# Patient Record
Sex: MALE | Race: BLACK OR AFRICAN AMERICAN | Employment: FULL TIME | URBAN - NONMETROPOLITAN AREA
[De-identification: names, ages, dates, MRNs, and addresses within clinical notes are randomized per-mention and may not be internally consistent; named-entity substitution may affect disease eponyms.]

---

## 2017-06-13 ENCOUNTER — OFFICE VISIT (OUTPATIENT)
Dept: UROLOGY | Age: 57
End: 2017-06-13

## 2017-06-13 VITALS
BODY MASS INDEX: 35.16 KG/M2 | DIASTOLIC BLOOD PRESSURE: 82 MMHG | SYSTOLIC BLOOD PRESSURE: 132 MMHG | HEIGHT: 74 IN | WEIGHT: 274 LBS

## 2017-06-13 DIAGNOSIS — R97.20 ELEVATED PSA: Primary | ICD-10-CM

## 2017-06-13 LAB
BILIRUBIN URINE: NEGATIVE
BLOOD URINE, POC: NORMAL
CHARACTER, URINE: CLEAR
COLOR, URINE: YELLOW
GLUCOSE URINE: NEGATIVE MG/DL
KETONES, URINE: NEGATIVE
LEUKOCYTE CLUMPS, URINE: NEGATIVE
NITRITE, URINE: NEGATIVE
PH, URINE: 5.5
PROTEIN, URINE: NEGATIVE MG/DL
SPECIFIC GRAVITY, URINE: 1.02 (ref 1–1.03)
UROBILINOGEN, URINE: 0.2 EU/DL

## 2017-06-13 PROCEDURE — 99213 OFFICE O/P EST LOW 20 MIN: CPT | Performed by: NURSE PRACTITIONER

## 2017-06-13 PROCEDURE — 81003 URINALYSIS AUTO W/O SCOPE: CPT | Performed by: NURSE PRACTITIONER

## 2017-06-13 ASSESSMENT — ENCOUNTER SYMPTOMS
VOMITING: 0
ABDOMINAL PAIN: 0
NAUSEA: 0

## 2017-06-19 RX ORDER — AMLODIPINE BESYLATE 5 MG/1
5 TABLET ORAL DAILY
Qty: 90 TABLET | Refills: 0 | Status: SHIPPED | OUTPATIENT
Start: 2017-06-19 | End: 2017-09-17 | Stop reason: SDUPTHER

## 2017-07-24 DIAGNOSIS — E78.5 HYPERLIPIDEMIA WITH TARGET LDL LESS THAN 100: Primary | ICD-10-CM

## 2017-07-24 DIAGNOSIS — R73.01 IFG (IMPAIRED FASTING GLUCOSE): ICD-10-CM

## 2017-07-26 ENCOUNTER — HOSPITAL ENCOUNTER (OUTPATIENT)
Age: 57
Discharge: HOME | End: 2017-07-26
Payer: COMMERCIAL

## 2017-07-26 DIAGNOSIS — E78.5 HYPERLIPIDEMIA WITH TARGET LDL LESS THAN 100: ICD-10-CM

## 2017-07-26 DIAGNOSIS — R73.01 IFG (IMPAIRED FASTING GLUCOSE): ICD-10-CM

## 2017-07-26 LAB
ALBUMIN SERPL-MCNC: 4.1 G/DL (ref 3.5–5.1)
ALP BLD-CCNC: 73 U/L (ref 38–126)
ALT SERPL-CCNC: 25 U/L (ref 11–66)
ANION GAP SERPL CALCULATED.3IONS-SCNC: 14 MEQ/L (ref 8–16)
AST SERPL-CCNC: 27 U/L (ref 5–40)
AVERAGE GLUCOSE: 114 MG/DL (ref 70–126)
BILIRUB SERPL-MCNC: 0.7 MG/DL (ref 0.3–1.2)
BUN BLDV-MCNC: 14 MG/DL (ref 7–22)
CALCIUM SERPL-MCNC: 9 MG/DL (ref 8.5–10.5)
CHLORIDE BLD-SCNC: 103 MEQ/L (ref 98–111)
CHOLESTEROL, TOTAL: 130 MG/DL (ref 100–199)
CO2: 23 MEQ/L (ref 23–33)
CREAT SERPL-MCNC: 1 MG/DL (ref 0.4–1.2)
GLUCOSE BLD-MCNC: 95 MG/DL (ref 70–108)
HBA1C MFR BLD: 5.8 % (ref 4.4–6.4)
HDLC SERPL-MCNC: 63 MG/DL
LDL CHOLESTEROL CALCULATED: 55 MG/DL
POTASSIUM SERPL-SCNC: 4 MEQ/L (ref 3.5–5.2)
SODIUM BLD-SCNC: 140 MEQ/L (ref 135–145)
T4 FREE: 1.22 NG/DL (ref 0.93–1.76)
TOTAL PROTEIN: 6.7 G/DL (ref 6.1–8)
TRIGL SERPL-MCNC: 59 MG/DL (ref 0–199)
TSH SERPL DL<=0.05 MIU/L-ACNC: 0.28 UIU/ML (ref 0.4–4.2)

## 2017-07-26 PROCEDURE — 83036 HEMOGLOBIN GLYCOSYLATED A1C: CPT

## 2017-07-26 PROCEDURE — 84439 ASSAY OF FREE THYROXINE: CPT

## 2017-07-26 PROCEDURE — 80061 LIPID PANEL: CPT

## 2017-07-26 PROCEDURE — 84443 ASSAY THYROID STIM HORMONE: CPT

## 2017-07-26 PROCEDURE — 80053 COMPREHEN METABOLIC PANEL: CPT

## 2017-07-26 PROCEDURE — 36415 COLL VENOUS BLD VENIPUNCTURE: CPT

## 2017-08-03 ENCOUNTER — OFFICE VISIT (OUTPATIENT)
Dept: FAMILY MEDICINE CLINIC | Age: 57
End: 2017-08-03
Payer: COMMERCIAL

## 2017-08-03 VITALS
RESPIRATION RATE: 14 BRPM | HEART RATE: 76 BPM | HEIGHT: 74 IN | OXYGEN SATURATION: 98 % | SYSTOLIC BLOOD PRESSURE: 112 MMHG | TEMPERATURE: 97.7 F | DIASTOLIC BLOOD PRESSURE: 78 MMHG | WEIGHT: 258 LBS | BODY MASS INDEX: 33.11 KG/M2

## 2017-08-03 DIAGNOSIS — N41.1 CHRONIC PROSTATITIS: ICD-10-CM

## 2017-08-03 DIAGNOSIS — R97.20 ELEVATED PSA: ICD-10-CM

## 2017-08-03 DIAGNOSIS — Z00.00 WELL ADULT HEALTH CHECK: Primary | ICD-10-CM

## 2017-08-03 DIAGNOSIS — E78.5 HYPERLIPIDEMIA WITH TARGET LDL LESS THAN 100: ICD-10-CM

## 2017-08-03 DIAGNOSIS — I10 ESSENTIAL HYPERTENSION: ICD-10-CM

## 2017-08-03 DIAGNOSIS — R73.01 IFG (IMPAIRED FASTING GLUCOSE): ICD-10-CM

## 2017-08-03 DIAGNOSIS — Z12.11 SCREEN FOR COLON CANCER: ICD-10-CM

## 2017-08-03 LAB — GFR SERPL CREATININE-BSD FRML MDRD: 77 ML/MIN/1.73M2

## 2017-08-03 PROCEDURE — 99396 PREV VISIT EST AGE 40-64: CPT | Performed by: FAMILY MEDICINE

## 2017-08-03 ASSESSMENT — PATIENT HEALTH QUESTIONNAIRE - PHQ9
1. LITTLE INTEREST OR PLEASURE IN DOING THINGS: 0
2. FEELING DOWN, DEPRESSED OR HOPELESS: 0
SUM OF ALL RESPONSES TO PHQ QUESTIONS 1-9: 0
SUM OF ALL RESPONSES TO PHQ9 QUESTIONS 1 & 2: 0

## 2017-08-03 ASSESSMENT — ENCOUNTER SYMPTOMS
GASTROINTESTINAL NEGATIVE: 1
RESPIRATORY NEGATIVE: 1

## 2017-08-21 RX ORDER — ATORVASTATIN CALCIUM 20 MG/1
TABLET, FILM COATED ORAL
Qty: 30 TABLET | Refills: 0 | Status: SHIPPED | OUTPATIENT
Start: 2017-08-21 | End: 2017-09-17 | Stop reason: SDUPTHER

## 2017-09-18 RX ORDER — AMLODIPINE BESYLATE 5 MG/1
TABLET ORAL
Qty: 90 TABLET | Refills: 0 | Status: SHIPPED | OUTPATIENT
Start: 2017-09-18 | End: 2017-12-18 | Stop reason: SDUPTHER

## 2017-09-18 RX ORDER — ATORVASTATIN CALCIUM 20 MG/1
TABLET, FILM COATED ORAL
Qty: 30 TABLET | Refills: 0 | Status: SHIPPED | OUTPATIENT
Start: 2017-09-18 | End: 2017-10-21 | Stop reason: SDUPTHER

## 2017-10-23 RX ORDER — ATORVASTATIN CALCIUM 20 MG/1
TABLET, FILM COATED ORAL
Qty: 30 TABLET | Refills: 11 | Status: SHIPPED | OUTPATIENT
Start: 2017-10-23 | End: 2018-10-25 | Stop reason: SDUPTHER

## 2017-12-18 RX ORDER — AMLODIPINE BESYLATE 5 MG/1
TABLET ORAL
Qty: 90 TABLET | Refills: 0 | Status: SHIPPED | OUTPATIENT
Start: 2017-12-18 | End: 2018-03-20 | Stop reason: SDUPTHER

## 2018-01-18 ENCOUNTER — HOSPITAL ENCOUNTER (EMERGENCY)
Age: 58
Discharge: HOME | End: 2018-01-18
Attending: INTERNAL MEDICINE | Admitting: INTERNAL MEDICINE
Payer: COMMERCIAL

## 2018-01-18 VITALS
RESPIRATION RATE: 20 BRPM | TEMPERATURE: 98.2 F | DIASTOLIC BLOOD PRESSURE: 62 MMHG | HEART RATE: 88 BPM | OXYGEN SATURATION: 98 % | SYSTOLIC BLOOD PRESSURE: 117 MMHG

## 2018-01-18 DIAGNOSIS — T18.128A FOOD IMPACTION OF ESOPHAGUS, INITIAL ENCOUNTER: Primary | ICD-10-CM

## 2018-01-18 PROCEDURE — 6370000000 HC RX 637 (ALT 250 FOR IP): Performed by: INTERNAL MEDICINE

## 2018-01-18 PROCEDURE — 6360000002 HC RX W HCPCS: Performed by: INTERNAL MEDICINE

## 2018-01-18 PROCEDURE — 99153 MOD SED SAME PHYS/QHP EA: CPT | Performed by: INTERNAL MEDICINE

## 2018-01-18 PROCEDURE — 99283 EMERGENCY DEPT VISIT LOW MDM: CPT

## 2018-01-18 PROCEDURE — 43215 ESOPHAGOSCOPY FLEX REMOVE FB: CPT

## 2018-01-18 PROCEDURE — 99152 MOD SED SAME PHYS/QHP 5/>YRS: CPT | Performed by: INTERNAL MEDICINE

## 2018-01-18 PROCEDURE — 3609012900 HC EGD FOREIGN BODY REMOVAL: Performed by: INTERNAL MEDICINE

## 2018-01-18 RX ORDER — MIDAZOLAM HYDROCHLORIDE 1 MG/ML
INJECTION INTRAMUSCULAR; INTRAVENOUS PRN
Status: DISCONTINUED | OUTPATIENT
Start: 2018-01-18 | End: 2018-01-18 | Stop reason: HOSPADM

## 2018-01-18 RX ORDER — FENTANYL CITRATE 50 UG/ML
INJECTION, SOLUTION INTRAMUSCULAR; INTRAVENOUS PRN
Status: DISCONTINUED | OUTPATIENT
Start: 2018-01-18 | End: 2018-01-18 | Stop reason: HOSPADM

## 2018-01-18 ASSESSMENT — ENCOUNTER SYMPTOMS
BACK PAIN: 0
ABDOMINAL PAIN: 1
NAUSEA: 0
SORE THROAT: 0
SHORTNESS OF BREATH: 0
VOMITING: 0
COUGH: 0
EYE REDNESS: 0
DIARRHEA: 0
WHEEZING: 0
RHINORRHEA: 0
EYE DISCHARGE: 0

## 2018-01-18 ASSESSMENT — PAIN SCALES - GENERAL: PAINLEVEL_OUTOF10: 0

## 2018-01-18 NOTE — ED NOTES
Bed: 015A  Expected date:   Expected time:   Means of arrival:   Comments:  Stef Allison RN  01/18/18 [id number]

## 2018-01-18 NOTE — ED PROVIDER NOTES
[facility] Emergency Department    CHIEF COMPLAINT       Chief Complaint   Patient presents with    Foreign Body     in throat       Nurses Notes reviewed and I agree except as noted in the HPI. HISTORY OF PRESENT ILLNESS    Sylvain Benitez is a 59 y.o. male who presents to the ED for evaluation of foreign body stuck in his throat. The patient states that he has steak stuck in the epigastric area since 1200. He reports that he tried warm water and it came back up. He also tried to vomit the steak back out but had no success. He says that he doesn't have pain but it \"feels full and uncomfortable\". The patient reports that he has this issue about 1-2 times a year. He reports that his gastroenterologist is Dr. Gisselle Delgado. He has no other physical complaints at this time. Pain description:  Onset: eating steak and got stuck in the epigastric area  Location: epigastric  Duration: continous  Character: \"uncomfortable and feels full\"  Aggravating factors: none  Radiation: none  Timing: today  Severity: moderate    Experienced previously: 1-2 times a year    HPI was provided by the patient. REVIEW OF SYSTEMS     Review of Systems   Constitutional: Negative for appetite change, chills, fatigue and fever. HENT: Negative for congestion, ear pain, rhinorrhea and sore throat. Eyes: Negative for discharge, redness and visual disturbance. Respiratory: Negative for cough, shortness of breath and wheezing. Cardiovascular: Negative for chest pain, palpitations and leg swelling. Gastrointestinal: Positive for abdominal pain (epigastric). Negative for diarrhea, nausea and vomiting. Genitourinary: Negative for decreased urine volume, difficulty urinating and dysuria. Musculoskeletal: Negative for arthralgias, back pain, joint swelling and neck pain. Skin: Negative for pallor and rash. Allergic/Immunologic: Negative for environmental allergies.    Neurological: Negative for dizziness, syncope, weakness, Labs and imaging were not seen necessary. Dr. Bishop Posada his gastroenterologist was consulted and discussed the patient's current condition and history. He kindly agreed to take the patient to endoscopy for definitive care. Food was pushed into stomach, Duane Lunch will follow up on outpatient basis. Patient was reassessed post procedure and symptoms improve. He ambulated with no difficulty. Son was here to take patient home. The results and plan for discharge have been discussed and the patient is amenable. The patient is stable for discharge. Medications - No data to display    Patient was seen independently by myself. The patient's final impression and disposition and plan was determined by myself. CRITICAL CARE:   None    CONSULTS:  Dr. Bishop Posada, gastroenterology - consulted and discussed the patient's current condition and history. PROCEDURES:  None    FINAL IMPRESSION      1. Food impaction of esophagus, initial encounter          DISPOSITION/PLAN   discharge    PATIENT REFERRED TO:  Bishop Swetha MD  [address] [facility]. [address]  [phone]    Call   For follow up and evaluation      DISCHARGE MEDICATIONS:  Current Discharge Medication List          (Please note that portions of this note were completed with a voice recognition program.  Efforts were made to edit the dictations but occasionally words are mis-transcribed.)    Scribe:  Yossi Oakes 1/18/18 3:31 PM Scribing for and in the presence of Bandar De La Torre CNP. Signed by: Rosetta Edwards, 01/18/18 5:35 PM    Provider:  I personally performed the services described in the documentation, reviewed and edited the documentation which was dictated to the scribe in my presence, and it accurately records my words and actions.     Bandar De La Torre CNP 01/18/18 5:35 PM    [address] NP  01/18/18 [id number]

## 2018-01-18 NOTE — PRE SEDATION
[facility]  Sedation/Analgesia History & Physical    Patient: Thi Call: 1960  Med Rec#: [id number] Acc#: [id number]   Provider Performing Procedure: Anthony Manley  Primary Care Physician: Deon Ray DO    PRE-PROCEDURE   Full CODE [x]Yes  DNR-CCA/DNR-CC []Yes   Brief History/Pre-Procedure Diagnosis:Esophageal food impaction           MEDICAL HISTORY  []CAD/Valve  []Liver Disease  []Lung Disease []Diabetes  []Hypertension []Renal Disease  []Additional information:       has a past medical history of Elevated PSA; Hyperlipidemia LDL goal < 100; and Hypertension. 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 Additional information:       ALLERGIES   Allergies as of 01/18/2018 - Review Complete 01/18/2018   Allergen Reaction Noted    Lisinopril Swelling 08/28/2017    Shellfish-derived products  07/08/2013     Additional information:       MEDICATIONS   Coumadin Use Last 7 Days [x]No []Yes  Antiplatelet drug therapy use last 7 days  [x]No []Yes  Other anticoagulant use last 7 days  [x]No []Yes    Current Facility-Administered Medications:     benzocaine (HURRICAINE) 20 % oral spray, , , PRN, Anthony Manley MD, 1 each at 01/18/18 1625    fentaNYL (SUBLIMAZE) injection, , , PRN, Anthony Manley MD, 25 mcg at 01/18/18 1628    midazolam (VERSED) injection, , , PRN, Anthony Manley MD, 1 mg at 01/18/18 1627  Prior to Admission medications    Medication Sig Start Date End Date Taking?  Authorizing Provider   amLODIPine (NORVASC) 5 MG tablet TAKE 1 TABLET BY MOUTH ONE TIME A DAY  12/18/17   Deon Ray DO   atorvastatin (LIPITOR) 20 MG tablet TAKE 1 TABLET BY MOUTH ONE TIME A DAY  10/23/17   Deon Ray DO   SUPREP BOWEL PREP KIT 17.5-3.13-1.6 GM/180ML SOLN Take 1 kit by mouth See Admin Instructions Use as directed 8/28/17   Anthony Manley MD

## 2018-01-19 NOTE — CONSULTS
[address] [place] [address]                                   CONSULTATION    PATIENT NAME: Alyson Saenz                    :        1960  MED REC NO:   [id number]                           ROOM:       015  ACCOUNT NO:   [de-identified]                           ADMIT DATE: 2018  PROVIDER:     Chriss Hammans, M.D. Steffi Roys DATE:  2018    HISTORY OF PRESENT ILLNESS:  The patient is known to the practice, present  to the ER with food impaction of the esophagus. It was in the afternoon,  he is not able to swallow , even saliva. He has history of gastric reflux. Had dysphagia for years. He history of difficulty swallowing in the past, but  not severe, happening every few months. At this time, he is not able to  swallow. He was eating steak. He has no regurgitation in the past. No  Has odynophagia _. No vomit of undigested food. He lost weight, but at this  time, his bowel movement has not changed. He has no blood in the stool. No mucus discharge in the stool. He is has no diarrhea at this time. PAST MEDICAL HISTORY:  Significant for hypertension, hyperlipidemia,  elevated PSA, Achilles tendon surgery. PAST SURGICAL HISTORY:  Cornea transplant. FAMILY HISTORY:  Hypertension and colon cancer. SOCIAL HISTORY:  No smoking. No alcohol abuse. CURRENT MEDICATIONS:  He is on Lipitor, Norvasc, prednisone, multivitamin,  and omeprazole. ALLERGIES:  LISINOPRIL and SHELLFISH DERIVED PRODUCTS. PHYSICAL EXAMINATION:  GENERAL:  Pleasant, alert, and comfortable male. Not using accessory  muscle. VITAL SIGNS:  He is afebrile. His blood pressure 144/100. Respirations 18. HEENT:  Head is atraumatic. Sclerae anicteric. Oral cavity normal.  CHEST:  Normal.  CARDIOVASCULAR:  Normal.  ABDOMEN:  Soft and nontender. There is no rebound or guarding. No  hepatomegaly. No splenomegaly.   EXTREMITIES:  No

## 2018-01-19 NOTE — OP NOTE
retroflexion or examination of the cardia  due to the fact that there is food particles seen in the stomach to prevent  examination. No biopsy obtained. Scope withdrawn with no immediate  complication. IMPRESSION:  1. Food impaction in the distal esophagus, removed by pushing to the  gastric lumen. 2.  Tortous esophagus and features suggest acid reflux as well as  eosinophilic esophagitis. PLAN:  1. Continue PPI. 2.  Soft diet. 3.  We will schedule the patient for upper endoscopy elective with dilation  to prevent food impaction of the esophagus. 4.  Watch for GI bleed after the endoscopy.         Bill Wilcox M.D.    D: 01/18/2018 17:33:00       T: 01/19/2018 3:06:51     [id number]  Job#: [id number]     Doc#: [id number]    CC:  Thang Segura D.O.

## 2018-03-21 RX ORDER — AMLODIPINE BESYLATE 5 MG/1
TABLET ORAL
Qty: 90 TABLET | Refills: 0 | Status: SHIPPED | OUTPATIENT
Start: 2018-03-21 | End: 2018-06-21 | Stop reason: SDUPTHER

## 2018-06-07 ENCOUNTER — HOSPITAL ENCOUNTER (OUTPATIENT)
Age: 58
Discharge: HOME | End: 2018-06-07
Payer: COMMERCIAL

## 2018-06-07 DIAGNOSIS — R97.20 ELEVATED PSA: ICD-10-CM

## 2018-06-07 LAB — PROSTATE SPECIFIC ANTIGEN: 6.44 NG/ML (ref 0–1)

## 2018-06-07 PROCEDURE — 84153 ASSAY OF PSA TOTAL: CPT

## 2018-06-07 PROCEDURE — 36415 COLL VENOUS BLD VENIPUNCTURE: CPT

## 2018-06-13 ENCOUNTER — OFFICE VISIT (OUTPATIENT)
Dept: UROLOGY | Age: 58
End: 2018-06-13
Payer: COMMERCIAL

## 2018-06-13 VITALS
BODY MASS INDEX: 29.76 KG/M2 | SYSTOLIC BLOOD PRESSURE: 136 MMHG | DIASTOLIC BLOOD PRESSURE: 88 MMHG | HEIGHT: 74 IN | WEIGHT: 231.9 LBS

## 2018-06-13 DIAGNOSIS — R97.20 ELEVATED PSA: Primary | ICD-10-CM

## 2018-06-13 LAB
BILIRUBIN URINE: NEGATIVE
BLOOD URINE, POC: ABNORMAL
CHARACTER, URINE: CLEAR
COLOR, URINE: YELLOW
GLUCOSE URINE: NEGATIVE MG/DL
KETONES, URINE: NEGATIVE
LEUKOCYTE CLUMPS, URINE: NEGATIVE
NITRITE, URINE: NEGATIVE
PH, URINE: 6
PROTEIN, URINE: NEGATIVE MG/DL
SPECIFIC GRAVITY, URINE: 1.02 (ref 1–1.03)
UROBILINOGEN, URINE: 0.2 EU/DL

## 2018-06-13 PROCEDURE — 99213 OFFICE O/P EST LOW 20 MIN: CPT | Performed by: NURSE PRACTITIONER

## 2018-06-13 PROCEDURE — 1036F TOBACCO NON-USER: CPT | Performed by: NURSE PRACTITIONER

## 2018-06-13 PROCEDURE — 3017F COLORECTAL CA SCREEN DOC REV: CPT | Performed by: NURSE PRACTITIONER

## 2018-06-13 PROCEDURE — G8417 CALC BMI ABV UP PARAM F/U: HCPCS | Performed by: NURSE PRACTITIONER

## 2018-06-13 PROCEDURE — 81003 URINALYSIS AUTO W/O SCOPE: CPT | Performed by: NURSE PRACTITIONER

## 2018-06-13 PROCEDURE — G8427 DOCREV CUR MEDS BY ELIG CLIN: HCPCS | Performed by: NURSE PRACTITIONER

## 2018-06-14 ASSESSMENT — ENCOUNTER SYMPTOMS
VOMITING: 0
ABDOMINAL PAIN: 0
NAUSEA: 0

## 2018-06-21 RX ORDER — AMLODIPINE BESYLATE 5 MG/1
TABLET ORAL
Qty: 90 TABLET | Refills: 0 | Status: SHIPPED | OUTPATIENT
Start: 2018-06-21 | End: 2018-09-21 | Stop reason: SDUPTHER

## 2018-07-15 ENCOUNTER — PATIENT MESSAGE (OUTPATIENT)
Dept: FAMILY MEDICINE CLINIC | Age: 58
End: 2018-07-15

## 2018-07-15 DIAGNOSIS — Z00.00 WELL ADULT HEALTH CHECK: Primary | ICD-10-CM

## 2018-07-30 ENCOUNTER — HOSPITAL ENCOUNTER (OUTPATIENT)
Age: 58
Discharge: HOME | End: 2018-07-30
Payer: COMMERCIAL

## 2018-07-30 DIAGNOSIS — Z00.00 WELL ADULT HEALTH CHECK: ICD-10-CM

## 2018-07-30 LAB
ALBUMIN SERPL-MCNC: 4.1 G/DL (ref 3.5–5.1)
ALP BLD-CCNC: 82 U/L (ref 38–126)
ALT SERPL-CCNC: 27 U/L (ref 11–66)
ANION GAP SERPL CALCULATED.3IONS-SCNC: 15 MEQ/L (ref 8–16)
AST SERPL-CCNC: 26 U/L (ref 5–40)
AVERAGE GLUCOSE: 111 MG/DL (ref 70–126)
BASOPHILS # BLD: 0.6 %
BASOPHILS ABSOLUTE: 0 THOU/MM3 (ref 0–0.1)
BILIRUB SERPL-MCNC: 0.5 MG/DL (ref 0.3–1.2)
BUN BLDV-MCNC: 18 MG/DL (ref 7–22)
CALCIUM SERPL-MCNC: 9.5 MG/DL (ref 8.5–10.5)
CHLORIDE BLD-SCNC: 107 MEQ/L (ref 98–111)
CHOLESTEROL, TOTAL: 138 MG/DL (ref 100–199)
CO2: 22 MEQ/L (ref 23–33)
CREAT SERPL-MCNC: 1.2 MG/DL (ref 0.4–1.2)
EOSINOPHIL # BLD: 3.6 %
EOSINOPHILS ABSOLUTE: 0.2 THOU/MM3 (ref 0–0.4)
ERYTHROCYTE [DISTWIDTH] IN BLOOD BY AUTOMATED COUNT: 13.8 % (ref 11.5–14.5)
ERYTHROCYTE [DISTWIDTH] IN BLOOD BY AUTOMATED COUNT: 47.8 FL (ref 35–45)
GFR SERPL CREATININE-BSD FRML MDRD: 75 ML/MIN/1.73M2
GLUCOSE BLD-MCNC: 98 MG/DL (ref 70–108)
HBA1C MFR BLD: 5.7 % (ref 4.4–6.4)
HCT VFR BLD CALC: 46.4 % (ref 42–52)
HDLC SERPL-MCNC: 60 MG/DL
HEMOGLOBIN: 15.2 GM/DL (ref 14–18)
IMMATURE GRANS (ABS): 0.01 THOU/MM3 (ref 0–0.07)
IMMATURE GRANULOCYTES: 0.2 %
LDL CHOLESTEROL CALCULATED: 61 MG/DL
LYMPHOCYTES # BLD: 26 %
LYMPHOCYTES ABSOLUTE: 1.7 THOU/MM3 (ref 1–4.8)
MCH RBC QN AUTO: 31 PG (ref 26–33)
MCHC RBC AUTO-ENTMCNC: 32.8 GM/DL (ref 32.2–35.5)
MCV RBC AUTO: 94.7 FL (ref 80–94)
MONOCYTES # BLD: 7.9 %
MONOCYTES ABSOLUTE: 0.5 THOU/MM3 (ref 0.4–1.3)
NUCLEATED RED BLOOD CELLS: 0 /100 WBC
PLATELET # BLD: 194 THOU/MM3 (ref 130–400)
PMV BLD AUTO: 9.5 FL (ref 9.4–12.4)
POTASSIUM SERPL-SCNC: 4.1 MEQ/L (ref 3.5–5.2)
RBC # BLD: 4.9 MILL/MM3 (ref 4.7–6.1)
SEG NEUTROPHILS: 61.7 %
SEGMENTED NEUTROPHILS ABSOLUTE COUNT: 3.9 THOU/MM3 (ref 1.8–7.7)
SODIUM BLD-SCNC: 144 MEQ/L (ref 135–145)
TOTAL PROTEIN: 7.3 G/DL (ref 6.1–8)
TRIGL SERPL-MCNC: 83 MG/DL (ref 0–199)
TSH SERPL DL<=0.05 MIU/L-ACNC: 0.7 UIU/ML (ref 0.4–4.2)
WBC # BLD: 6.4 THOU/MM3 (ref 4.8–10.8)

## 2018-07-30 PROCEDURE — 84443 ASSAY THYROID STIM HORMONE: CPT

## 2018-07-30 PROCEDURE — 80053 COMPREHEN METABOLIC PANEL: CPT

## 2018-07-30 PROCEDURE — 83036 HEMOGLOBIN GLYCOSYLATED A1C: CPT

## 2018-07-30 PROCEDURE — 85025 COMPLETE CBC W/AUTO DIFF WBC: CPT

## 2018-07-30 PROCEDURE — 80061 LIPID PANEL: CPT

## 2018-07-30 PROCEDURE — 36415 COLL VENOUS BLD VENIPUNCTURE: CPT

## 2018-08-06 ENCOUNTER — OFFICE VISIT (OUTPATIENT)
Dept: FAMILY MEDICINE CLINIC | Age: 58
End: 2018-08-06
Payer: COMMERCIAL

## 2018-08-06 VITALS
BODY MASS INDEX: 33.84 KG/M2 | RESPIRATION RATE: 12 BRPM | DIASTOLIC BLOOD PRESSURE: 72 MMHG | SYSTOLIC BLOOD PRESSURE: 130 MMHG | HEART RATE: 76 BPM | HEIGHT: 74 IN | WEIGHT: 263.7 LBS

## 2018-08-06 DIAGNOSIS — I10 ESSENTIAL HYPERTENSION: Primary | ICD-10-CM

## 2018-08-06 DIAGNOSIS — R97.20 ELEVATED PSA: ICD-10-CM

## 2018-08-06 DIAGNOSIS — N41.1 CHRONIC PROSTATITIS: ICD-10-CM

## 2018-08-06 DIAGNOSIS — E78.5 HYPERLIPIDEMIA WITH TARGET LDL LESS THAN 100: ICD-10-CM

## 2018-08-06 DIAGNOSIS — R73.01 IFG (IMPAIRED FASTING GLUCOSE): ICD-10-CM

## 2018-08-06 PROCEDURE — G8427 DOCREV CUR MEDS BY ELIG CLIN: HCPCS | Performed by: FAMILY MEDICINE

## 2018-08-06 PROCEDURE — 3017F COLORECTAL CA SCREEN DOC REV: CPT | Performed by: FAMILY MEDICINE

## 2018-08-06 PROCEDURE — 99214 OFFICE O/P EST MOD 30 MIN: CPT | Performed by: FAMILY MEDICINE

## 2018-08-06 PROCEDURE — G8417 CALC BMI ABV UP PARAM F/U: HCPCS | Performed by: FAMILY MEDICINE

## 2018-08-06 PROCEDURE — 1036F TOBACCO NON-USER: CPT | Performed by: FAMILY MEDICINE

## 2018-08-06 ASSESSMENT — ENCOUNTER SYMPTOMS
RESPIRATORY NEGATIVE: 1
GASTROINTESTINAL NEGATIVE: 1

## 2018-08-06 ASSESSMENT — PATIENT HEALTH QUESTIONNAIRE - PHQ9
1. LITTLE INTEREST OR PLEASURE IN DOING THINGS: 0
SUM OF ALL RESPONSES TO PHQ9 QUESTIONS 1 & 2: 0
2. FEELING DOWN, DEPRESSED OR HOPELESS: 0
SUM OF ALL RESPONSES TO PHQ QUESTIONS 1-9: 0

## 2018-08-06 NOTE — PATIENT INSTRUCTIONS
more often at first or until your blood pressure comes down. · If you are taking blood pressure medicine, talk to your doctor before you take decongestants or anti-inflammatory medicine, such as ibuprofen. Some of these medicines can raise blood pressure. · Learn how to check your blood pressure at home. Lifestyle changes  · Stay at a healthy weight. This is especially important if you put on weight around the waist. Losing even 10 pounds can help you lower your blood pressure. · If your doctor recommends it, get more exercise. Walking is a good choice. Bit by bit, increase the amount you walk every day. Try for at least 30 minutes on most days of the week. You also may want to swim, bike, or do other activities. · Avoid or limit alcohol. Talk to your doctor about whether you can drink any alcohol. · Try to limit how much sodium you eat to less than 2,300 milligrams (mg) a day. Your doctor may ask you to try to eat less than 1,500 mg a day. · Eat plenty of fruits (such as bananas and oranges), vegetables, legumes, whole grains, and low-fat dairy products. · Lower the amount of saturated fat in your diet. Saturated fat is found in animal products such as milk, cheese, and meat. Limiting these foods may help you lose weight and also lower your risk for heart disease. · Do not smoke. Smoking increases your risk for heart attack and stroke. If you need help quitting, talk to your doctor about stop-smoking programs and medicines. These can increase your chances of quitting for good. When should you call for help? Call 911 anytime you think you may need emergency care. This may mean having symptoms that suggest that your blood pressure is causing a serious heart or blood vessel problem. Your blood pressure may be over 180/110.   For example, call 911 if:    · You have symptoms of a heart attack. These may include:  ¨ Chest pain or pressure, or a strange feeling in the chest.  ¨ Sweating.   ¨ Shortness of breath. ¨ Nausea or vomiting. ¨ Pain, pressure, or a strange feeling in the back, neck, jaw, or upper belly or in one or both shoulders or arms. ¨ Lightheadedness or sudden weakness. ¨ A fast or irregular heartbeat.     · You have symptoms of a stroke. These may include:  ¨ Sudden numbness, tingling, weakness, or loss of movement in your face, arm, or leg, especially on only one side of your body. ¨ Sudden vision changes. ¨ Sudden trouble speaking. ¨ Sudden confusion or trouble understanding simple statements. ¨ Sudden problems with walking or balance. ¨ A sudden, severe headache that is different from past headaches.     · You have severe back or belly pain.    Do not wait until your blood pressure comes down on its own. Get help right away.   Call your doctor now or seek immediate care if:    · Your blood pressure is much higher than normal (such as 180/110 or higher), but you don't have symptoms.     · You think high blood pressure is causing symptoms, such as:  ¨ Severe headache. ¨ Blurry vision.    Watch closely for changes in your health, and be sure to contact your doctor if:    · Your blood pressure measures 140/90 or higher at least 2 times. That means the top number is 140 or higher or the bottom number is 90 or higher, or both.     · You think you may be having side effects from your blood pressure medicine.     · Your blood pressure is usually normal, but it goes above normal at least 2 times. Where can you learn more? Go to [email or web address] [email or web address] and sign in to your [employer] account. Enter R357 in the [employer] box to learn more about \"High Blood Pressure: Care Instructions. \"     If you do not have an account, please click on the \"Sign Up Now\" link. Current as of: December 6, 2017  Content Version: 11.6  © 2979-4585 [employer]. Care instructions adapted under license by [employer] [facility] ([facility]).  If you have questions about a medical condition or this

## 2018-08-06 NOTE — PROGRESS NOTES
Chronic Disease Visit Information    BP Readings from Last 3 Encounters:   08/06/18 130/72   06/13/18 136/88   01/18/18 117/62          Hemoglobin A1C (%)   Date Value   07/30/2018 5.7   07/26/2017 5.8   05/11/2016 5.8     LDL Calculated (mg/dL)   Date Value   07/30/2018 61     HDL (mg/dL)   Date Value   07/30/2018 60     BUN (mg/dL)   Date Value   07/30/2018 18     CREATININE (mg/dL)   Date Value   07/30/2018 1.2     Glucose (mg/dL)   Date Value   07/30/2018 98            Have you changed or started any medications since your last visit including any over-the-counter medicines, vitamins, or herbal medicines? no   Are you having any side effects from any of your medications? -  no  Have you stopped taking any of your medications? Is so, why? -  no    Have you seen any other physician or provider since your last visit? Yes - Records Obtained  Have you had any other diagnostic tests since your last visit? Yes - Records Obtained  Have you been seen in the emergency room and/or had an admission to a hospital since we last saw you? No  Have you had your annual diabetic retinal (eye) exam? No  Have you had your routine dental cleaning in the past 6 months? yes - 6/2018    Have you activated your [employer] account? If not, what are your barriers?  Yes     Patient Care Team:  Kathleen Doherty DO as PCP - General (Family Medicine)  Arjun Viramontes MD as Consulting Physician (Urology)         Medical History Review  Past Medical, Family, and Social History reviewed and does contribute to the patient presenting condition    Health Maintenance   Topic Date Due    Hepatitis C screen  1960    HIV screen  11/24/1973    Shingles Vaccine (1 of 2 - 2 Dose Series) 11/24/2008    Flu vaccine (1) 09/01/2018    A1C test (Diabetic or Prediabetic)  07/30/2019    Lipid screen  07/30/2023    DTaP/Tdap/Td vaccine (2 - Td) 04/09/2025    Colon cancer screen colonoscopy  09/29/2027
Date    HDL 60 07/30/2018    HDL 63 07/26/2017    HDL 67 05/11/2016     Lab Results   Component Value Date    LDLCALC 61 07/30/2018    LDLCALC 55 07/26/2017    LDLCALC 55 05/11/2016     No results found for: LABVLDL      Chemistry        Component Value Date/Time     07/30/2018 0837    K 4.1 07/30/2018 0837     07/30/2018 0837    CO2 22 (L) 07/30/2018 0837    BUN 18 07/30/2018 0837    CREATININE 1.2 07/30/2018 0837        Component Value Date/Time    CALCIUM 9.5 07/30/2018 0837    ALKPHOS 82 07/30/2018 0837    AST 26 07/30/2018 0837    ALT 27 07/30/2018 0837    BILITOT 0.5 07/30/2018 0837            Lab Results   Component Value Date    TSH 0.699 07/30/2018       Lab Results   Component Value Date    WBC 6.4 07/30/2018    HGB 15.2 07/30/2018    HCT 46.4 07/30/2018    MCV 94.7 (H) 07/30/2018     07/30/2018         Health Maintenance   Topic Date Due    Hepatitis C screen  1960    HIV screen  11/24/1973    Shingles Vaccine (1 of 2 - 2 Dose Series) 11/24/2008    Flu vaccine (1) 09/01/2018    A1C test (Diabetic or Prediabetic)  07/30/2019    Lipid screen  07/30/2023    DTaP/Tdap/Td vaccine (2 - Td) 04/09/2025    Colon cancer screen colonoscopy  09/29/2027       Immunization History   Administered Date(s) Administered    Influenza Vaccine, unspecified formulation 11/01/2016    Influenza Virus Vaccine 11/01/2014, 10/22/2015    Influenza, Triv, 3 years and older, IM 09/15/2017    Tdap (Boostrix, Adacel) 04/09/2015         Review of Systems   Constitutional: Negative. HENT: Negative. Respiratory: Negative. Cardiovascular: Negative. Gastrointestinal: Negative. Musculoskeletal: Negative. All other systems reviewed and are negative. Objective:   Physical Exam   Constitutional: He is oriented to person, place, and time. He appears well-developed and well-nourished. HENT:   Head: Normocephalic and atraumatic.    Right Ear: Tympanic membrane normal.   Left Ear: Tympanic

## 2018-09-21 RX ORDER — AMLODIPINE BESYLATE 5 MG/1
TABLET ORAL
Qty: 90 TABLET | Refills: 0 | Status: SHIPPED | OUTPATIENT
Start: 2018-09-21 | End: 2018-12-24 | Stop reason: SDUPTHER

## 2018-10-25 RX ORDER — ATORVASTATIN CALCIUM 20 MG/1
TABLET, FILM COATED ORAL
Qty: 90 TABLET | Refills: 10 | Status: SHIPPED | OUTPATIENT
Start: 2018-10-25 | End: 2019-11-01 | Stop reason: SDUPTHER

## 2018-12-10 ENCOUNTER — HOSPITAL ENCOUNTER (OUTPATIENT)
Age: 58
Discharge: HOME | End: 2018-12-10
Payer: COMMERCIAL

## 2018-12-10 DIAGNOSIS — R97.20 ELEVATED PSA: ICD-10-CM

## 2018-12-10 LAB — PROSTATE SPECIFIC ANTIGEN: 6.11 NG/ML (ref 0–1)

## 2018-12-10 PROCEDURE — 36415 COLL VENOUS BLD VENIPUNCTURE: CPT

## 2018-12-10 PROCEDURE — 84153 ASSAY OF PSA TOTAL: CPT

## 2018-12-11 ENCOUNTER — TELEPHONE (OUTPATIENT)
Dept: UROLOGY | Age: 58
End: 2018-12-11

## 2018-12-22 ENCOUNTER — HOSPITAL ENCOUNTER (EMERGENCY)
Age: 58
Discharge: HOME | End: 2018-12-22
Payer: COMMERCIAL

## 2018-12-22 VITALS
TEMPERATURE: 99 F | OXYGEN SATURATION: 96 % | DIASTOLIC BLOOD PRESSURE: 93 MMHG | HEART RATE: 92 BPM | RESPIRATION RATE: 16 BRPM | SYSTOLIC BLOOD PRESSURE: 140 MMHG

## 2018-12-22 DIAGNOSIS — J06.9 UPPER RESPIRATORY TRACT INFECTION, UNSPECIFIED TYPE: Primary | ICD-10-CM

## 2018-12-22 LAB
GROUP A STREP CULTURE, REFLEX: NEGATIVE
REFLEX THROAT C + S: NORMAL

## 2018-12-22 PROCEDURE — 99213 OFFICE O/P EST LOW 20 MIN: CPT | Performed by: NURSE PRACTITIONER

## 2018-12-22 PROCEDURE — 87070 CULTURE OTHR SPECIMN AEROBIC: CPT

## 2018-12-22 PROCEDURE — 99213 OFFICE O/P EST LOW 20 MIN: CPT

## 2018-12-22 RX ORDER — DOXYCYCLINE HYCLATE 100 MG
100 TABLET ORAL 2 TIMES DAILY
Qty: 14 TABLET | Refills: 0 | Status: SHIPPED | OUTPATIENT
Start: 2018-12-22 | End: 2018-12-29

## 2018-12-22 ASSESSMENT — PAIN DESCRIPTION - ORIENTATION: ORIENTATION: LEFT

## 2018-12-22 ASSESSMENT — ENCOUNTER SYMPTOMS
DIARRHEA: 0
SINUS PRESSURE: 0
CHEST TIGHTNESS: 0
RHINORRHEA: 0
SORE THROAT: 1
ABDOMINAL PAIN: 0
SHORTNESS OF BREATH: 0
NAUSEA: 0
VOMITING: 0
COUGH: 0

## 2018-12-22 ASSESSMENT — PAIN SCALES - GENERAL: PAINLEVEL_OUTOF10: 6

## 2018-12-22 ASSESSMENT — PAIN DESCRIPTION - PAIN TYPE: TYPE: ACUTE PAIN

## 2018-12-22 ASSESSMENT — PAIN DESCRIPTION - LOCATION: LOCATION: EAR;THROAT

## 2018-12-22 ASSESSMENT — PAIN DESCRIPTION - DESCRIPTORS: DESCRIPTORS: SORE

## 2018-12-22 ASSESSMENT — PAIN DESCRIPTION - FREQUENCY: FREQUENCY: CONTINUOUS

## 2018-12-22 NOTE — ED PROVIDER NOTES
Neurological: He is alert and oriented to person, place, and time. Skin: Skin is warm and dry. No rash (on exposed surfaces) noted. Psychiatric: He has a normal mood and affect. His speech is normal.   Nursing note and vitals reviewed. DIAGNOSTIC RESULTS   Labs:  Results for orders placed or performed during the hospital encounter of 12/22/18   Strep A culture, throat   Result Value Ref Range    REFLEX THROAT C + S INDICATED    STREP A ANTIGEN   Result Value Ref Range    GROUP A STREP CULTURE, REFLEX NEGATIVE        IMAGING:  No orders to display     URGENT CARE COURSE:     Vitals:    12/22/18 1523   BP: (!) 140/93   Pulse: 92   Resp: 16   Temp: 99 °F (37.2 °C)   TempSrc: Temporal   SpO2: 96%       Medications - No data to display  PROCEDURES:  None  FINALIMPRESSION      1. Upper respiratory tract infection, unspecified type        DISPOSITION/PLAN   DISPOSITION    Discharge   Strep negative  Physical assessment findings, diagnostic testing(s) if applicable, and vital signs reviewed with patient/patient representative. Questions answered. Medications as directed, including OTC medications for supportive care. Education provided on medications. Differential diagnosis(s) discussed with patient/patient representative. Home care/self care instructions reviewed with patient/patient representative. Patient is to follow-up with family care provider in 2-3 days if no improvement. Patient is to go to the emergency department if symptoms worsen. Patient/patient representative is aware of care plan, questions answered, verbalizes understanding and is in agreement. Teach back method used for patient/patient representative teaching(s) and printed instructions attached to after visit summary.     PATIENT REFERRED TO:  DO Earnest Collier, [address] [facility] [address]    Schedule an appointment as soon as possible for a visit in 3 days  for further evalation, If symptoms

## 2018-12-24 LAB — THROAT/NOSE CULTURE: NORMAL

## 2018-12-26 RX ORDER — AMLODIPINE BESYLATE 5 MG/1
TABLET ORAL
Qty: 90 TABLET | Refills: 0 | Status: SHIPPED | OUTPATIENT
Start: 2018-12-26 | End: 2019-03-27 | Stop reason: SDUPTHER

## 2019-03-08 ENCOUNTER — PATIENT MESSAGE (OUTPATIENT)
Dept: UROLOGY | Age: 59
End: 2019-03-08

## 2019-03-27 ENCOUNTER — PATIENT MESSAGE (OUTPATIENT)
Dept: FAMILY MEDICINE CLINIC | Age: 59
End: 2019-03-27

## 2019-03-27 RX ORDER — AMLODIPINE BESYLATE 5 MG/1
TABLET ORAL
Qty: 90 TABLET | Refills: 3 | Status: SHIPPED | OUTPATIENT
Start: 2019-03-27 | End: 2020-03-17 | Stop reason: SDUPTHER

## 2019-06-06 ENCOUNTER — NURSE ONLY (OUTPATIENT)
Dept: LAB | Age: 59
End: 2019-06-06

## 2019-06-06 DIAGNOSIS — R97.20 ELEVATED PSA: Primary | ICD-10-CM

## 2019-06-06 DIAGNOSIS — R97.20 ELEVATED PSA: ICD-10-CM

## 2019-06-06 LAB — PROSTATE SPECIFIC ANTIGEN: 7.23 NG/ML (ref 0–1)

## 2019-06-12 ENCOUNTER — OFFICE VISIT (OUTPATIENT)
Dept: UROLOGY | Age: 59
End: 2019-06-12
Payer: COMMERCIAL

## 2019-06-12 ENCOUNTER — TELEPHONE (OUTPATIENT)
Dept: UROLOGY | Age: 59
End: 2019-06-12

## 2019-06-12 VITALS
HEIGHT: 74 IN | BODY MASS INDEX: 34.78 KG/M2 | DIASTOLIC BLOOD PRESSURE: 86 MMHG | WEIGHT: 271 LBS | SYSTOLIC BLOOD PRESSURE: 126 MMHG

## 2019-06-12 DIAGNOSIS — R97.20 ELEVATED PSA: Primary | ICD-10-CM

## 2019-06-12 LAB
BILIRUBIN URINE: NEGATIVE
BLOOD URINE, POC: ABNORMAL
CHARACTER, URINE: CLEAR
COLOR, URINE: YELLOW
GLUCOSE URINE: NEGATIVE MG/DL
KETONES, URINE: NEGATIVE
LEUKOCYTE CLUMPS, URINE: ABNORMAL
NITRITE, URINE: NEGATIVE
PH, URINE: 6 (ref 5–9)
PROTEIN, URINE: NEGATIVE MG/DL
SPECIFIC GRAVITY, URINE: 1.02 (ref 1–1.03)
UROBILINOGEN, URINE: 0.2 EU/DL (ref 0–1)

## 2019-06-12 PROCEDURE — G8427 DOCREV CUR MEDS BY ELIG CLIN: HCPCS | Performed by: NURSE PRACTITIONER

## 2019-06-12 PROCEDURE — 1036F TOBACCO NON-USER: CPT | Performed by: NURSE PRACTITIONER

## 2019-06-12 PROCEDURE — 99214 OFFICE O/P EST MOD 30 MIN: CPT | Performed by: NURSE PRACTITIONER

## 2019-06-12 PROCEDURE — 81003 URINALYSIS AUTO W/O SCOPE: CPT | Performed by: NURSE PRACTITIONER

## 2019-06-12 PROCEDURE — 3017F COLORECTAL CA SCREEN DOC REV: CPT | Performed by: NURSE PRACTITIONER

## 2019-06-12 PROCEDURE — G8417 CALC BMI ABV UP PARAM F/U: HCPCS | Performed by: NURSE PRACTITIONER

## 2019-06-12 RX ORDER — CIPROFLOXACIN 500 MG/1
500 TABLET, FILM COATED ORAL 2 TIMES DAILY
Qty: 56 TABLET | Refills: 0 | Status: SHIPPED | OUTPATIENT
Start: 2019-06-12 | End: 2019-07-10

## 2019-06-12 RX ORDER — METHYLPREDNISOLONE 32 MG/1
32 TABLET ORAL DAILY
Qty: 2 TABLET | Refills: 0 | Status: SHIPPED | OUTPATIENT
Start: 2019-06-12 | End: 2019-06-14

## 2019-06-12 ASSESSMENT — ENCOUNTER SYMPTOMS
VOMITING: 0
ABDOMINAL PAIN: 0
NAUSEA: 0

## 2019-06-12 NOTE — TELEPHONE ENCOUNTER
MRI to be submitted for precertification with insurance prior to scheduling. Will contact the patient once approval has been obtained.

## 2019-06-12 NOTE — PROGRESS NOTES
Subjective:      Patient ID: Maru Ro is a 59 y.o. male. GAYATHRI Laguerre is here for follow-up of elevated PSA. Historically, he underwent a prostate biopsy in February 2014 for a PSA of 6.14 which was negative. Prostate size was 74 cc. He denies dysuria, hesitancy, weak stream, urgency, frequency, or gross hematuria. He does not take any Urological medications. There is no family history of prostate cancer. Review of Systems   Constitutional: Negative for chills, fatigue and fever. Gastrointestinal: Negative for abdominal pain, nausea and vomiting. Genitourinary: Negative for difficulty urinating, dysuria, flank pain, frequency, hematuria and urgency. Objective:   Physical Exam   Constitutional: He is oriented to person, place, and time. He appears well-developed and well-nourished. HENT:   Head: Normocephalic and atraumatic. Right Ear: External ear normal.   Left Ear: External ear normal.   Nose: Nose normal.   Eyes: Conjunctivae are normal.   Pulmonary/Chest: Effort normal.   Abdominal: Soft. Neurological: He is alert and oriented to person, place, and time. Skin: Skin is warm and dry. Psychiatric: He has a normal mood and affect.  His behavior is normal. Judgment and thought content normal.       Lab Results   Component Value Date    PSA 7.23 (H) 06/06/2019    PSA 6.11 (H) 12/10/2018    PSA 6.44 (H) 06/07/2018     Results for POC orders placed in visit on 06/12/19   POCT Urinalysis No Micro (Auto)   Result Value Ref Range    Glucose, Ur Negative NEGATIVE mg/dl    Bilirubin Urine Negative     Ketones, Urine Negative NEGATIVE    Specific Gravity, Urine 1.025 1.002 - 1.03    Blood, UA POC Small (A) NEGATIVE    pH, Urine 6.00 5.0 - 9.0    Protein, Urine Negative NEGATIVE mg/dl    Urobilinogen, Urine 0.20 0.0 - 1.0 eu/dl    Nitrite, Urine Negative NEGATIVE    Leukocyte Clumps, Urine Moderate (A) NEGATIVE    Color, Urine Yellow YELLOW-STR    Character, Urine Clear [id number]

## 2019-06-17 ENCOUNTER — TELEPHONE (OUTPATIENT)
Dept: UROLOGY | Age: 59
End: 2019-06-17

## 2019-06-17 DIAGNOSIS — R97.20 ELEVATED PSA: Primary | ICD-10-CM

## 2019-07-02 ENCOUNTER — PATIENT MESSAGE (OUTPATIENT)
Dept: FAMILY MEDICINE CLINIC | Age: 59
End: 2019-07-02

## 2019-07-02 DIAGNOSIS — R73.01 IFG (IMPAIRED FASTING GLUCOSE): Primary | ICD-10-CM

## 2019-07-02 DIAGNOSIS — E78.5 HYPERLIPIDEMIA WITH TARGET LDL LESS THAN 100: ICD-10-CM

## 2019-07-03 NOTE — TELEPHONE ENCOUNTER
From: Irasema Griffin  To: Payal Zelaya DO  Sent: 7/2/2019 5:21 PM EDT  Subject: Non-Urgent Medical Question    I will need a blood work order for my physical towards the end of the month . Should I come in and  the order when time gets close to the [place]Brooklyn Appointment?

## 2019-07-15 ENCOUNTER — HOSPITAL ENCOUNTER (OUTPATIENT)
Dept: MRI IMAGING | Age: 59
Discharge: HOME | End: 2019-07-15
Payer: COMMERCIAL

## 2019-07-15 DIAGNOSIS — R97.20 ELEVATED PSA: ICD-10-CM

## 2019-07-15 LAB — POC CREATININE WHOLE BLOOD: 1.3 MG/DL (ref 0.5–1.2)

## 2019-07-15 PROCEDURE — A9579 GAD-BASE MR CONTRAST NOS,1ML: HCPCS | Performed by: NURSE PRACTITIONER

## 2019-07-15 PROCEDURE — 6360000004 HC RX CONTRAST MEDICATION: Performed by: NURSE PRACTITIONER

## 2019-07-15 PROCEDURE — 76377 3D RENDER W/INTRP POSTPROCES: CPT

## 2019-07-15 PROCEDURE — 82565 ASSAY OF CREATININE: CPT

## 2019-07-15 RX ADMIN — GADOTERIDOL 20 ML: 279.3 INJECTION, SOLUTION INTRAVENOUS at 17:09

## 2019-07-16 ENCOUNTER — NURSE ONLY (OUTPATIENT)
Dept: LAB | Age: 59
End: 2019-07-16

## 2019-07-16 ENCOUNTER — TELEPHONE (OUTPATIENT)
Dept: UROLOGY | Age: 59
End: 2019-07-16

## 2019-07-16 DIAGNOSIS — E78.5 HYPERLIPIDEMIA WITH TARGET LDL LESS THAN 100: ICD-10-CM

## 2019-07-16 DIAGNOSIS — R97.20 ELEVATED PSA: ICD-10-CM

## 2019-07-16 DIAGNOSIS — R73.01 IFG (IMPAIRED FASTING GLUCOSE): ICD-10-CM

## 2019-07-16 LAB
ALBUMIN SERPL-MCNC: 4.3 G/DL (ref 3.5–5.1)
ALP BLD-CCNC: 89 U/L (ref 38–126)
ALT SERPL-CCNC: 21 U/L (ref 11–66)
ANION GAP SERPL CALCULATED.3IONS-SCNC: 12 MEQ/L (ref 8–16)
AST SERPL-CCNC: 22 U/L (ref 5–40)
AVERAGE GLUCOSE: 120 MG/DL (ref 70–126)
BILIRUB SERPL-MCNC: 0.5 MG/DL (ref 0.3–1.2)
BUN BLDV-MCNC: 16 MG/DL (ref 7–22)
CALCIUM SERPL-MCNC: 9.2 MG/DL (ref 8.5–10.5)
CHLORIDE BLD-SCNC: 105 MEQ/L (ref 98–111)
CHOLESTEROL, TOTAL: 162 MG/DL (ref 100–199)
CO2: 25 MEQ/L (ref 23–33)
CREAT SERPL-MCNC: 1.2 MG/DL (ref 0.4–1.2)
GLUCOSE BLD-MCNC: 115 MG/DL (ref 70–108)
HBA1C MFR BLD: 6 % (ref 4.4–6.4)
HDLC SERPL-MCNC: 85 MG/DL
LDL CHOLESTEROL CALCULATED: 61 MG/DL
POTASSIUM SERPL-SCNC: 4 MEQ/L (ref 3.5–5.2)
PROSTATE SPECIFIC ANTIGEN: 6.82 NG/ML (ref 0–1)
SODIUM BLD-SCNC: 142 MEQ/L (ref 135–145)
TOTAL PROTEIN: 6.9 G/DL (ref 6.1–8)
TRIGL SERPL-MCNC: 78 MG/DL (ref 0–199)
TSH SERPL DL<=0.05 MIU/L-ACNC: 0.48 UIU/ML (ref 0.4–4.2)

## 2019-07-16 NOTE — TELEPHONE ENCOUNTER
I attempted to call the patient regarding the message below. The mailbox was full and could not accept any message.

## 2019-07-23 ASSESSMENT — ENCOUNTER SYMPTOMS
NAUSEA: 0
ABDOMINAL PAIN: 0
VOMITING: 0

## 2019-07-23 NOTE — PROGRESS NOTES
13 (score 3). 6. There is a 3.1 cm focus of decreased T2-weighted signal within the mid prostate gland anteriorly, series 4 image 18 (score 3). 7. There is intermediate decreased T2 weighted signal within the peripheral zone at the mid prostate gland posteriorly on the left which most commonly is postinflammatory. There is no suspicious area of restricted diffusion within the peripheral zone    (score 2). 8. There are small iliac chain, pelvic and inguinal lymph nodes however there is no pathologically enlarged lymphadenopathy. 9. There is a nonspecific 1.2 cm focus of abnormal marrow signal within the posterior column of the right acetabulum which is low signal intensity on the T2-weighted images and increased signal intensity on the postcontrast T1 weighted images (series 3    image 13 and series 14 image 112). 10. PI-RADS assessment category 3.         Results for POC orders placed in visit on 07/24/19   POCT Urinalysis No Micro (Auto)   Result Value Ref Range    Glucose, Ur Negative NEGATIVE mg/dl    Bilirubin Urine Negative     Ketones, Urine Negative NEGATIVE    Specific Gravity, Urine 1.020 1.002 - 1.03    Blood, UA POC Trace-intact NEGATIVE    pH, Urine 7.00 5.0 - 9.0    Protein, Urine Negative NEGATIVE mg/dl    Urobilinogen, Urine 0.20 0.0 - 1.0 eu/dl    Nitrite, Urine Negative NEGATIVE    Leukocyte Clumps, Urine Small (A) NEGATIVE    Color, Urine Yellow YELLOW-STR    Character, Urine Clear [email or web address]       Assessment:      Elevated PSA  History of prostatitis  Enlarged Prostate      Plan:      MRI showed score 2 & 3 lesions with a very enlarged prostate. He is having some LUTs but does not wish to take Flomax or any other medication. PSA has decreased to 6.82. We will continue to monitor. PSA every 6 months. Follow-up in 1 year with additional PSA prior.

## 2019-07-24 ENCOUNTER — OFFICE VISIT (OUTPATIENT)
Dept: UROLOGY | Age: 59
End: 2019-07-24
Payer: COMMERCIAL

## 2019-07-24 VITALS
SYSTOLIC BLOOD PRESSURE: 128 MMHG | DIASTOLIC BLOOD PRESSURE: 72 MMHG | HEIGHT: 74 IN | BODY MASS INDEX: 35.33 KG/M2 | WEIGHT: 275.3 LBS

## 2019-07-24 DIAGNOSIS — R97.20 ELEVATED PSA: Primary | ICD-10-CM

## 2019-07-24 LAB
BILIRUBIN URINE: NEGATIVE
BLOOD URINE, POC: ABNORMAL
CHARACTER, URINE: CLEAR
COLOR, URINE: YELLOW
GLUCOSE URINE: NEGATIVE MG/DL
KETONES, URINE: NEGATIVE
LEUKOCYTE CLUMPS, URINE: ABNORMAL
NITRITE, URINE: NEGATIVE
PH, URINE: 7 (ref 5–9)
PROTEIN, URINE: NEGATIVE MG/DL
SPECIFIC GRAVITY, URINE: 1.02 (ref 1–1.03)
UROBILINOGEN, URINE: 0.2 EU/DL (ref 0–1)

## 2019-07-24 PROCEDURE — 3017F COLORECTAL CA SCREEN DOC REV: CPT | Performed by: NURSE PRACTITIONER

## 2019-07-24 PROCEDURE — G8427 DOCREV CUR MEDS BY ELIG CLIN: HCPCS | Performed by: NURSE PRACTITIONER

## 2019-07-24 PROCEDURE — 99213 OFFICE O/P EST LOW 20 MIN: CPT | Performed by: NURSE PRACTITIONER

## 2019-07-24 PROCEDURE — 81003 URINALYSIS AUTO W/O SCOPE: CPT | Performed by: NURSE PRACTITIONER

## 2019-07-24 PROCEDURE — G8417 CALC BMI ABV UP PARAM F/U: HCPCS | Performed by: NURSE PRACTITIONER

## 2019-07-24 PROCEDURE — 1036F TOBACCO NON-USER: CPT | Performed by: NURSE PRACTITIONER

## 2019-07-25 LAB — GFR SERPL CREATININE-BSD FRML MDRD: 75 ML/MIN/1.73M2

## 2019-07-31 ENCOUNTER — OFFICE VISIT (OUTPATIENT)
Dept: FAMILY MEDICINE CLINIC | Age: 59
End: 2019-07-31
Payer: COMMERCIAL

## 2019-07-31 VITALS
WEIGHT: 274.4 LBS | DIASTOLIC BLOOD PRESSURE: 76 MMHG | BODY MASS INDEX: 35.22 KG/M2 | RESPIRATION RATE: 16 BRPM | SYSTOLIC BLOOD PRESSURE: 124 MMHG | HEIGHT: 74 IN | HEART RATE: 80 BPM

## 2019-07-31 DIAGNOSIS — I10 ESSENTIAL HYPERTENSION: ICD-10-CM

## 2019-07-31 DIAGNOSIS — E78.5 HYPERLIPIDEMIA WITH TARGET LDL LESS THAN 100: ICD-10-CM

## 2019-07-31 DIAGNOSIS — N41.1 CHRONIC PROSTATITIS: ICD-10-CM

## 2019-07-31 DIAGNOSIS — R73.01 IFG (IMPAIRED FASTING GLUCOSE): Primary | ICD-10-CM

## 2019-07-31 DIAGNOSIS — R97.20 ELEVATED PSA: ICD-10-CM

## 2019-07-31 PROCEDURE — G8427 DOCREV CUR MEDS BY ELIG CLIN: HCPCS | Performed by: FAMILY MEDICINE

## 2019-07-31 PROCEDURE — 1036F TOBACCO NON-USER: CPT | Performed by: FAMILY MEDICINE

## 2019-07-31 PROCEDURE — G8417 CALC BMI ABV UP PARAM F/U: HCPCS | Performed by: FAMILY MEDICINE

## 2019-07-31 PROCEDURE — 3017F COLORECTAL CA SCREEN DOC REV: CPT | Performed by: FAMILY MEDICINE

## 2019-07-31 PROCEDURE — 99214 OFFICE O/P EST MOD 30 MIN: CPT | Performed by: FAMILY MEDICINE

## 2019-07-31 ASSESSMENT — PATIENT HEALTH QUESTIONNAIRE - PHQ9
SUM OF ALL RESPONSES TO PHQ QUESTIONS 1-9: 0
SUM OF ALL RESPONSES TO PHQ QUESTIONS 1-9: 0
2. FEELING DOWN, DEPRESSED OR HOPELESS: 0
SUM OF ALL RESPONSES TO PHQ9 QUESTIONS 1 & 2: 0
1. LITTLE INTEREST OR PLEASURE IN DOING THINGS: 0

## 2019-07-31 ASSESSMENT — ENCOUNTER SYMPTOMS
RESPIRATORY NEGATIVE: 1
GASTROINTESTINAL NEGATIVE: 1

## 2019-10-10 ENCOUNTER — NURSE ONLY (OUTPATIENT)
Dept: LAB | Age: 59
End: 2019-10-10

## 2019-10-10 DIAGNOSIS — R97.20 ELEVATED PSA: ICD-10-CM

## 2019-10-10 LAB — PROSTATE SPECIFIC ANTIGEN: 15.46 NG/ML (ref 0–1)

## 2019-10-11 ENCOUNTER — TELEPHONE (OUTPATIENT)
Dept: UROLOGY | Age: 59
End: 2019-10-11

## 2019-10-11 DIAGNOSIS — R97.20 ELEVATED PSA: Primary | ICD-10-CM

## 2019-10-11 RX ORDER — CIPROFLOXACIN 500 MG/1
500 TABLET, FILM COATED ORAL 2 TIMES DAILY
Qty: 56 TABLET | Refills: 0 | Status: SHIPPED | OUTPATIENT
Start: 2019-10-11 | End: 2019-11-08

## 2019-10-21 ENCOUNTER — HOSPITAL ENCOUNTER (OUTPATIENT)
Dept: INTERVENTIONAL RADIOLOGY/VASCULAR | Age: 59
Discharge: HOME | End: 2019-10-21

## 2019-10-21 DIAGNOSIS — Z13.6 ENCOUNTER FOR SCREENING FOR VASCULAR DISEASE: ICD-10-CM

## 2019-10-21 PROCEDURE — 9900000021 US VASCULAR SCREENING

## 2019-11-01 RX ORDER — ATORVASTATIN CALCIUM 20 MG/1
TABLET, FILM COATED ORAL
Qty: 90 TABLET | Refills: 3 | Status: SHIPPED | OUTPATIENT
Start: 2019-11-01 | End: 2020-11-23 | Stop reason: SDUPTHER

## 2019-11-18 ENCOUNTER — NURSE ONLY (OUTPATIENT)
Dept: LAB | Age: 59
End: 2019-11-18

## 2019-11-18 DIAGNOSIS — R97.20 ELEVATED PSA: ICD-10-CM

## 2019-11-18 LAB — PROSTATE SPECIFIC ANTIGEN: 8.14 NG/ML (ref 0–1)

## 2019-12-04 ENCOUNTER — TELEPHONE (OUTPATIENT)
Dept: UROLOGY | Age: 59
End: 2019-12-04

## 2019-12-04 DIAGNOSIS — R97.20 ELEVATED PSA: Primary | ICD-10-CM

## 2020-01-24 ENCOUNTER — NURSE ONLY (OUTPATIENT)
Dept: LAB | Age: 60
End: 2020-01-24

## 2020-01-24 LAB — PROSTATE SPECIFIC ANTIGEN: 6.62 NG/ML (ref 0–1)

## 2020-01-29 ENCOUNTER — OFFICE VISIT (OUTPATIENT)
Dept: UROLOGY | Age: 60
End: 2020-01-29
Payer: COMMERCIAL

## 2020-01-29 VITALS
BODY MASS INDEX: 35.39 KG/M2 | HEIGHT: 74 IN | SYSTOLIC BLOOD PRESSURE: 136 MMHG | DIASTOLIC BLOOD PRESSURE: 86 MMHG | WEIGHT: 275.8 LBS

## 2020-01-29 PROBLEM — N40.1 BENIGN PROSTATIC HYPERPLASIA WITH LOWER URINARY TRACT SYMPTOMS: Status: ACTIVE | Noted: 2020-01-29

## 2020-01-29 LAB
BILIRUBIN URINE: NEGATIVE
BLOOD URINE, POC: ABNORMAL
CHARACTER, URINE: CLEAR
COLOR, URINE: YELLOW
GLUCOSE URINE: NEGATIVE MG/DL
KETONES, URINE: NEGATIVE
LEUKOCYTE CLUMPS, URINE: ABNORMAL
NITRITE, URINE: NEGATIVE
PH, URINE: 6.5 (ref 5–9)
PROTEIN, URINE: NEGATIVE MG/DL
SPECIFIC GRAVITY, URINE: 1.02 (ref 1–1.03)
UROBILINOGEN, URINE: 0.2 EU/DL (ref 0–1)

## 2020-01-29 PROCEDURE — 1036F TOBACCO NON-USER: CPT | Performed by: NURSE PRACTITIONER

## 2020-01-29 PROCEDURE — 99213 OFFICE O/P EST LOW 20 MIN: CPT | Performed by: NURSE PRACTITIONER

## 2020-01-29 PROCEDURE — 3017F COLORECTAL CA SCREEN DOC REV: CPT | Performed by: NURSE PRACTITIONER

## 2020-01-29 PROCEDURE — 81003 URINALYSIS AUTO W/O SCOPE: CPT | Performed by: NURSE PRACTITIONER

## 2020-01-29 PROCEDURE — G8427 DOCREV CUR MEDS BY ELIG CLIN: HCPCS | Performed by: NURSE PRACTITIONER

## 2020-01-29 PROCEDURE — G8484 FLU IMMUNIZE NO ADMIN: HCPCS | Performed by: NURSE PRACTITIONER

## 2020-01-29 PROCEDURE — G8417 CALC BMI ABV UP PARAM F/U: HCPCS | Performed by: NURSE PRACTITIONER

## 2020-01-29 NOTE — PROGRESS NOTES
pathologically enlarged lymphadenopathy. 9. There is a nonspecific 1.2 cm focus of abnormal marrow signal within the posterior column of the right acetabulum which is low signal intensity on the T2-weighted images and increased signal intensity on the postcontrast T1 weighted images (series 3    image 13 and series 14 image 112). 10. PI-RADS assessment category 3.           Assessment & Plan:     Ntahen Oropeza was seen today for follow-up. Diagnoses and all orders for this visit:    Elevated PSA  -     POCT Urinalysis No Micro (Auto)  -     PSA Prostatic Specific Antigen; Standing    Chronic prostatitis    Benign prostatic hyperplasia with lower urinary tract symptoms, symptom details unspecified    Repeat PSA in 6 months and again in a year    Return in about 1 year (around 1/29/2021) for elevated PSA - psa prior.     Meghan Acuna, APRN-CNP  Urology

## 2020-03-17 RX ORDER — AMLODIPINE BESYLATE 5 MG/1
TABLET ORAL
Qty: 90 TABLET | Refills: 3 | Status: SHIPPED | OUTPATIENT
Start: 2020-03-17 | End: 2021-03-25

## 2020-07-28 ENCOUNTER — PATIENT MESSAGE (OUTPATIENT)
Dept: FAMILY MEDICINE CLINIC | Age: 60
End: 2020-07-28

## 2020-07-29 NOTE — TELEPHONE ENCOUNTER
From: Katerina Prado  To: Glenroy España DO  Sent: 7/28/2020 8:02 PM EDT  Subject: Non-Urgent Medical Question    I need blood draw order for my yearly physical next week .

## 2020-07-30 ENCOUNTER — NURSE ONLY (OUTPATIENT)
Dept: LAB | Age: 60
End: 2020-07-30

## 2020-07-30 LAB
ALBUMIN SERPL-MCNC: 4.3 G/DL (ref 3.5–5.1)
ALP BLD-CCNC: 105 U/L (ref 38–126)
ALT SERPL-CCNC: 32 U/L (ref 11–66)
ANION GAP SERPL CALCULATED.3IONS-SCNC: 13 MEQ/L (ref 8–16)
AST SERPL-CCNC: 25 U/L (ref 5–40)
AVERAGE GLUCOSE: 129 MG/DL (ref 70–126)
BASOPHILS # BLD: 0.8 %
BASOPHILS ABSOLUTE: 0.1 THOU/MM3 (ref 0–0.1)
BILIRUB SERPL-MCNC: 0.5 MG/DL (ref 0.3–1.2)
BUN BLDV-MCNC: 12 MG/DL (ref 7–22)
CALCIUM SERPL-MCNC: 9.3 MG/DL (ref 8.5–10.5)
CHLORIDE BLD-SCNC: 108 MEQ/L (ref 98–111)
CHOLESTEROL, TOTAL: 157 MG/DL (ref 100–199)
CO2: 22 MEQ/L (ref 23–33)
CREAT SERPL-MCNC: 1 MG/DL (ref 0.4–1.2)
EOSINOPHIL # BLD: 6 %
EOSINOPHILS ABSOLUTE: 0.5 THOU/MM3 (ref 0–0.4)
ERYTHROCYTE [DISTWIDTH] IN BLOOD BY AUTOMATED COUNT: 13.2 % (ref 11.5–14.5)
ERYTHROCYTE [DISTWIDTH] IN BLOOD BY AUTOMATED COUNT: 45.7 FL (ref 35–45)
GFR SERPL CREATININE-BSD FRML MDRD: 76 ML/MIN/1.73M2
GLUCOSE BLD-MCNC: 103 MG/DL (ref 70–108)
HBA1C MFR BLD: 6.3 % (ref 4.4–6.4)
HCT VFR BLD CALC: 47.8 % (ref 42–52)
HDLC SERPL-MCNC: 54 MG/DL
HEMOGLOBIN: 15.5 GM/DL (ref 14–18)
IMMATURE GRANS (ABS): 0.03 THOU/MM3 (ref 0–0.07)
IMMATURE GRANULOCYTES: 0.4 %
LDL CHOLESTEROL CALCULATED: 83 MG/DL
LYMPHOCYTES # BLD: 25.1 %
LYMPHOCYTES ABSOLUTE: 2 THOU/MM3 (ref 1–4.8)
MAGNESIUM: 2 MG/DL (ref 1.6–2.4)
MCH RBC QN AUTO: 30.6 PG (ref 26–33)
MCHC RBC AUTO-ENTMCNC: 32.4 GM/DL (ref 32.2–35.5)
MCV RBC AUTO: 94.3 FL (ref 80–94)
MONOCYTES # BLD: 6.5 %
MONOCYTES ABSOLUTE: 0.5 THOU/MM3 (ref 0.4–1.3)
NUCLEATED RED BLOOD CELLS: 0 /100 WBC
PLATELET # BLD: 208 THOU/MM3 (ref 130–400)
PMV BLD AUTO: 10.1 FL (ref 9.4–12.4)
POTASSIUM SERPL-SCNC: 3.6 MEQ/L (ref 3.5–5.2)
PROSTATE SPECIFIC ANTIGEN: 7.74 NG/ML (ref 0–1)
RBC # BLD: 5.07 MILL/MM3 (ref 4.7–6.1)
SEG NEUTROPHILS: 61.2 %
SEGMENTED NEUTROPHILS ABSOLUTE COUNT: 4.8 THOU/MM3 (ref 1.8–7.7)
SODIUM BLD-SCNC: 143 MEQ/L (ref 135–145)
TOTAL PROTEIN: 6.9 G/DL (ref 6.1–8)
TRIGL SERPL-MCNC: 98 MG/DL (ref 0–199)
TSH SERPL DL<=0.05 MIU/L-ACNC: 0.59 UIU/ML (ref 0.4–4.2)
WBC # BLD: 7.8 THOU/MM3 (ref 4.8–10.8)

## 2020-08-04 ENCOUNTER — OFFICE VISIT (OUTPATIENT)
Dept: FAMILY MEDICINE CLINIC | Age: 60
End: 2020-08-04
Payer: COMMERCIAL

## 2020-08-04 VITALS
BODY MASS INDEX: 35.32 KG/M2 | WEIGHT: 275.2 LBS | HEIGHT: 74 IN | RESPIRATION RATE: 20 BRPM | DIASTOLIC BLOOD PRESSURE: 82 MMHG | HEART RATE: 100 BPM | SYSTOLIC BLOOD PRESSURE: 138 MMHG | TEMPERATURE: 97.5 F

## 2020-08-04 PROCEDURE — 3017F COLORECTAL CA SCREEN DOC REV: CPT | Performed by: FAMILY MEDICINE

## 2020-08-04 PROCEDURE — 99214 OFFICE O/P EST MOD 30 MIN: CPT | Performed by: FAMILY MEDICINE

## 2020-08-04 PROCEDURE — G8417 CALC BMI ABV UP PARAM F/U: HCPCS | Performed by: FAMILY MEDICINE

## 2020-08-04 PROCEDURE — 1036F TOBACCO NON-USER: CPT | Performed by: FAMILY MEDICINE

## 2020-08-04 PROCEDURE — G8427 DOCREV CUR MEDS BY ELIG CLIN: HCPCS | Performed by: FAMILY MEDICINE

## 2020-08-04 ASSESSMENT — PATIENT HEALTH QUESTIONNAIRE - PHQ9
SUM OF ALL RESPONSES TO PHQ QUESTIONS 1-9: 0
SUM OF ALL RESPONSES TO PHQ9 QUESTIONS 1 & 2: 0
SUM OF ALL RESPONSES TO PHQ QUESTIONS 1-9: 0
1. LITTLE INTEREST OR PLEASURE IN DOING THINGS: 0
2. FEELING DOWN, DEPRESSED OR HOPELESS: 0

## 2020-08-04 ASSESSMENT — ENCOUNTER SYMPTOMS
RESPIRATORY NEGATIVE: 1
GASTROINTESTINAL NEGATIVE: 1

## 2020-08-04 NOTE — PROGRESS NOTES
 Colon cancer screen colonoscopy  09/29/2027    Hepatitis A vaccine  Aged Out    Hepatitis B vaccine  Aged Out    Hib vaccine  Aged Out    Meningococcal (ACWY) vaccine  Aged Out    Pneumococcal 0-64 years Vaccine  Aged Out

## 2020-08-04 NOTE — PROGRESS NOTES
Subjective:      Patient ID: Jem Rosas is a 62 y.o. male. HPI:    Chief Complaint   Patient presents with    Annual Exam     Annual eval.  Doing well overall. BP doing fine. BP Readings from Last 3 Encounters:   08/04/20 138/82   01/29/20 136/86   07/31/19 124/76     Weight stable from last year. Wt Readings from Last 3 Encounters:   08/04/20 275 lb 3.2 oz (124.8 kg)   01/29/20 275 lb 12.8 oz (125.1 kg)   07/31/19 274 lb 6.4 oz (124.5 kg)     Tolerating medications, compliant. Hx of chronic prostatitis, follows closely with Urology. Symptoms stable. Lab Results   Component Value Date    PSA 7.74 (H) 07/30/2020    PSA 6.62 (H) 01/24/2020    PSA 8.14 (H) 11/18/2019       Patient Active Problem List   Diagnosis    HTN (hypertension)    IFG (impaired fasting glucose)    Hyperlipidemia with target LDL less than 100    Elevated PSA    Chronic prostatitis    Benign prostatic hyperplasia with lower urinary tract symptoms     Past Surgical History:   Procedure Laterality Date    ACHILLES TENDON SURGERY      CORNEAL TRANSPLANT  7/2013    EYE SURGERY  2014    \"re-work on corneal trasplant\"    [place] EGD FLEXIBLE FOREIGN BODY REMOVAL Left 1/18/2018    EGD FOREIGN BODY REMOVAL performed by Barbara Rodríguez MD at [place] [address]  2-4-2014    bx- negative     Prior to Admission medications    Medication Sig Start Date End Date Taking? Authorizing Provider   amLODIPine (NORVASC) 5 MG tablet TAKE 1 TABLET BY MOUTH ONE TIME A DAY 3/17/20  Yes Jessenia Lauren DO   atorvastatin (LIPITOR) 20 MG tablet TAKE 1 TABLET BY MOUTH ONE TIME A DAY  11/1/19  Yes Jessenia Lauren DO   aspirin 81 MG tablet Take 81 mg by mouth daily   Yes Historical Provider, MD   PrednisoLONE Acetate (PRED FORTE OP) Apply to eye   Yes Historical Provider, MD   MULTIPLE VITAMIN PO Take  by mouth.    Yes Historical Provider, MD       Lab Results   Component Value Date    LABA1C 6.3 07/30/2020     No results found for: EAG    No components found for: CHLPL  Lab Results   Component Value Date    TRIG 98 07/30/2020    TRIG 78 07/16/2019    TRIG 83 07/30/2018     Lab Results   Component Value Date    HDL 54 07/30/2020    HDL 85 07/16/2019    HDL 60 07/30/2018     Lab Results   Component Value Date    LDLCALC 83 07/30/2020    LDLCALC 61 07/16/2019    LDLCALC 61 07/30/2018     No results found for: LABVLDL      Chemistry        Component Value Date/Time     07/30/2020 1030    K 3.6 07/30/2020 1030     07/30/2020 1030    CO2 22 (L) 07/30/2020 1030    BUN 12 07/30/2020 1030    CREATININE 1.0 07/30/2020 1030        Component Value Date/Time    CALCIUM 9.3 07/30/2020 1030    ALKPHOS 105 07/30/2020 1030    AST 25 07/30/2020 1030    ALT 32 07/30/2020 1030    BILITOT 0.5 07/30/2020 1030            Lab Results   Component Value Date    TSH 0.587 07/30/2020       Lab Results   Component Value Date    WBC 7.8 07/30/2020    HGB 15.5 07/30/2020    HCT 47.8 07/30/2020    MCV 94.3 (H) 07/30/2020     07/30/2020         Health Maintenance   Topic Date Due    Hepatitis C screen  1960    HIV screen  11/24/1973    Shingles Vaccine (1 of 2) 11/24/2008    Flu vaccine (1) 09/01/2020    A1C test (Diabetic or Prediabetic)  07/30/2021    Lipid screen  07/30/2021    PSA counseling  07/30/2021    DTaP/Tdap/Td vaccine (2 - Td) 04/09/2025    Colon cancer screen colonoscopy  09/29/2027    Hepatitis A vaccine  Aged Out    Hepatitis B vaccine  Aged Out    Hib vaccine  Aged Out    Meningococcal (ACWY) vaccine  Aged Out    Pneumococcal 0-64 years Vaccine  Aged [employer] History   Administered Date(s) Administered    Influenza Vaccine, unspecified formulation 11/01/2016    Influenza Virus Vaccine 11/01/2014, 10/22/2015, 10/06/2019    Influenza, Triv, 3 Years and older, IM (Afluria (5 yrs and older) 09/15/2017    Tdap (Boostrix, Adacel) 04/09/2015           Review of Systems   Constitutional: Negative.     HENT:

## 2020-09-04 ENCOUNTER — NURSE ONLY (OUTPATIENT)
Dept: LAB | Age: 60
End: 2020-09-04

## 2020-09-04 LAB
BILIRUBIN URINE: NEGATIVE
BLOOD, URINE: NEGATIVE
CHARACTER, URINE: CLEAR
COLOR: YELLOW
GLUCOSE, URINE: NEGATIVE MG/DL
KETONES, URINE: NEGATIVE
LEUKOCYTE EST, POC: NEGATIVE
NITRITE, URINE: NEGATIVE
PH UA: 5.5 (ref 5–9)
PROTEIN UA: NEGATIVE MG/DL
SPECIFIC GRAVITY UA: 1.01 (ref 1–1.03)
UROBILINOGEN, URINE: 0.2 EU/DL (ref 0–1)

## 2020-09-06 LAB — URINE CULTURE, ROUTINE: NORMAL

## 2020-09-16 ENCOUNTER — TELEMEDICINE (OUTPATIENT)
Dept: FAMILY MEDICINE CLINIC | Age: 60
End: 2020-09-16
Payer: COMMERCIAL

## 2020-09-16 ENCOUNTER — TELEPHONE (OUTPATIENT)
Dept: FAMILY MEDICINE CLINIC | Age: 60
End: 2020-09-16

## 2020-09-16 PROCEDURE — 3017F COLORECTAL CA SCREEN DOC REV: CPT | Performed by: FAMILY MEDICINE

## 2020-09-16 PROCEDURE — 99213 OFFICE O/P EST LOW 20 MIN: CPT | Performed by: FAMILY MEDICINE

## 2020-09-16 PROCEDURE — G8428 CUR MEDS NOT DOCUMENT: HCPCS | Performed by: FAMILY MEDICINE

## 2020-09-16 ASSESSMENT — ENCOUNTER SYMPTOMS
RESPIRATORY NEGATIVE: 1
GASTROINTESTINAL NEGATIVE: 1

## 2020-09-16 NOTE — TELEPHONE ENCOUNTER
Forms received from the patients employer, per Dr. Yaima Bradshaw these forms are for the patient to fill out not our office. Called and left a VM asking the patient to call the office back to update him and see if he wants to come and get the forms or just call his employer and have them sent to him. Form is in the nurse bin.

## 2020-09-16 NOTE — PROGRESS NOTES
Systems   Constitutional: Negative. HENT: Negative. Respiratory: Negative. Cardiovascular: Negative. Gastrointestinal: Negative. Genitourinary: Positive for difficulty urinating, frequency and urgency. Negative for hematuria. Musculoskeletal: Negative. Psychiatric/Behavioral: The patient is nervous/anxious. All other systems reviewed and are negative. Objective:   Physical Exam  Constitutional:       General: He is not in acute distress. Appearance: Normal appearance. He is well-developed. He is not ill-appearing. HENT:      Head: Normocephalic and atraumatic. Right Ear: External ear normal.      Left Ear: External ear normal.   Eyes:      Conjunctiva/sclera: Conjunctivae normal.   Pulmonary:      Effort: Pulmonary effort is normal. No respiratory distress. Skin:     Findings: No rash (on exposed surfaces). Neurological:      Mental Status: He is alert and oriented to person, place, and time. Psychiatric:         Mood and Affect: Mood normal.         Behavior: Behavior normal.         Thought Content: Thought content normal.         Judgment: Judgment normal.         Assessment:       Diagnosis Orders   1. Urinary frequency     2. Benign prostatic hyperplasia with lower urinary tract symptoms, symptom details unspecified     3. Chronic prostatitis     4.  Acute stress reaction             Plan:      -  Urinary symptoms improved at this time  -  Declines maintenance medication like Flomax  -  No abx given today  -  Will hold off work from 9/12 thru 9/20  -  RTO prn        Donata Level, DO

## 2020-09-22 ENCOUNTER — TELEMEDICINE (OUTPATIENT)
Dept: FAMILY MEDICINE CLINIC | Age: 60
End: 2020-09-22
Payer: COMMERCIAL

## 2020-09-22 PROCEDURE — G8428 CUR MEDS NOT DOCUMENT: HCPCS | Performed by: FAMILY MEDICINE

## 2020-09-22 PROCEDURE — 99213 OFFICE O/P EST LOW 20 MIN: CPT | Performed by: FAMILY MEDICINE

## 2020-09-22 PROCEDURE — 3017F COLORECTAL CA SCREEN DOC REV: CPT | Performed by: FAMILY MEDICINE

## 2020-09-22 ASSESSMENT — ENCOUNTER SYMPTOMS
GASTROINTESTINAL NEGATIVE: 1
RESPIRATORY NEGATIVE: 1

## 2020-09-22 NOTE — LETTER
[address] [id number]  Phone: [phone]  Fax: [address],         September 22, 2020     Patient: Michell Zambrano   YOB: 1960   Date of Visit: 9/22/2020       To Whom It May Concern: It is my medical opinion that [employer] may return to work on 9/29/20. Please excuse 9/12 thru 9/28/20. If you have any questions or concerns, please don't hesitate to call.     Sincerely,        Freya Wells, DO

## 2020-09-22 NOTE — PROGRESS NOTES
1Subjective:      Patient ID: Jem Rosas is a 62 y.o. male. HPI:    Chief Complaint   Patient presents with   [address] (:  1960) has requested an audio/video evaluation for the following concern(s):    1 week eval.  Still fighting some anxiety and would like to take 1 more week off work. Would like to return on the . See previous note for additional details. Patient Active Problem List   Diagnosis    HTN (hypertension)    IFG (impaired fasting glucose)    Hyperlipidemia with target LDL less than 100    Elevated PSA    Chronic prostatitis    Benign prostatic hyperplasia with lower urinary tract symptoms     Past Surgical History:   Procedure Laterality Date    ACHILLES TENDON SURGERY      CORNEAL TRANSPLANT  2013    EYE SURGERY      \"re-work on corneal trasplant\"    [place] EGD FLEXIBLE FOREIGN BODY REMOVAL Left 2018    EGD FOREIGN BODY REMOVAL performed by Barbara Rodríguez MD at [place] [address]  2014    bx- negative     Prior to Admission medications    Medication Sig Start Date End Date Taking? Authorizing Provider   amLODIPine (NORVASC) 5 MG tablet TAKE 1 TABLET BY MOUTH ONE TIME A DAY 3/17/20   Jessenia Lauren DO   atorvastatin (LIPITOR) 20 MG tablet TAKE 1 TABLET BY MOUTH ONE TIME A DAY  19   Jessenia Lauren,    aspirin 81 MG tablet Take 81 mg by mouth daily    Historical Provider, MD   PrednisoLONE Acetate (PRED FORTE OP) Apply to eye    Historical Provider, MD   MULTIPLE VITAMIN PO Take  by mouth. Historical Provider, MD         Review of Systems   Constitutional: Negative. HENT: Negative. Respiratory: Negative. Cardiovascular: Negative. Gastrointestinal: Negative. Musculoskeletal: Negative. Psychiatric/Behavioral: The patient is nervous/anxious. All other systems reviewed and are negative.       Objective:   Physical Exam  Constitutional:       General: He is not in acute distress. Appearance: Normal appearance. He is well-developed. He is not ill-appearing. HENT:      Head: Normocephalic and atraumatic. Right Ear: External ear normal.      Left Ear: External ear normal.   Eyes:      Conjunctiva/sclera: Conjunctivae normal.   Pulmonary:      Effort: Pulmonary effort is normal. No respiratory distress. Skin:     Findings: No rash (on exposed surfaces). Neurological:      Mental Status: He is alert and oriented to person, place, and time. Psychiatric:         Mood and Affect: Mood normal.         Behavior: Behavior normal.         Thought Content: Thought content normal.         Judgment: Judgment normal.         Assessment:       Diagnosis Orders   1. Acute stress reaction     2. Anxiety             Plan:      -  Will hold out of work for an additional week  -  Letter sent to patient, he will get me disability forms  -  RTO prn    Due to this being a TeleHealth encounter (During ZAXBG-02 public health emergency), evaluation of the following organ systems was limited: Vitals/Constitutional/EENT/Resp/CV/GI//MS/Neuro/Skin/Heme-Lymph-Imm. Pursuant to the emergency declaration under the [address] [place] [address], [address] [place] [address] authority and the [employer] and [employer] Act, this Virtual Visit was conducted with patient's (and/or legal guardian's) consent, to reduce the patient's risk of exposure to COVID-19 and provide necessary medical care. The patient (and/or legal guardian) has also been advised to contact this office for worsening conditions or problems, and seek emergency medical treatment and/or call 911 if deemed necessary. Patient identification was verified at the start of the visit: Yes    Total time spent for this encounter: Not billed by time    Services were provided through a video synchronous discussion virtually to substitute for in-person clinic visit.  Patient and provider were located at their individual homes.           Alicia Guerra, DO

## 2020-09-25 ENCOUNTER — APPOINTMENT (OUTPATIENT)
Dept: INTERVENTIONAL RADIOLOGY/VASCULAR | Age: 60
DRG: 683 | End: 2020-09-25
Payer: COMMERCIAL

## 2020-09-25 ENCOUNTER — HOSPITAL ENCOUNTER (INPATIENT)
Age: 60
LOS: 8 days | Discharge: HOME | DRG: 683 | End: 2020-10-03
Attending: INTERNAL MEDICINE | Admitting: INTERNAL MEDICINE
Payer: COMMERCIAL

## 2020-09-25 ENCOUNTER — APPOINTMENT (OUTPATIENT)
Dept: GENERAL RADIOLOGY | Age: 60
DRG: 683 | End: 2020-09-25
Payer: COMMERCIAL

## 2020-09-25 PROBLEM — N17.9 ACUTE KIDNEY INJURY (HCC): Status: ACTIVE | Noted: 2020-09-25

## 2020-09-25 LAB
ALBUMIN SERPL-MCNC: 4.2 G/DL (ref 3.5–5.1)
ALP BLD-CCNC: 88 U/L (ref 38–126)
ALT SERPL-CCNC: 51 U/L (ref 11–66)
ANION GAP SERPL CALCULATED.3IONS-SCNC: 31 MEQ/L (ref 8–16)
ANION GAP SERPL CALCULATED.3IONS-SCNC: 35 MEQ/L (ref 8–16)
AST SERPL-CCNC: 30 U/L (ref 5–40)
BACTERIA: ABNORMAL /HPF
BASOPHILS # BLD: 0.3 %
BASOPHILS ABSOLUTE: 0 THOU/MM3 (ref 0–0.1)
BILIRUB SERPL-MCNC: 0.4 MG/DL (ref 0.3–1.2)
BILIRUBIN DIRECT: < 0.2 MG/DL (ref 0–0.3)
BILIRUBIN URINE: NEGATIVE
BLOOD, URINE: ABNORMAL
BUN BLDV-MCNC: 165 MG/DL (ref 7–22)
BUN BLDV-MCNC: 173 MG/DL (ref 7–22)
C-REACTIVE PROTEIN: 0.38 MG/DL (ref 0–1)
CALCIUM SERPL-MCNC: 8.9 MG/DL (ref 8.5–10.5)
CALCIUM SERPL-MCNC: 9 MG/DL (ref 8.5–10.5)
CASTS 2: ABNORMAL /LPF
CASTS UA: ABNORMAL /LPF
CHARACTER, URINE: CLEAR
CHLORIDE BLD-SCNC: 107 MEQ/L (ref 98–111)
CHLORIDE BLD-SCNC: 108 MEQ/L (ref 98–111)
CO2: 6 MEQ/L (ref 23–33)
CO2: 7 MEQ/L (ref 23–33)
COLOR: YELLOW
CREAT SERPL-MCNC: 49.5 MG/DL (ref 0.4–1.2)
CREAT SERPL-MCNC: 51 MG/DL (ref 0.4–1.2)
CRYSTALS, UA: ABNORMAL
EOSINOPHIL # BLD: 0.5 %
EOSINOPHILS ABSOLUTE: 0 THOU/MM3 (ref 0–0.4)
EPITHELIAL CELLS, UA: ABNORMAL /HPF
ERYTHROCYTE [DISTWIDTH] IN BLOOD BY AUTOMATED COUNT: 14.7 % (ref 11.5–14.5)
ERYTHROCYTE [DISTWIDTH] IN BLOOD BY AUTOMATED COUNT: 51.5 FL (ref 35–45)
FLU A ANTIGEN: NEGATIVE
FLU B ANTIGEN: NEGATIVE
GFR SERPL CREATININE-BSD FRML MDRD: 1 ML/MIN/1.73M2
GFR SERPL CREATININE-BSD FRML MDRD: 1 ML/MIN/1.73M2
GLUCOSE BLD-MCNC: 154 MG/DL (ref 70–108)
GLUCOSE BLD-MCNC: 80 MG/DL (ref 70–108)
GLUCOSE BLD-MCNC: 83 MG/DL (ref 70–108)
GLUCOSE BLD-MCNC: 85 MG/DL (ref 70–108)
GLUCOSE BLD-MCNC: 90 MG/DL (ref 70–108)
GLUCOSE URINE: NEGATIVE MG/DL
HCT VFR BLD CALC: 39.4 % (ref 42–52)
HEMOGLOBIN: 12.7 GM/DL (ref 14–18)
IMMATURE GRANS (ABS): 0.05 THOU/MM3 (ref 0–0.07)
IMMATURE GRANULOCYTES: 0.5 %
KETONES, URINE: ABNORMAL
LACTIC ACID: 1.3 MMOL/L (ref 0.5–2.2)
LD: 559 U/L (ref 100–190)
LEUKOCYTE ESTERASE, URINE: NEGATIVE
LIPASE: 71.9 U/L (ref 5.6–51.3)
LYMPHOCYTES # BLD: 9.7 %
LYMPHOCYTES ABSOLUTE: 0.9 THOU/MM3 (ref 1–4.8)
MCH RBC QN AUTO: 30.8 PG (ref 26–33)
MCHC RBC AUTO-ENTMCNC: 32.2 GM/DL (ref 32.2–35.5)
MCV RBC AUTO: 95.4 FL (ref 80–94)
MISCELLANEOUS 2: ABNORMAL
MONOCYTES # BLD: 4.5 %
MONOCYTES ABSOLUTE: 0.4 THOU/MM3 (ref 0.4–1.3)
NITRITE, URINE: NEGATIVE
NUCLEATED RED BLOOD CELLS: 0 /100 WBC
OSMOLALITY CALCULATION: 342.2 MOSMOL/KG (ref 275–300)
OSMOLALITY CALCULATION: 352.4 MOSMOL/KG (ref 275–300)
PH UA: 5.5 (ref 5–9)
PLATELET # BLD: 149 THOU/MM3 (ref 130–400)
PMV BLD AUTO: 9.6 FL (ref 9.4–12.4)
POTASSIUM REFLEX MAGNESIUM: 8.1 MEQ/L (ref 3.5–5.2)
POTASSIUM SERPL-SCNC: 5.9 MEQ/L (ref 3.5–5.2)
POTASSIUM SERPL-SCNC: 8.1 MEQ/L (ref 3.5–5.2)
PROCALCITONIN: 0.14 NG/ML (ref 0.01–0.09)
PROTEIN UA: NEGATIVE
RBC # BLD: 4.13 MILL/MM3 (ref 4.7–6.1)
RBC URINE: ABNORMAL /HPF
RENAL EPITHELIAL, UA: ABNORMAL
SARS-COV-2, NAAT: NOT DETECTED
SEG NEUTROPHILS: 84.5 %
SEGMENTED NEUTROPHILS ABSOLUTE COUNT: 7.9 THOU/MM3 (ref 1.8–7.7)
SODIUM BLD-SCNC: 145 MEQ/L (ref 135–145)
SODIUM BLD-SCNC: 149 MEQ/L (ref 135–145)
SPECIFIC GRAVITY, URINE: 1.01 (ref 1–1.03)
TOTAL PROTEIN: 6.7 G/DL (ref 6.1–8)
TROPONIN T: < 0.01 NG/ML
TSH SERPL DL<=0.05 MIU/L-ACNC: 0.4 UIU/ML (ref 0.4–4.2)
UROBILINOGEN, URINE: 0.2 EU/DL (ref 0–1)
WBC # BLD: 9.4 THOU/MM3 (ref 4.8–10.8)
WBC UA: ABNORMAL /HPF
YEAST: ABNORMAL

## 2020-09-25 PROCEDURE — 76937 US GUIDE VASCULAR ACCESS: CPT

## 2020-09-25 PROCEDURE — 99291 CRITICAL CARE FIRST HOUR: CPT | Performed by: INTERNAL MEDICINE

## 2020-09-25 PROCEDURE — 99285 EMERGENCY DEPT VISIT HI MDM: CPT

## 2020-09-25 PROCEDURE — 02H633Z INSERTION OF INFUSION DEVICE INTO RIGHT ATRIUM, PERCUTANEOUS APPROACH: ICD-10-PCS | Performed by: RADIOLOGY

## 2020-09-25 PROCEDURE — 87641 MR-STAPH DNA AMP PROBE: CPT

## 2020-09-25 PROCEDURE — 6370000000 HC RX 637 (ALT 250 FOR IP): Performed by: PHYSICIAN ASSISTANT

## 2020-09-25 PROCEDURE — 96361 HYDRATE IV INFUSION ADD-ON: CPT

## 2020-09-25 PROCEDURE — 94761 N-INVAS EAR/PLS OXIMETRY MLT: CPT

## 2020-09-25 PROCEDURE — 71045 X-RAY EXAM CHEST 1 VIEW: CPT

## 2020-09-25 PROCEDURE — 96375 TX/PRO/DX INJ NEW DRUG ADDON: CPT

## 2020-09-25 PROCEDURE — 2709999900 HC NON-CHARGEABLE SUPPLY

## 2020-09-25 PROCEDURE — 77001 FLUOROGUIDE FOR VEIN DEVICE: CPT

## 2020-09-25 PROCEDURE — 83690 ASSAY OF LIPASE: CPT

## 2020-09-25 PROCEDURE — 83605 ASSAY OF LACTIC ACID: CPT

## 2020-09-25 PROCEDURE — 2000000000 HC ICU R&B

## 2020-09-25 PROCEDURE — 6360000002 HC RX W HCPCS: Performed by: PHYSICIAN ASSISTANT

## 2020-09-25 PROCEDURE — 93005 ELECTROCARDIOGRAM TRACING: CPT | Performed by: NURSE PRACTITIONER

## 2020-09-25 PROCEDURE — 0T9B70Z DRAINAGE OF BLADDER WITH DRAINAGE DEVICE, VIA NATURAL OR ARTIFICIAL OPENING: ICD-10-PCS | Performed by: INTERNAL MEDICINE

## 2020-09-25 PROCEDURE — 87804 INFLUENZA ASSAY W/OPTIC: CPT

## 2020-09-25 PROCEDURE — 96374 THER/PROPH/DIAG INJ IV PUSH: CPT

## 2020-09-25 PROCEDURE — C1752 CATH,HEMODIALYSIS,SHORT-TERM: HCPCS

## 2020-09-25 PROCEDURE — 83615 LACTATE (LD) (LDH) ENZYME: CPT

## 2020-09-25 PROCEDURE — 82948 REAGENT STRIP/BLOOD GLUCOSE: CPT

## 2020-09-25 PROCEDURE — 94640 AIRWAY INHALATION TREATMENT: CPT

## 2020-09-25 PROCEDURE — 87500 VANOMYCIN DNA AMP PROBE: CPT

## 2020-09-25 PROCEDURE — 87040 BLOOD CULTURE FOR BACTERIA: CPT

## 2020-09-25 PROCEDURE — 84145 PROCALCITONIN (PCT): CPT

## 2020-09-25 PROCEDURE — 2500000003 HC RX 250 WO HCPCS

## 2020-09-25 PROCEDURE — 82248 BILIRUBIN DIRECT: CPT

## 2020-09-25 PROCEDURE — C1769 GUIDE WIRE: HCPCS

## 2020-09-25 PROCEDURE — U0002 COVID-19 LAB TEST NON-CDC: HCPCS

## 2020-09-25 PROCEDURE — 87081 CULTURE SCREEN ONLY: CPT

## 2020-09-25 PROCEDURE — 84484 ASSAY OF TROPONIN QUANT: CPT

## 2020-09-25 PROCEDURE — C1894 INTRO/SHEATH, NON-LASER: HCPCS

## 2020-09-25 PROCEDURE — 81001 URINALYSIS AUTO W/SCOPE: CPT

## 2020-09-25 PROCEDURE — 6360000002 HC RX W HCPCS

## 2020-09-25 PROCEDURE — 86140 C-REACTIVE PROTEIN: CPT

## 2020-09-25 PROCEDURE — 85025 COMPLETE CBC W/AUTO DIFF WBC: CPT

## 2020-09-25 PROCEDURE — 84443 ASSAY THYROID STIM HORMONE: CPT

## 2020-09-25 PROCEDURE — APPSS180 APP SPLIT SHARED TIME > 60 MINUTES: Performed by: NURSE PRACTITIONER

## 2020-09-25 PROCEDURE — 2580000003 HC RX 258: Performed by: NURSE PRACTITIONER

## 2020-09-25 PROCEDURE — 36415 COLL VENOUS BLD VENIPUNCTURE: CPT

## 2020-09-25 PROCEDURE — 80053 COMPREHEN METABOLIC PANEL: CPT

## 2020-09-25 PROCEDURE — 36556 INSERT NON-TUNNEL CV CATH: CPT

## 2020-09-25 PROCEDURE — 2580000003 HC RX 258: Performed by: PHYSICIAN ASSISTANT

## 2020-09-25 RX ORDER — NICOTINE POLACRILEX 4 MG
15 LOZENGE BUCCAL PRN
Status: DISCONTINUED | OUTPATIENT
Start: 2020-09-25 | End: 2020-10-03 | Stop reason: HOSPADM

## 2020-09-25 RX ORDER — ASPIRIN 81 MG/1
81 TABLET ORAL DAILY
Status: DISCONTINUED | OUTPATIENT
Start: 2020-09-26 | End: 2020-10-03 | Stop reason: HOSPADM

## 2020-09-25 RX ORDER — AMLODIPINE BESYLATE 5 MG/1
5 TABLET ORAL DAILY
Status: DISCONTINUED | OUTPATIENT
Start: 2020-09-26 | End: 2020-09-29

## 2020-09-25 RX ORDER — ATROPA BELLADONNA AND OPIUM 16.2; 3 MG/1; MG/1
30 SUPPOSITORY RECTAL EVERY 8 HOURS PRN
Status: DISCONTINUED | OUTPATIENT
Start: 2020-09-25 | End: 2020-10-03 | Stop reason: HOSPADM

## 2020-09-25 RX ORDER — ATORVASTATIN CALCIUM 20 MG/1
20 TABLET, FILM COATED ORAL NIGHTLY
Status: DISCONTINUED | OUTPATIENT
Start: 2020-09-26 | End: 2020-10-03 | Stop reason: HOSPADM

## 2020-09-25 RX ORDER — DEXTROSE MONOHYDRATE 25 G/50ML
12.5 INJECTION, SOLUTION INTRAVENOUS PRN
Status: DISCONTINUED | OUTPATIENT
Start: 2020-09-25 | End: 2020-10-03 | Stop reason: HOSPADM

## 2020-09-25 RX ORDER — DEXTROSE MONOHYDRATE 50 MG/ML
100 INJECTION, SOLUTION INTRAVENOUS PRN
Status: DISCONTINUED | OUTPATIENT
Start: 2020-09-25 | End: 2020-10-03 | Stop reason: HOSPADM

## 2020-09-25 RX ORDER — MULTIVITAMIN WITH IRON
1 TABLET ORAL DAILY
Status: DISCONTINUED | OUTPATIENT
Start: 2020-09-26 | End: 2020-10-03 | Stop reason: HOSPADM

## 2020-09-25 RX ORDER — DEXTROSE MONOHYDRATE 25 G/50ML
25 INJECTION, SOLUTION INTRAVENOUS ONCE
Status: COMPLETED | OUTPATIENT
Start: 2020-09-25 | End: 2020-09-25

## 2020-09-25 RX ORDER — SODIUM CHLORIDE 9 MG/ML
INJECTION, SOLUTION INTRAVENOUS CONTINUOUS
Status: DISCONTINUED | OUTPATIENT
Start: 2020-09-26 | End: 2020-09-26

## 2020-09-25 RX ORDER — CALCIUM GLUCONATE 94 MG/ML
1 INJECTION, SOLUTION INTRAVENOUS ONCE
Status: COMPLETED | OUTPATIENT
Start: 2020-09-25 | End: 2020-09-25

## 2020-09-25 RX ORDER — 0.9 % SODIUM CHLORIDE 0.9 %
500 INTRAVENOUS SOLUTION INTRAVENOUS ONCE
Status: COMPLETED | OUTPATIENT
Start: 2020-09-25 | End: 2020-09-25

## 2020-09-25 RX ADMIN — Medication 10 UNITS: at 21:58

## 2020-09-25 RX ADMIN — ALBUTEROL SULFATE 10 MG: 2.5 SOLUTION RESPIRATORY (INHALATION) at 21:56

## 2020-09-25 RX ADMIN — CALCIUM GLUCONATE 1 G: 98 INJECTION, SOLUTION INTRAVENOUS at 21:56

## 2020-09-25 RX ADMIN — SODIUM CHLORIDE 500 ML: 9 INJECTION, SOLUTION INTRAVENOUS at 19:05

## 2020-09-25 RX ADMIN — DEXTROSE MONOHYDRATE 25 G: 500 INJECTION PARENTERAL at 21:57

## 2020-09-25 NOTE — ED TRIAGE NOTES
Presents to ED with c/o sob that started about 2 weeks ago and has been constant. Denies pain. Alert and oriented. Respirations easy and unlabored. EKG complete.

## 2020-09-25 NOTE — ED NOTES
ED nurse-to-nurse bedside report    Chief Complaint   Patient presents with    Shortness of Breath      LOC: alert and orientated to name, place, date  Vital signs   Vitals:    09/25/20 1841   BP: (!) 173/110   Pulse: 132   Resp: 18   Temp: 98.3 °F (36.8 °C)   TempSrc: Oral   SpO2: 100%   Weight: 280 lb (127 kg)   Height: 6' 2\" (1.88 m)      Pain:    Pain Interventions: none  Pain Goal: 0  Oxygen: No    Current needs required none   Telemetry: Yes  LDAs:   Peripheral IV 09/25/20 Right Antecubital (Active)   Site Assessment Clean;Dry; Intact 09/25/20 1849   Dressing Status Clean;Dry; Intact 09/25/20 1849     Continuous Infusions:   Mobility: Requires assistance * 1  [place] Fall Risk Score: No flowsheet data found.   Fall Interventions:   Report given to: Eliza Obregon RN  09/25/20 [id number]

## 2020-09-25 NOTE — ED NOTES
This RN taking over care of pt. Pt respirations easy and unlabored. Pt started on fluid bolus. Pt denies any further needs at this time. Pt updated and verbalized understanding of urine sample.       Karl Laguerre RN  09/25/20 [id number]

## 2020-09-26 ENCOUNTER — APPOINTMENT (OUTPATIENT)
Dept: ULTRASOUND IMAGING | Age: 60
DRG: 683 | End: 2020-09-26
Payer: COMMERCIAL

## 2020-09-26 ENCOUNTER — APPOINTMENT (OUTPATIENT)
Dept: GENERAL RADIOLOGY | Age: 60
DRG: 683 | End: 2020-09-26
Payer: COMMERCIAL

## 2020-09-26 LAB
ANION GAP SERPL CALCULATED.3IONS-SCNC: 15 MEQ/L (ref 8–16)
ANION GAP SERPL CALCULATED.3IONS-SCNC: 21 MEQ/L (ref 8–16)
ANION GAP SERPL CALCULATED.3IONS-SCNC: 23 MEQ/L (ref 8–16)
ANION GAP SERPL CALCULATED.3IONS-SCNC: 31 MEQ/L (ref 8–16)
AVERAGE GLUCOSE: 123 MG/DL (ref 70–126)
BASOPHILS # BLD: 0.2 %
BASOPHILS ABSOLUTE: 0 THOU/MM3 (ref 0–0.1)
BUN BLDV-MCNC: 102 MG/DL (ref 7–22)
BUN BLDV-MCNC: 155 MG/DL (ref 7–22)
BUN BLDV-MCNC: 50 MG/DL (ref 7–22)
BUN BLDV-MCNC: 77 MG/DL (ref 7–22)
CALCIUM SERPL-MCNC: 9.1 MG/DL (ref 8.5–10.5)
CALCIUM SERPL-MCNC: 9.4 MG/DL (ref 8.5–10.5)
CALCIUM SERPL-MCNC: 9.5 MG/DL (ref 8.5–10.5)
CALCIUM SERPL-MCNC: 9.6 MG/DL (ref 8.5–10.5)
CHLORIDE BLD-SCNC: 113 MEQ/L (ref 98–111)
CHLORIDE BLD-SCNC: 118 MEQ/L (ref 98–111)
CHLORIDE BLD-SCNC: 118 MEQ/L (ref 98–111)
CHLORIDE BLD-SCNC: 120 MEQ/L (ref 98–111)
CHLORIDE, URINE: 58 MEQ/L
CO2: 16 MEQ/L (ref 23–33)
CO2: 16 MEQ/L (ref 23–33)
CO2: 19 MEQ/L (ref 23–33)
CO2: 9 MEQ/L (ref 23–33)
CREAT SERPL-MCNC: 13.1 MG/DL (ref 0.4–1.2)
CREAT SERPL-MCNC: 20.1 MG/DL (ref 0.4–1.2)
CREAT SERPL-MCNC: 42.1 MG/DL (ref 0.4–1.2)
CREAT SERPL-MCNC: 6.6 MG/DL (ref 0.4–1.2)
CREATININE URINE: 180.5 MG/DL
EKG ATRIAL RATE: 125 BPM
EKG ATRIAL RATE: 131 BPM
EKG P AXIS: 47 DEGREES
EKG P AXIS: 63 DEGREES
EKG P-R INTERVAL: 152 MS
EKG P-R INTERVAL: 160 MS
EKG Q-T INTERVAL: 298 MS
EKG Q-T INTERVAL: 304 MS
EKG QRS DURATION: 100 MS
EKG QRS DURATION: 94 MS
EKG QTC CALCULATION (BAZETT): 438 MS
EKG QTC CALCULATION (BAZETT): 440 MS
EKG R AXIS: 68 DEGREES
EKG R AXIS: 81 DEGREES
EKG T AXIS: 36 DEGREES
EKG T AXIS: 44 DEGREES
EKG VENTRICULAR RATE: 125 BPM
EKG VENTRICULAR RATE: 131 BPM
EOSINOPHIL # BLD: 0.2 %
EOSINOPHILS ABSOLUTE: 0 THOU/MM3 (ref 0–0.4)
ERYTHROCYTE [DISTWIDTH] IN BLOOD BY AUTOMATED COUNT: 14.6 % (ref 11.5–14.5)
ERYTHROCYTE [DISTWIDTH] IN BLOOD BY AUTOMATED COUNT: 50 FL (ref 35–45)
GFR SERPL CREATININE-BSD FRML MDRD: 1 ML/MIN/1.73M2
GLUCOSE BLD-MCNC: 100 MG/DL (ref 70–108)
GLUCOSE BLD-MCNC: 122 MG/DL (ref 70–108)
GLUCOSE BLD-MCNC: 144 MG/DL (ref 70–108)
GLUCOSE BLD-MCNC: 232 MG/DL (ref 70–108)
GLUCOSE BLD-MCNC: 246 MG/DL (ref 70–108)
GLUCOSE BLD-MCNC: 264 MG/DL (ref 70–108)
GLUCOSE BLD-MCNC: 287 MG/DL (ref 70–108)
GLUCOSE BLD-MCNC: 99 MG/DL (ref 70–108)
HBA1C MFR BLD: 6.1 % (ref 4.4–6.4)
HCT VFR BLD CALC: 40.1 % (ref 42–52)
HEMOGLOBIN: 13.2 GM/DL (ref 14–18)
IMMATURE GRANS (ABS): 0.01 THOU/MM3 (ref 0–0.07)
IMMATURE GRANULOCYTES: 0.2 %
LYMPHOCYTES # BLD: 9.7 %
LYMPHOCYTES ABSOLUTE: 0.6 THOU/MM3 (ref 1–4.8)
MAGNESIUM: 2.4 MG/DL (ref 1.6–2.4)
MAGNESIUM: 2.5 MG/DL (ref 1.6–2.4)
MCH RBC QN AUTO: 30.6 PG (ref 26–33)
MCHC RBC AUTO-ENTMCNC: 32.9 GM/DL (ref 32.2–35.5)
MCV RBC AUTO: 93 FL (ref 80–94)
MONOCYTES # BLD: 4.5 %
MONOCYTES ABSOLUTE: 0.3 THOU/MM3 (ref 0.4–1.3)
MRSA SCREEN RT-PCR: NEGATIVE
NUCLEATED RED BLOOD CELLS: 0 /100 WBC
PLATELET # BLD: 151 THOU/MM3 (ref 130–400)
PMV BLD AUTO: 10.1 FL (ref 9.4–12.4)
POTASSIUM SERPL-SCNC: 3.7 MEQ/L (ref 3.5–5.2)
POTASSIUM SERPL-SCNC: 4 MEQ/L (ref 3.5–5.2)
POTASSIUM SERPL-SCNC: 4.2 MEQ/L (ref 3.5–5.2)
POTASSIUM SERPL-SCNC: 5.9 MEQ/L (ref 3.5–5.2)
POTASSIUM, URINE: 30.2 MEQ/L
PROSTATE SPECIFIC ANTIGEN: 24.02 NG/ML (ref 0–1)
PROTEIN, URINE: 442.1 MG/DL
RBC # BLD: 4.31 MILL/MM3 (ref 4.7–6.1)
SEG NEUTROPHILS: 85.2 %
SEGMENTED NEUTROPHILS ABSOLUTE COUNT: 4.9 THOU/MM3 (ref 1.8–7.7)
SODIUM BLD-SCNC: 152 MEQ/L (ref 135–145)
SODIUM BLD-SCNC: 153 MEQ/L (ref 135–145)
SODIUM BLD-SCNC: 157 MEQ/L (ref 135–145)
SODIUM BLD-SCNC: 157 MEQ/L (ref 135–145)
SODIUM URINE: 73 MEQ/L
T4 FREE: 0.97 NG/DL (ref 0.93–1.76)
TSH SERPL DL<=0.05 MIU/L-ACNC: 0.34 UIU/ML (ref 0.4–4.2)
VANCOMYCIN RESISTANT ENTEROCOCCUS: NEGATIVE
WBC # BLD: 5.8 THOU/MM3 (ref 4.8–10.8)

## 2020-09-26 PROCEDURE — 84153 ASSAY OF PSA TOTAL: CPT

## 2020-09-26 PROCEDURE — 2000000000 HC ICU R&B

## 2020-09-26 PROCEDURE — 93010 ELECTROCARDIOGRAM REPORT: CPT | Performed by: INTERNAL MEDICINE

## 2020-09-26 PROCEDURE — BT131ZZ FLUOROSCOPY OF BILATERAL KIDNEYS USING LOW OSMOLAR CONTRAST: ICD-10-PCS | Performed by: RADIOLOGY

## 2020-09-26 PROCEDURE — 36415 COLL VENOUS BLD VENIPUNCTURE: CPT

## 2020-09-26 PROCEDURE — 6370000000 HC RX 637 (ALT 250 FOR IP)

## 2020-09-26 PROCEDURE — 99291 CRITICAL CARE FIRST HOUR: CPT | Performed by: INTERNAL MEDICINE

## 2020-09-26 PROCEDURE — 6370000000 HC RX 637 (ALT 250 FOR IP): Performed by: NURSE PRACTITIONER

## 2020-09-26 PROCEDURE — 2580000003 HC RX 258: Performed by: INTERNAL MEDICINE

## 2020-09-26 PROCEDURE — 84439 ASSAY OF FREE THYROXINE: CPT

## 2020-09-26 PROCEDURE — 2500000003 HC RX 250 WO HCPCS: Performed by: INTERNAL MEDICINE

## 2020-09-26 PROCEDURE — 2500000003 HC RX 250 WO HCPCS: Performed by: NURSE PRACTITIONER

## 2020-09-26 PROCEDURE — 82948 REAGENT STRIP/BLOOD GLUCOSE: CPT

## 2020-09-26 PROCEDURE — 84156 ASSAY OF PROTEIN URINE: CPT

## 2020-09-26 PROCEDURE — 84300 ASSAY OF URINE SODIUM: CPT

## 2020-09-26 PROCEDURE — 76770 US EXAM ABDO BACK WALL COMP: CPT

## 2020-09-26 PROCEDURE — 80048 BASIC METABOLIC PNL TOTAL CA: CPT

## 2020-09-26 PROCEDURE — 84443 ASSAY THYROID STIM HORMONE: CPT

## 2020-09-26 PROCEDURE — 74018 RADEX ABDOMEN 1 VIEW: CPT

## 2020-09-26 PROCEDURE — 84133 ASSAY OF URINE POTASSIUM: CPT

## 2020-09-26 PROCEDURE — 99222 1ST HOSP IP/OBS MODERATE 55: CPT | Performed by: INTERNAL MEDICINE

## 2020-09-26 PROCEDURE — 82436 ASSAY OF URINE CHLORIDE: CPT

## 2020-09-26 PROCEDURE — 85025 COMPLETE CBC W/AUTO DIFF WBC: CPT

## 2020-09-26 PROCEDURE — 2580000003 HC RX 258: Performed by: NURSE PRACTITIONER

## 2020-09-26 PROCEDURE — 82570 ASSAY OF URINE CREATININE: CPT

## 2020-09-26 PROCEDURE — 6360000002 HC RX W HCPCS: Performed by: NURSE PRACTITIONER

## 2020-09-26 PROCEDURE — 83735 ASSAY OF MAGNESIUM: CPT

## 2020-09-26 PROCEDURE — 83036 HEMOGLOBIN GLYCOSYLATED A1C: CPT

## 2020-09-26 RX ORDER — ACETAMINOPHEN 325 MG/1
650 TABLET ORAL EVERY 6 HOURS PRN
Status: DISCONTINUED | OUTPATIENT
Start: 2020-09-26 | End: 2020-10-03 | Stop reason: HOSPADM

## 2020-09-26 RX ORDER — HEPARIN SODIUM 5000 [USP'U]/ML
5000 INJECTION, SOLUTION INTRAVENOUS; SUBCUTANEOUS EVERY 8 HOURS SCHEDULED
Status: DISCONTINUED | OUTPATIENT
Start: 2020-09-26 | End: 2020-10-03 | Stop reason: HOSPADM

## 2020-09-26 RX ORDER — METOPROLOL TARTRATE 5 MG/5ML
5 INJECTION INTRAVENOUS ONCE
Status: COMPLETED | OUTPATIENT
Start: 2020-09-26 | End: 2020-09-26

## 2020-09-26 RX ORDER — ONDANSETRON 2 MG/ML
4 INJECTION INTRAMUSCULAR; INTRAVENOUS EVERY 6 HOURS PRN
Status: DISCONTINUED | OUTPATIENT
Start: 2020-09-26 | End: 2020-10-03 | Stop reason: HOSPADM

## 2020-09-26 RX ORDER — SODIUM CHLORIDE 0.9 % (FLUSH) 0.9 %
10 SYRINGE (ML) INJECTION PRN
Status: DISCONTINUED | OUTPATIENT
Start: 2020-09-26 | End: 2020-10-03 | Stop reason: HOSPADM

## 2020-09-26 RX ORDER — PROMETHAZINE HYDROCHLORIDE 25 MG/1
12.5 TABLET ORAL EVERY 6 HOURS PRN
Status: DISCONTINUED | OUTPATIENT
Start: 2020-09-26 | End: 2020-10-03 | Stop reason: HOSPADM

## 2020-09-26 RX ORDER — ACETAMINOPHEN 650 MG/1
650 SUPPOSITORY RECTAL EVERY 6 HOURS PRN
Status: DISCONTINUED | OUTPATIENT
Start: 2020-09-26 | End: 2020-10-03 | Stop reason: HOSPADM

## 2020-09-26 RX ORDER — SODIUM CHLORIDE 450 MG/100ML
INJECTION, SOLUTION INTRAVENOUS CONTINUOUS
Status: DISCONTINUED | OUTPATIENT
Start: 2020-09-26 | End: 2020-09-26

## 2020-09-26 RX ORDER — ACETAMINOPHEN 325 MG/1
TABLET ORAL
Status: COMPLETED
Start: 2020-09-26 | End: 2020-09-26

## 2020-09-26 RX ORDER — SODIUM CHLORIDE 0.9 % (FLUSH) 0.9 %
10 SYRINGE (ML) INJECTION EVERY 12 HOURS SCHEDULED
Status: DISCONTINUED | OUTPATIENT
Start: 2020-09-26 | End: 2020-10-03 | Stop reason: HOSPADM

## 2020-09-26 RX ORDER — POLYETHYLENE GLYCOL 3350 17 G/17G
17 POWDER, FOR SOLUTION ORAL DAILY
Status: DISCONTINUED | OUTPATIENT
Start: 2020-09-26 | End: 2020-10-03 | Stop reason: HOSPADM

## 2020-09-26 RX ADMIN — SODIUM CHLORIDE, PRESERVATIVE FREE 10 ML: 5 INJECTION INTRAVENOUS at 20:44

## 2020-09-26 RX ADMIN — SODIUM BICARBONATE: 84 INJECTION, SOLUTION INTRAVENOUS at 18:26

## 2020-09-26 RX ADMIN — ATORVASTATIN CALCIUM 20 MG: 20 TABLET, FILM COATED ORAL at 20:44

## 2020-09-26 RX ADMIN — SODIUM BICARBONATE: 84 INJECTION, SOLUTION INTRAVENOUS at 09:36

## 2020-09-26 RX ADMIN — THERA TABS 1 TABLET: TAB at 09:43

## 2020-09-26 RX ADMIN — HEPARIN SODIUM 5000 UNITS: 5000 INJECTION INTRAVENOUS; SUBCUTANEOUS at 17:54

## 2020-09-26 RX ADMIN — FAMOTIDINE 20 MG: 10 INJECTION INTRAVENOUS at 09:48

## 2020-09-26 RX ADMIN — ACETAMINOPHEN 650 MG: 325 TABLET ORAL at 02:14

## 2020-09-26 RX ADMIN — METOPROLOL TARTRATE 5 MG: 5 INJECTION, SOLUTION INTRAVENOUS at 01:45

## 2020-09-26 RX ADMIN — SODIUM CHLORIDE: 9 INJECTION, SOLUTION INTRAVENOUS at 00:31

## 2020-09-26 RX ADMIN — HEPARIN SODIUM 5000 UNITS: 5000 INJECTION INTRAVENOUS; SUBCUTANEOUS at 09:43

## 2020-09-26 RX ADMIN — SODIUM CHLORIDE: 4.5 INJECTION, SOLUTION INTRAVENOUS at 01:23

## 2020-09-26 RX ADMIN — POLYETHYLENE GLYCOL 3350 17 G: 17 POWDER, FOR SOLUTION ORAL at 11:40

## 2020-09-26 RX ADMIN — SODIUM CHLORIDE, PRESERVATIVE FREE 10 ML: 5 INJECTION INTRAVENOUS at 09:51

## 2020-09-26 RX ADMIN — AMLODIPINE BESYLATE 5 MG: 5 TABLET ORAL at 09:43

## 2020-09-26 RX ADMIN — INSULIN LISPRO 1 UNITS: 100 INJECTION, SOLUTION INTRAVENOUS; SUBCUTANEOUS at 20:44

## 2020-09-26 ASSESSMENT — PAIN SCALES - GENERAL
PAINLEVEL_OUTOF10: 0
PAINLEVEL_OUTOF10: 0
PAINLEVEL_OUTOF10: 3
PAINLEVEL_OUTOF10: 0

## 2020-09-26 NOTE — ED NOTES
ED to inpatient nurses report    Chief Complaint   Patient presents with    Shortness of Breath      Present to ED from home   LOC: alert and orientated to name, place, date  Vital signs   Vitals:    09/25/20 2112 09/25/20 2145 09/25/20 2156 09/25/20 2209   BP: (!) 169/97 125/85  (!) 177/125   Pulse: 123 111  127   Resp: 20 20 20 20   Temp:       TempSrc:       SpO2: 98% 97% 98% 100%   Weight:       Height:          Oxygen Baseline 98% on room air     Current needs required N/A  LDAs:   Peripheral IV 09/25/20 Right Antecubital (Active)   Site Assessment Clean;Dry; Intact 09/25/20 2209   Line Status Normal saline locked 09/25/20 2209   Dressing Status Clean; Intact;Dry 09/25/20 2209     Mobility: Requires assistance * 1  Pending ED orders: N/A  Present condition: VSS, pt draining large amounts of urine (see notes), pt states he is feeling better after excreting the pressure. Pt respirations easy and unlabored.      Electronically signed by Svetlana Moody RN on 9/25/2020 at 10:12 PM     Svetlana Moody RN  09/25/20 [id number]

## 2020-09-26 NOTE — H&P
Formulation and discussion of sedation / procedure plans, risks, benefits, side effects and alternatives with patient and/or responsible adult completed.     Electronically signed by Nikkie Morrow MD on 9/25/2020 at 10:55 PM

## 2020-09-26 NOTE — ED PROVIDER NOTES
Transfer of Care Note:   I have personally performed a face to face diagnostic evaluation on this patient. I have personally performed a face to face diagnostic evaluation on this patient. The patient's initial evaluation and plan have been discussed with the prior provider who initially evaluated the patient. Nursing Notes, Past Medical Hx, Past Surgical Hx, Social Hx, Allergies, and Family Hx were all reviewed. (Please note that portions of this note were completed with a voice recognition program.  Efforts were made to edit the dictations but occasionally words are mis-transcribed.)    9:23 PM EDT: The patient was evaluated. Ariella Reyes is a 62 y.o. male who presents to the Emergency Department for the evaluation of fatigue. Patient has had progressive fatigue for the past 2 weeks sleeping more than normal.  He has seen his PCP for this and had normal labs. He was diagnosed with depression and told to get out more. He also has had progressive dyspnea on exertion. He endorses urinary frequency however going very small amounts. Due to the fatigue he has not been getting out of bed and eating and drinking as much, thusly he is now experiencing a decrease in urination. He endorses postural lightheadedness. He has not taken his blood pressure or high cholesterol medicines for the past 2 to 4 days. No other complaints. Exam:  Vital signs reviewed. Constitutional: Well-nourished, no distress evident. HEENT: Normocephalic, normal hearing, EOMI, speech clear. Neck: Soft supple. Trachea midline. Heart: Regular rate and rhythm. Lungs: Respiratory effort normal; CTAB  Abdomen: Distention; soft, nontender. Palpable bladder. Extremities: Well-perfused, movement normal as observed. Neuro: No focal deficits noted. Psych: Normal affect and behavior. EKG:  All EKG's are interpreted by the Emergency Department Physician who either signs or Co-signs this chart in the absence of a within normal limits   BASIC METABOLIC PANEL W/ REFLEX TO MG FOR LOW K - Abnormal; Notable for the following components:    Potassium reflex Magnesium 8.1 (*)     CO2 7 (*)      (*)     CREATININE 49.5 (*)     All other components within normal limits   URINE WITH REFLEXED MICRO - Abnormal; Notable for the following components:    Ketones, Urine TRACE (*)     Blood, Urine TRACE (*)     All other components within normal limits   ANION GAP - Abnormal; Notable for the following components:    Anion Gap 31.0 (*)     All other components within normal limits   GLOMERULAR FILTRATION RATE, ESTIMATED - Abnormal; Notable for the following components:    Est, Glom Filt Rate 1 (*)     All other components within normal limits   OSMOLALITY - Abnormal; Notable for the following components:    Osmolality Calc 342.2 (*)     All other components within normal limits   RAPID INFLUENZA A/B ANTIGENS   CULTURE, BLOOD 1   CULTURE, BLOOD 2   C-REACTIVE PROTEIN   HEPATIC FUNCTION PANEL   LACTIC ACID, PLASMA   TROPONIN   TSH WITHOUT REFLEX   COVID-19   POCT GLUCOSE     CONSULTS:   2153: Minesh Gracia who advised medical treatment first with Kayexalate, dextrose, insulin, calcium gluconate and repeat potassium. Place hemodialysis catheter    2145, 2205: Consulted Dr. Christian Garcia in regards to admission. Advised admission to ICU    2216: Jung Concepcion NP in regards to ICU admission    MDM:  The patient was turned over to me by Arleen Xie NP pending repeat labs and final disposition of admission. The patient had multiple abnormal values including a BUN of 165, creatinine of 49.5, and a potassium of 8.1. He was found to be in sinus tachycardia at 125 bpm with no peak T waves. Patient was noted to have abdominal distention and enlarged palpable bladder. Catheter was placed returning and immediate 1700 mL's of urine.   The patient was medicated with normal saline, D50, insulin, Kayexalate, and calcium gluconate. Consults were made on patient's behalf as noted above. Ultimately he was admitted to the ICU. Patient and son were aware of all results and amenable to admission. The patient was admitted to the hospital in fair condition under Rogelio Castro NP.    CRITICAL CARE:  During my workup of this patient, it did become clear to me the critical nature of this patient's illness which did require my constant attention, and a critical care time 60 minutes was given    FINAL IMPRESSION      1. SCOTTIE (acute kidney injury) ([address]    2. Urinary retention    3. Hyperkalemia    4. Fatigue, unspecified type        Care of this patient was transferred from Adis Bruce NP to myself at shift change.     (Please note that portions of this note were completed with a voice recognition program.  Efforts were made to edit the dictations but occasionally words are mis-transcribed.)    Kristofer Chris PA-C 9/25/20 2:28 AM       Kristofer Chris PA-C  09/28/20 3620

## 2020-09-26 NOTE — PROGRESS NOTES
Spoke with Dr. Jazmine Poole from . He will come place a temporary dialysis catheter for the patient.     Sammie Stevenson PA-C

## 2020-09-26 NOTE — ED NOTES
EKG completed, COVID swab sent. Pt laying on cot and denies any pain. Pt respirations easy and unlabored. Will monitor.       Justin Lentz RN  09/25/20 2005

## 2020-09-26 NOTE — PROGRESS NOTES
Intensive Care Unit  Medical Intensive Care Unit  Attending Progress Note      Patient was seen and evaluated with Dr. Florencia Herr. Team members present on rounds:  Nursing and Patient    ICU guidelines/prophylaxis active for the following:    head above bed above 30 degrees, insulin guidelines, DVT prophylaxis, ulcer prophylaxis and analgesia/sedation guidelines    Patient Examined? yes    Additions/differences/highlights to the resident's/fellow's exam:  VITALS:  /77   Pulse 132   Temp 97.5 °F (36.4 °C) (Oral)   Resp 14   Ht 6' 2\" (1.88 m)   Wt 259 lb 0.7 oz (117.5 kg)   SpO2 98%   BMI 33.26 kg/m²   24HR INTAKE/OUTPUT:    Intake/Output Summary (Last 24 hours) at 9/26/2020 0835  Last data filed at 9/26/2020 0631  Gross per 24 hour   Intake 1440 ml   Output 9600 ml   Net -8160 ml     VENT SETTINGS:    Vent Information  SpO2: 98 %  Additional Respiratory  Assessments  Pulse: 132  Resp: 14  SpO2: 98 %    CONSTITUTIONAL:  Acute on chronically ill-appearing  male resting in bed. HEENT:  normocephalic and atraumatic. No scleral icterus. PERR. Mucous membranes dry, coated and sticky. Bilateral exophthalmos. Neck: supple. No thyromegaly. Lungs: clear to auscultation diminished bilateral bases without wheezes/rales/rhonchi. No retractions or tachypnea. Cardiac: RRR. No JVD. Abdomen: soft. Nontender. Active bowel sounds. Extremities:  No clubbing, cyanosis, or edema x 4. Garcia catheter with hematuria. Vasculature: capillary refill < 3 seconds. Palpable dorsalis pedis pulses, 2+. Skin:  normal for ethnicity, warm, and dry. Psych:  Alert and oriented x3. Affect appropriate. Pressured speech. Lymph:  No supraclavicular adenopathy. Neurologic:  No focal deficit. No seizures.   DATA:  CBC:   Lab Results   Component Value Date    WBC 5.8 09/26/2020    RBC 4.31 09/26/2020    HGB 13.2 09/26/2020    HCT 40.1 09/26/2020    MCV 93.0 09/26/2020     09/26/2020 CMP:    Lab Results   Component Value Date     09/26/2020    K 3.7 09/26/2020    K 8.1 09/25/2020     09/26/2020    CO2 16 09/26/2020     09/26/2020    CREATININE 20.1 09/26/2020    LABGLOM 3 09/26/2020    GLUCOSE 144 09/26/2020    PROT 6.7 09/25/2020    CALCIUM 9.5 09/26/2020    BILITOT 0.4 09/25/2020    ALKPHOS 88 09/25/2020    AST 30 09/25/2020    ALT 51 09/25/2020       KEY ISSUES/FINDINGS/ASSESSMENT AND PLAN:    17-year-old -American male, never smoker, past medical history hypertension, hyperlipidemia, impaired fasting glucose, elevated PSA, anxiety, prostatitis, BPH who presented to [facility] emergency department on 9/25/2020 reporting progressive fatigue, sleeping too much, and dyspnea ongoing for two weeks. Patient did see his PCP on 9/22/2020 and was diagnosed with anxiety/depression. He has not been able to work due to fatigue. He works at [employer]. Patient also had urinary frequency with decreased amount and then decreased urine output altogether. He noticed his pants were getting tighter. The patient has not been taking his prescribed medications. Garcia catheter was placed in the emergency department and return of 1700 mL's of urine shortly and then per notes other 2200 mL's in the emergency department which was initially yellow than blood tinged. Patient was taken to Interventional Radiology for placement of temporary hemodialysis catheter per Dr. Nik Cheema and subsequently admitted to the Intensive Care unit for further monitoring.     Upon evaluation patient reports headache, mild/frontal.  He denies nausea/vomiting but reports some constipation and loose stools. Denies fever/chills, muscle aches or cramping, and paresthesia. Reports decreased appetite and oral intake. Assessment and Plan:-  1. Acute Urinary Retention:  S/P Garcia catheter placed. Hold and avoid any anticholinergic medications. Consult to Urology. Strict hourly intake and output.     2. Acute Renal Failure with Associated Hyperkalemia:  secondary to #1. S/P calcium, dextrose, insulin, and albuterol in emergency department. No EKG changes. Renal Ultrasound. Consult to Nephrology. Trend BMP. 3. Post-Obstructive Diuresis: 3900 ml output initially. Hourly Intake and output. Volume replacement. 4. Hematuria:  secondary to #1. Consult to Urology. Follow urine culture. 5. History BPH/Prostatitis:  Consult to Urology as above. 6. Accelerated Hypertension:  IVF. Lopressor IV. Restart Norvasc. 7. High Anion Gap Metabolic Acidosis:  Plan per number 2. Trend BMP. 8. Normocytic Anemia:  Hemoglobin 12.7; baseline Hg appx. 15.  Dilutional effect. Monitor hematuria and trend CBC. Continue multivitamin. 9. BPH/History Prostatitis:  Not on any alpha-1-adrernergic antagonists outpatient. Consult to Urology as above. Constipation:  KUB. Stool softeners.         Critical condition with guarded prognosis  Cc time 31 min apart from procedures

## 2020-09-26 NOTE — PROGRESS NOTES
2249  Patient received in IR for HD temp. cath placement. 2251 This procedure has been fully reviewed with the patient and written informed consent has been obtained. 2259 Procedure started with Dr. Paddy Ott. 2300 Access into the right internal jugular. 1105 14Fx 20cm SLX Hemo-Cath Double Lumen placed into the right internal jugular. LOT [id number], exp. 12/20/2022  1106 Procedure completed; patient tolerated well. Suturing in the HD temp cath with 2-0 silk; no bleeding noted. 1107 Biopatch placed at the site and sterile tegaderm to secure. Pt. has a aponte catheter in draining bloody urine and he is complaining of penile pain rated a 2 on scale 1-10.   1112 Patient on cart; comfort ensured. 1117 Patient taken to ICU via cart on the monitor with ER nurse and ICU nurse. Son is at his side.

## 2020-09-26 NOTE — ED NOTES
IR called and ready for transportation to ICU. ICU called for transport to the floor.       Alfa Locke RN  09/25/20 9130

## 2020-09-26 NOTE — PROGRESS NOTES
56 - Dr. Marquez Short contacted regarding BMP results    8402- Dr. Marquez Short returned my call. Discussed BMP, intake/ output, and patient status. Fluids changed to 0.45% NS    0123 - Dr. Gaby Carlos contacted for urology consult. No response at this time    0215- Dr. Jay Barnett with Renal US results, states there is nothing acute. Patient hallucinating writing on plain white styrofoam cup. Results/ hallucinations reported to Nikki Oseguera, [address] [place] [address]  Dr. Gaby Carlos contacted again to confirm he received the consult. He stated he will see patient in morning.

## 2020-09-26 NOTE — CONSULTS
Nephrology Consult Note  Patient's Name: Krystian Pope  11:23 AM  9/26/2020    Nephrologist: Clay Guidry    Reason for Consult: Acute kidney injury. Seizure metabolic acidosis. Hyperkalemia. Requesting Physician:  Jonathon Siemens, MD  PCP: Simón Roldan DO    Chief Complaint: My blood pressure is high  Assessment  1. Acute kidney injury of unclear etiology but suspect outlet obstruction. 2. Severe hyperkalemia resolved  3. Severe metabolic acidosis much improved  4. Hypernatremia may be due to volume contraction  5. Normocytic anemia may be dilutional  6. Hypertension stable  7. BPH? Plan    1. I discussed my thoughts at length with the patient. 2. He understood. 3. I addressed his many questions. 4. Lab result discussed and reviewed with him. 5. Baseline kidney ultrasound. 6. PSA. 7. Random urine protein creatinine ratio. 8. Depending on the results may pursue further evaluation to include  kappa with lambda free light chain assay ratio among other things  9. If  kidney ultrasound is normal we will check  renal flow scan. 10. Change intravenous fluid to D5W with 100 mEq sodium bicarbonate at 150 mils per hour. Will need to make the fluid hypotonic due to hypernatremia. 11. Continue current lab orders. 12. Will follow. History Obtained From: Patient, staff and medical record  History of Present Ilness:    Krystian Pope is a 62 y.o. male with a history of hypertension, dyslipidemia, elevated PSA admitted through the emergency department when the patient presented there with shortness of breath. He also has had fatigue and decreased urine output. He thinks he has gained some weight due to his pants getting tighter than usual for him. He was evaluated for these symptoms by the primary care provider who made diagnosis of anxiety with possible depression. In the emergency department he was found to have a profoundly elevated serum creatinine of 50 mg/dL with BUN of 173 mg/dL.   Serum bicarbonate was 6 mEq/L. Serum potassium was 8.1 mEq/L. As a result I was asked to see him. In the emergency department, Garcia catheter was placed with return of  7000 mL of urine. He received  Kayexalate, dextrose, insulin and potassium intravenously in the emergency department. We believe the serum potassium and other labs will improve with the above measures. As a result, emergent hemodialysis treatment was not done last night. Indeed they have all improved. His baseline serum creatinine has ranged between 1.0 to 1.3 mg/dL going back to year 2013 when the serum creatinine was 1.3 mg/dL. No chest pain. No nausea vomiting. No fever chills. No abdominal pain. No headaches. He is followed by urology for elevated PSA. He also thinks he may have  BPH. Past Medical History:   Diagnosis Date    Acute kidney injury ([address] 9/25/2020    Elevated PSA     Hyperlipidemia LDL goal < 100 2/26/2013    Hypertension        Past Surgical History:   Procedure Laterality Date    ACHILLES TENDON SURGERY      CORNEAL TRANSPLANT  7/2013    EYE SURGERY  2014    \"re-work on corneal trasplant\"    MO EGD FLEXIBLE FOREIGN BODY REMOVAL Left 1/18/2018    EGD FOREIGN BODY REMOVAL performed by Audie Sainz MD at [place] [address]  2-4-2014    bx- negative       Family History   Problem Relation Age of Onset    High Blood Pressure Father     Colon Cancer Neg Hx     Cancer Neg Hx         reports that he has never smoked. He has never used smokeless tobacco. He reports current alcohol use. He reports that he does not use drugs.     Allergies:  Lisinopril and Shellfish-derived products    Current Medications:    sodium chloride flush 0.9 % injection 10 mL, 2 times per day  sodium chloride flush 0.9 % injection 10 mL, PRN  acetaminophen (TYLENOL) tablet 650 mg, Q6H PRN    Or  acetaminophen (TYLENOL) suppository 650 mg, Q6H PRN  polyethylene glycol (GLYCOLAX) packet 17 g, Daily  promethazine (PHENERGAN) tablet 12.5 mg, Q6H PRN    Or  ondansetron (ZOFRAN) injection 4 mg, Q6H PRN  heparin (porcine) injection 5,000 Units, 3 times per day  famotidine (PEPCID) injection 20 mg, Daily  potassium replacement protocol, RX Placeholder  sodium bicarbonate 100 mEq in dextrose 5 % 1,000 mL infusion, Continuous  glucose (GLUTOSE) 40 % oral gel 15 g, PRN  dextrose 50 % IV solution, PRN  glucagon (rDNA) injection 1 mg, PRN  dextrose 5 % solution, PRN  amLODIPine (NORVASC) tablet 5 mg, Daily  aspirin EC tablet 81 mg, Daily  atorvastatin (LIPITOR) tablet 20 mg, Nightly  multivitamin 1 tablet, Daily  insulin lispro (HUMALOG) injection vial 0-6 Units, TID WC  insulin lispro (HUMALOG) injection vial 0-3 Units, Nightly  [Held by provider] opium-belladonna (B&O SUPPRETTES) 16.2-30 MG suppository 30 mg, Q8H PRN        Review of Systems:   Pertinent positives stated above in HPI. All other systems were reviewed and were negative. ROS:Constitutional: positive for fatigue and malaise  Eyes: negative  Ears, nose, mouth, throat, and face: negative  Respiratory: positive for dyspnea on exertion  Cardiovascular: negative  Gastrointestinal: negative  Genitourinary:negative  Integument/breast: negative  Hematologic/lymphatic: negative  Musculoskeletal:negative  Neurological: negative  Behavioral/Psych: negative  Endocrine: negative  Allergic/Immunologic: negative    Physical exam:   Constitutional: Well-developed middle-aged gentleman in no distress. Vitals:   Vitals:    09/26/20 0800   BP:    Pulse:    Resp:    Temp: 98.4 °F (36.9 °C)   SpO2:        Skin: no rash, turgor is normal  Heent: Pupils are reactive to light. Throat is clear.   Oral mucosa is moist.  Neck: no bruits or jvd noted   Cardiovascular:  S1, S2 without murmur   Respiratory: Clear with no wheezes,rhonchi or rales   Abdomen: soft,non tender,good bowel sound and no palpable mass  Ext: No lower extremity edema  Psychiatric: mood and affect appropriate  Musculoskeletal:  Rom, muscular strength intact   CNS: Very awake and very alert. Well-oriented. Normal speech. Good motor strength. No obvious focal deficit.     Data:   Labs:  Lab Results   Component Value Date     (H) 09/26/2020     (H) 09/26/2020     (H) 09/25/2020    K 4.0 09/26/2020    K 3.7 09/26/2020    K 5.9 (H) 09/25/2020    K 5.9 (H) 09/25/2020     (H) 09/26/2020    CO2 16 (L) 09/26/2020    CO2 16 (L) 09/26/2020    CO2 9 (LL) 09/25/2020    CREATININE 13.1 (HH) 09/26/2020    CREATININE 20.1 (HH) 09/26/2020    CREATININE 42.1 (HH) 09/25/2020    BUN 77 (H) 09/26/2020     (H) 09/26/2020     (H) 09/25/2020    GLUCOSE 100 09/26/2020    GLUCOSE 144 (H) 09/26/2020    GLUCOSE 122 (H) 09/25/2020    WBC 5.8 09/26/2020    WBC 9.4 09/25/2020    WBC 7.8 07/30/2020    HGB 13.2 (L) 09/26/2020    HGB 12.7 (L) 09/25/2020    HGB 15.5 07/30/2020    HCT 40.1 (L) 09/26/2020    HCT 39.4 (L) 09/25/2020    HCT 47.8 07/30/2020    MCV 93.0 09/26/2020     09/26/2020     {Labs reviewed    Imaging:  CXR results: Diagnostic images reports reviewed  @ studies        Thank you Dr. Tess Fragoso DO for allowing us to participate in care of Patti Cordero       Electronically signed by Mauri Pelayo MD on 9/26/2020 at 11:23 AM

## 2020-09-26 NOTE — ED NOTES
Pt updated on POC. Pt respirations easy and unlabored. Will continue to monitor.       Karl Laguerre RN  09/25/20 2743

## 2020-09-26 NOTE — ED NOTES
1,500 ML initially removed from bladder upon placement of urinary catheter. Urine was yellow in color.  200 mL then removed of bright red urine after initial removal.      Liset Peterson RN  09/25/20 1715

## 2020-09-26 NOTE — H&P
CRITICAL CARE- History & Physical      Patient: Patti Cordero    Unit/Bed:[facility]  YOB: 1960  MRN: [id number]   Acct: [de-identified]   PCP: Tess Fragoso DO    Date of Service: Pt seen/examined on 09/25/20  and Admitted to Inpatient with expected LOS greater than two midnights due to medical therapy. Chief Complaint: Shortness of breath. Assessment and Plan:-  1. Acute Urinary Retention:  S/P Garcia catheter placed. Hold and avoid any anticholinergic medications. Consult to Urology. Strict hourly intake and output. 2. Acute Renal Failure with Associated Hyperkalemia:  secondary to #1. S/P calcium, dextrose, insulin, and albuterol in emergency department. No EKG changes. Renal Ultrasound. Consult to Nephrology. Trend BMP. 3. Post-Obstructive Diuresis: 3900 ml output initially. Hourly Intake and output. Volume replacement. 4. Hematuria:  secondary to #1. Consult to Urology. Follow urine culture. 5. History BPH/Prostatitis:  Consult to Urology as above. 6. Accelerated Hypertension:  IVF. Lopressor IV. Restart Norvasc. 7. High Anion Gap Metabolic Acidosis:  Plan per number 2. Trend BMP. 8. Normocytic Anemia:  Hemoglobin 12.7; baseline Hg appx. 15.  Dilutional effect. Monitor hematuria and trend CBC. Continue multivitamin. 9. BPH/History Prostatitis:  Not on any alpha-1-adrernergic antagonists outpatient. Consult to Urology as above. 10. Constipation:  KUB. Stool softeners. History Of Present Illness:      76-year-old -American male, never smoker, past medical history hypertension, hyperlipidemia, impaired fasting glucose, elevated PSA, anxiety, prostatitis, BPH who [facility] reporting progressive fatigue, sleeping too much, and dyspnea ongoing for two weeks. Patient did see his PCP on 9/22/2020 and was diagnosed with anxiety/depression. He has not been able to work due to fatigue. and Shellfish-derived products    Social History:    reports that he has never smoked. He has never used smokeless tobacco. He reports current alcohol use. He reports that he does not use drugs. Family History:       Problem Relation Age of Onset    High Blood Pressure Father     Colon Cancer Neg Hx     Cancer Neg Hx        Diet:  No diet orders on file    Review of systems:   Pertinent positives as noted in the HPI. All other systems reviewed and negative. PHYSICAL EXAMINATION:  Vital Signs:  Temperature 97.5, pulse 126, resp rate 12, blood pressure 150/118, pulse ox 100% on room air. Intake:  500 mL IV/Output:  Urine=3900mL   Wt Readings from Last 3 Encounters:   09/25/20 280 lb (127 kg)   08/04/20 275 lb 3.2 oz (124.8 kg)   01/29/20 275 lb 12.8 oz (125.1 kg)      Body mass index is 35.95 kg/m². CAM-ICU:   Awake and alert. General:   Acute on chronically ill-appearing  male resting in bed. HEENT:  normocephalic and atraumatic. No scleral icterus. PERR. Mucous membranes dry, coated and sticky. Bilateral exophthalmos. Neck: supple. No thyromegaly. Lungs: clear to auscultation diminished bilateral bases without wheezes/rales/rhonchi. No retractions or tachypnea. Cardiac: RRR. No JVD. Abdomen: soft. Nontender. Active bowel sounds. Extremities:  No clubbing, cyanosis, or edema x 4. Garcia catheter with hematuria. Vasculature: capillary refill < 3 seconds. Palpable dorsalis pedis pulses, 2+. Skin:  normal for ethnicity, warm, and dry. Psych:  Alert and oriented x3. Affect appropriate. Pressured speech. Lymph:  No supraclavicular adenopathy. Neurologic:  No focal deficit. No seizures. Data: (All radiographs, tracings, PFTs, and imaging are personally viewed and interpreted unless otherwise noted).     Telemetry: Sinus tachycardia    WBC 9.4, Hg 12.7, Hct 39.4, Platelet count 457   Sodium 145, potassium 8.1, chloride 107, CO2 7, , Creatinine 49.5,

## 2020-09-26 NOTE — ED NOTES
1,000 mL drained off urethral catheter. Urine is rust in color. Pt states he can feel the pressure coming off his abdomen. Pt respirations easy and unlabored. Pt medicated per order.       Asha Gillette RN  09/25/20 Rebeca Gold RN  09/25/20 [id number]

## 2020-09-26 NOTE — OP NOTE
Department of Radiology  Post Procedure Progress Note      Pre-Procedure Diagnosis:  Renal failure    Procedure Performed:  Temporary dialysis CVC placement, right IJ    Anesthesia: local     Findings: successful    Immediate Complications:  None    Estimated Blood Loss: minimal    SEE DICTATED PROCEDURE NOTE FOR COMPLETE DETAILS.     Electronically signed by Liz Olivares MD on 9/25/2020 at 11:10 PM

## 2020-09-26 NOTE — FLOWSHEET NOTE
09/26/20 1119   Encounter Summary   Services provided to: Patient   Referral/Consult From: [facility]   Continue Visiting Yes  (9/26)   Complexity of Encounter Moderate   Length of Encounter 15 minutes   Routine   Type Initial   Assessment Calm; Approachable   Intervention Nurtured hope   Outcome Comfort;Expressed gratitude   Spiritual/Hindu   Type Spiritual support   Assessment: In my encounter with the 62 yr old patient, while rounding  the ICU unit,  I provided spiritual care to patient through conversation, I also came to assess the patients spiritual needs present. The pt stated that he has to get better to spend more time with his children and his grandchildren. The pt also talked about retiring. Interventions:  I provided prayer, emotional support and words of comfort. Outcomes: The patient was encouraged and didnt share any further spiritual needs at this time. The pt remains optimistic and hopeful. The pt shared that they were appreciative for the support. Plan:  1. Chaplains will follow-up at a later time for assessment of any spiritual care needs present.         2.   The Chaplains will be available to provide further emotional support per request.

## 2020-09-27 LAB
ANION GAP SERPL CALCULATED.3IONS-SCNC: 12 MEQ/L (ref 8–16)
ANION GAP SERPL CALCULATED.3IONS-SCNC: 9 MEQ/L (ref 8–16)
BUN BLDV-MCNC: 27 MG/DL (ref 7–22)
BUN BLDV-MCNC: 35 MG/DL (ref 7–22)
CALCIUM SERPL-MCNC: 8.6 MG/DL (ref 8.5–10.5)
CALCIUM SERPL-MCNC: 8.9 MG/DL (ref 8.5–10.5)
CHLORIDE BLD-SCNC: 110 MEQ/L (ref 98–111)
CHLORIDE BLD-SCNC: 112 MEQ/L (ref 98–111)
CO2: 26 MEQ/L (ref 23–33)
CO2: 27 MEQ/L (ref 23–33)
CREAT SERPL-MCNC: 3 MG/DL (ref 0.4–1.2)
CREAT SERPL-MCNC: 4.2 MG/DL (ref 0.4–1.2)
ERYTHROCYTE [DISTWIDTH] IN BLOOD BY AUTOMATED COUNT: 14.5 % (ref 11.5–14.5)
ERYTHROCYTE [DISTWIDTH] IN BLOOD BY AUTOMATED COUNT: 49.1 FL (ref 35–45)
GLUCOSE BLD-MCNC: 139 MG/DL (ref 70–108)
GLUCOSE BLD-MCNC: 171 MG/DL (ref 70–108)
GLUCOSE BLD-MCNC: 204 MG/DL (ref 70–108)
GLUCOSE BLD-MCNC: 207 MG/DL (ref 70–108)
GLUCOSE BLD-MCNC: 209 MG/DL (ref 70–108)
GLUCOSE BLD-MCNC: 229 MG/DL (ref 70–108)
HCT VFR BLD CALC: 38.1 % (ref 42–52)
HEMOGLOBIN: 12.8 GM/DL (ref 14–18)
MCH RBC QN AUTO: 31.1 PG (ref 26–33)
MCHC RBC AUTO-ENTMCNC: 33.6 GM/DL (ref 32.2–35.5)
MCV RBC AUTO: 92.5 FL (ref 80–94)
MRSA SCREEN: NORMAL
PLATELET # BLD: 129 THOU/MM3 (ref 130–400)
PMV BLD AUTO: 10.5 FL (ref 9.4–12.4)
POTASSIUM SERPL-SCNC: 3.4 MEQ/L (ref 3.5–5.2)
POTASSIUM SERPL-SCNC: 3.4 MEQ/L (ref 3.5–5.2)
RBC # BLD: 4.12 MILL/MM3 (ref 4.7–6.1)
SODIUM BLD-SCNC: 146 MEQ/L (ref 135–145)
SODIUM BLD-SCNC: 150 MEQ/L (ref 135–145)
WBC # BLD: 8.3 THOU/MM3 (ref 4.8–10.8)

## 2020-09-27 PROCEDURE — 6370000000 HC RX 637 (ALT 250 FOR IP): Performed by: NURSE PRACTITIONER

## 2020-09-27 PROCEDURE — 82948 REAGENT STRIP/BLOOD GLUCOSE: CPT

## 2020-09-27 PROCEDURE — 99233 SBSQ HOSP IP/OBS HIGH 50: CPT | Performed by: INTERNAL MEDICINE

## 2020-09-27 PROCEDURE — 1200000003 HC TELEMETRY R&B

## 2020-09-27 PROCEDURE — 2500000003 HC RX 250 WO HCPCS: Performed by: NURSE PRACTITIONER

## 2020-09-27 PROCEDURE — 99232 SBSQ HOSP IP/OBS MODERATE 35: CPT | Performed by: INTERNAL MEDICINE

## 2020-09-27 PROCEDURE — 80048 BASIC METABOLIC PNL TOTAL CA: CPT

## 2020-09-27 PROCEDURE — 36415 COLL VENOUS BLD VENIPUNCTURE: CPT

## 2020-09-27 PROCEDURE — 94760 N-INVAS EAR/PLS OXIMETRY 1: CPT

## 2020-09-27 PROCEDURE — 2580000003 HC RX 258: Performed by: INTERNAL MEDICINE

## 2020-09-27 PROCEDURE — 6370000000 HC RX 637 (ALT 250 FOR IP): Performed by: INTERNAL MEDICINE

## 2020-09-27 PROCEDURE — 85027 COMPLETE CBC AUTOMATED: CPT

## 2020-09-27 PROCEDURE — 6360000002 HC RX W HCPCS: Performed by: NURSE PRACTITIONER

## 2020-09-27 PROCEDURE — 2580000003 HC RX 258: Performed by: NURSE PRACTITIONER

## 2020-09-27 PROCEDURE — 2500000003 HC RX 250 WO HCPCS: Performed by: INTERNAL MEDICINE

## 2020-09-27 RX ORDER — POTASSIUM CHLORIDE 20 MEQ/1
40 TABLET, EXTENDED RELEASE ORAL ONCE
Status: DISCONTINUED | OUTPATIENT
Start: 2020-09-27 | End: 2020-09-27 | Stop reason: SDUPTHER

## 2020-09-27 RX ORDER — SODIUM CHLORIDE 450 MG/100ML
INJECTION, SOLUTION INTRAVENOUS CONTINUOUS
Status: DISCONTINUED | OUTPATIENT
Start: 2020-09-27 | End: 2020-09-28

## 2020-09-27 RX ADMIN — INSULIN LISPRO 1 UNITS: 100 INJECTION, SOLUTION INTRAVENOUS; SUBCUTANEOUS at 21:34

## 2020-09-27 RX ADMIN — FAMOTIDINE 20 MG: 10 INJECTION INTRAVENOUS at 09:41

## 2020-09-27 RX ADMIN — ACETAMINOPHEN 650 MG: 325 TABLET ORAL at 21:35

## 2020-09-27 RX ADMIN — HEPARIN SODIUM 5000 UNITS: 5000 INJECTION INTRAVENOUS; SUBCUTANEOUS at 00:05

## 2020-09-27 RX ADMIN — THERA TABS 1 TABLET: TAB at 09:40

## 2020-09-27 RX ADMIN — HEPARIN SODIUM 5000 UNITS: 5000 INJECTION INTRAVENOUS; SUBCUTANEOUS at 07:16

## 2020-09-27 RX ADMIN — HEPARIN SODIUM 5000 UNITS: 5000 INJECTION INTRAVENOUS; SUBCUTANEOUS at 21:34

## 2020-09-27 RX ADMIN — SODIUM CHLORIDE, PRESERVATIVE FREE 10 ML: 5 INJECTION INTRAVENOUS at 21:35

## 2020-09-27 RX ADMIN — AMLODIPINE BESYLATE 5 MG: 5 TABLET ORAL at 12:47

## 2020-09-27 RX ADMIN — POTASSIUM BICARBONATE 40 MEQ: 782 TABLET, EFFERVESCENT ORAL at 09:40

## 2020-09-27 RX ADMIN — ASPIRIN 81 MG: 81 TABLET ORAL at 21:35

## 2020-09-27 RX ADMIN — SODIUM CHLORIDE: 4.5 INJECTION, SOLUTION INTRAVENOUS at 07:12

## 2020-09-27 RX ADMIN — SODIUM BICARBONATE: 84 INJECTION, SOLUTION INTRAVENOUS at 03:36

## 2020-09-27 RX ADMIN — ATORVASTATIN CALCIUM 20 MG: 20 TABLET, FILM COATED ORAL at 21:35

## 2020-09-27 RX ADMIN — POLYETHYLENE GLYCOL 3350 17 G: 17 POWDER, FOR SOLUTION ORAL at 09:41

## 2020-09-27 RX ADMIN — HEPARIN SODIUM 5000 UNITS: 5000 INJECTION INTRAVENOUS; SUBCUTANEOUS at 14:42

## 2020-09-27 ASSESSMENT — PAIN SCALES - GENERAL
PAINLEVEL_OUTOF10: 0
PAINLEVEL_OUTOF10: 1
PAINLEVEL_OUTOF10: 0

## 2020-09-27 NOTE — PROGRESS NOTES
Patient arrived to [facility] via wheelchair. Two nurse skin check completed by Marah Fan and Porfirio CACERES. 6K unit policies explained to patient. Call light within reach.

## 2020-09-27 NOTE — PROGRESS NOTES
[phone]*     CMP:    Recent Labs     09/26/20  1743 09/27/20  0013 09/27/20  0425   * 150* 146*   K 4.2 3.4* 3.4*   * 112* 110   CO2 19* 26 27   BUN 50* 35* 27*   CREATININE 6.6* 4.2* 3.0*   GLUCOSE 287* 229* 204*   CALCIUM 9.1 8.9 8.6   LABGLOM 10* 17* 26*     Troponin: No results for input(s): TROPONINI in the last 72 hours. BNP: No results for input(s): BNP in the last 72 hours. INR: No results for input(s): INR in the last 72 hours. Lipids:   Recent Labs     09/25/20  1850   LIPASE 71.9*     Liver:   Recent Labs     09/25/20 1850   AST 30   ALT 51   ALKPHOS 88   PROT 6.7   LABALBU 4.2   BILITOT 0.4     Iron:  No results for input(s): IRONS, FERRITIN in the last 72 hours. Invalid input(s): LABIRONS  XR ABDOMEN (KUB) (SINGLE AP VIEW)   Final Result   Nonobstructive bowel gas pattern. **This report has been created using voice recognition software. It may contain minor errors which are inherent in voice recognition technology. **      Final report electronically signed by Dr Edvin Her on 9/26/2020 9:06 AM      US RENAL COMPLETE   Final Result   1. No acute findings. No hydronephrosis. 2. Solitary benign-appearing cyst in each kidney. 3. Small Angiomyolipoma left kidney. **This report has been created using voice recognition software. It may contain minor errors which are inherent in voice recognition technology. **      Final report electronically signed by Dr. Daniel Ho on 9/26/2020 4:00 AM      IR FLUORO GUIDED CVA DEVICE PLMT/REPLACE/REMOVAL   Final Result   Status post successful nontunneled dialysis catheter insertion. **This report has been created using voice recognition software. It may contain minor errors which are inherent in voice recognition technology. **      Final report electronically signed by Dr Edvin Her on 9/26/2020 7:12 AM      XR CHEST PORTABLE   Final Result   No acute findings               **This report

## 2020-09-27 NOTE — PROGRESS NOTES
Intensive Care Unit  Medical Intensive Care Unit  Attending Progress Note      Patient was seen and evaluated with Dr. Ravi Lewis. .        Team members present on rounds:  Family, Nursing and Patient    ICU guidelines/prophylaxis active for the following:    head above bed above 30 degrees, DVT prophylaxis and ulcer prophylaxis    Patient Examined? yes    Additions/differences/highlights to the resident's/fellow's exam:  VITALS:  BP (!) 110/93   Pulse 113   Temp 98.3 °F (36.8 °C) (Oral)   Resp 13   Ht 6' 2\" (1.88 m)   Wt 269 lb 10 oz (122.3 kg)   SpO2 96%   BMI 34.62 kg/m²   24HR INTAKE/OUTPUT:    Intake/Output Summary (Last 24 hours) at 9/27/2020 1247  Last data filed at 9/27/2020 1000  Gross per 24 hour   Intake 5645.08 ml   Output 6000 ml   Net -354.92 ml     CONSTITUTIONAL:  Acute on chronically ill-appearing  male, resting on chair.    HEENT:  normocephalic and atraumatic.  No scleral icterus. PERR.  Mucous membranes dry, coated and sticky.  Bilateral exophthalmos.    Neck: supple.  No thyromegaly. Lungs: clear to auscultation diminished bilateral bases without wheezes/rales/rhonchi.  No retractions or tachypnea.    Cardiac: RRR.  No JVD. Abdomen: soft.  Nontender.  Active bowel sounds. Extremities:  No clubbing, cyanosis, or edema x 4.  Garcia catheter with hematuria.    Vasculature: capillary refill < 3 seconds. Palpable dorsalis pedis pulses, 2+. Skin:  normal for ethnicity, warm, and dry. Psych:  Alert and oriented x3.  Affect appropriate.  Pressured speech. Lymph:  No supraclavicular adenopathy. Neurologic:  No focal deficit. No seizures.   DATA:  CBC:   Lab Results   Component Value Date    WBC 8.3 09/27/2020    RBC 4.12 09/27/2020    HGB 12.8 09/27/2020    HCT 38.1 09/27/2020    MCV 92.5 09/27/2020     09/27/2020     CMP:    Lab Results   Component Value Date     09/27/2020    K 3.4 09/27/2020    K 8.1 09/25/2020     09/27/2020    CO2 27 09/27/2020 BUN 27 09/27/2020    CREATININE 3.0 09/27/2020    LABGLOM 26 09/27/2020    GLUCOSE 204 09/27/2020    PROT 6.7 09/25/2020    CALCIUM 8.6 09/27/2020    BILITOT 0.4 09/25/2020    ALKPHOS 88 09/25/2020    AST 30 09/25/2020    ALT 51 09/25/2020       KEY ISSUES/FINDINGS/ASSESSMENT AND PLAN:    68-year-old -American male, never smoker, past medical history hypertension, hyperlipidemia, impaired fasting glucose, elevated PSA, anxiety, prostatitis, BPH who presented to [facility] emergency department on 9/25/2020 reporting progressive fatigue, sleeping too much, and dyspnea ongoing for two weeks.  Patient did see his PCP on 9/22/2020 and was diagnosed with anxiety/depression. Dorcas Dee has not been able to work due to [employer] works at [employer]. Lawrence Santiagokshruti also had urinary frequency with decreased amount and then decreased urine output altogether. Dorcas Dee noticed his pants were getting tighter.  The patient has not been taking his prescribed medications.  Garcia catheter was placed in the emergency department and return of 1700 mL's of urine shortly and then per notes other 2200 mL's in the emergency department which was initially yellow than blood tinged.  Patient was taken to Interventional Radiology for placement of temporary hemodialysis catheter per Dr. Yifan Knutson and subsequently admitted to the Intensive Care unit for further monitoring.     Upon evaluation patient reports headache, mild/frontal.  He denies nausea/vomiting but reports some constipation and loose stools.  Denies fever/chills, muscle aches or cramping, and paresthesia.  Reports decreased appetite and oral intake. Assessment and Plan:-  1. Acute Urinary Retention secondary to outlet obstruction most probably from prostate enlargement:  S/P Garcia catheter placed.  Hold and avoid any anticholinergic medications.  Urology following.  Strict hourly intake and output.  PSA 24.02  2. Acute Renal Failure with Associated Hyperkalemia:  secondary to #1.   Dramatic improvement in kidney function test after Garcia catheter insertion with with urine output 17.1 L over 2 days and net intake output last 2 days was -8.8 L , urology and nephrology following. 3. Post-Obstructive Diuresis:urine output 17.1 L over 2 days and net intake output last 2 days was -8.8 L  Monitor, electrolyte & replacement, fluid replacement. 4. Hematuria:  secondary to #1.  Consult to Urology. [facility] urine culture.    5. History BPH/Prostatitis:  Consult to Urology as above.    6. Accelerated Hypertension:  IVF.  Lopressor IV.  Restart Norvasc.    7. High Anion Gap Metabolic Acidosis: resolved  8. Normocytic Anemia:  Hemoglobin 12.7; baseline Hg appx. 15.  Dilutional effect.  Monitor hematuria and trend CBC.  Continue multivitamin.     9. BPH/History Prostatitis:  Not on any alpha-1-adrernergic antagonists outpatient.  Consult to Urology as above.   Constipation:  KUB.  Stool softeners.         Critical condition with guarded prognosis  Level 3 subsequent hospital progress care note

## 2020-09-28 ENCOUNTER — APPOINTMENT (OUTPATIENT)
Dept: NUCLEAR MEDICINE | Age: 60
DRG: 683 | End: 2020-09-28
Payer: COMMERCIAL

## 2020-09-28 LAB
ANION GAP SERPL CALCULATED.3IONS-SCNC: 12 MEQ/L (ref 8–16)
ANION GAP SERPL CALCULATED.3IONS-SCNC: 12 MEQ/L (ref 8–16)
BUN BLDV-MCNC: 12 MG/DL (ref 7–22)
BUN BLDV-MCNC: 15 MG/DL (ref 7–22)
CALCIUM SERPL-MCNC: 7.5 MG/DL (ref 8.5–10.5)
CALCIUM SERPL-MCNC: 8.4 MG/DL (ref 8.5–10.5)
CHLORIDE BLD-SCNC: 104 MEQ/L (ref 98–111)
CHLORIDE BLD-SCNC: 98 MEQ/L (ref 98–111)
CO2: 25 MEQ/L (ref 23–33)
CO2: 27 MEQ/L (ref 23–33)
CREAT SERPL-MCNC: 1.7 MG/DL (ref 0.4–1.2)
CREAT SERPL-MCNC: 1.9 MG/DL (ref 0.4–1.2)
GLUCOSE BLD-MCNC: 117 MG/DL (ref 70–108)
GLUCOSE BLD-MCNC: 129 MG/DL (ref 70–108)
GLUCOSE BLD-MCNC: 140 MG/DL (ref 70–108)
GLUCOSE BLD-MCNC: 156 MG/DL (ref 70–108)
GLUCOSE BLD-MCNC: 203 MG/DL (ref 70–108)
GLUCOSE BLD-MCNC: 206 MG/DL (ref 70–108)
MAGNESIUM: 1.3 MG/DL (ref 1.6–2.4)
MAGNESIUM: 1.4 MG/DL (ref 1.6–2.4)
PHOSPHORUS: 1.9 MG/DL (ref 2.4–4.7)
POTASSIUM REFLEX MAGNESIUM: 3.1 MEQ/L (ref 3.5–5.2)
POTASSIUM REFLEX MAGNESIUM: 3.4 MEQ/L (ref 3.5–5.2)
SODIUM BLD-SCNC: 135 MEQ/L (ref 135–145)
SODIUM BLD-SCNC: 143 MEQ/L (ref 135–145)

## 2020-09-28 PROCEDURE — 2580000003 HC RX 258: Performed by: NURSE PRACTITIONER

## 2020-09-28 PROCEDURE — 2500000003 HC RX 250 WO HCPCS: Performed by: NURSE PRACTITIONER

## 2020-09-28 PROCEDURE — 82948 REAGENT STRIP/BLOOD GLUCOSE: CPT

## 2020-09-28 PROCEDURE — 84100 ASSAY OF PHOSPHORUS: CPT

## 2020-09-28 PROCEDURE — 78708 K FLOW/FUNCT IMAGE W/DRUG: CPT

## 2020-09-28 PROCEDURE — 99232 SBSQ HOSP IP/OBS MODERATE 35: CPT | Performed by: INTERNAL MEDICINE

## 2020-09-28 PROCEDURE — 6370000000 HC RX 637 (ALT 250 FOR IP): Performed by: INTERNAL MEDICINE

## 2020-09-28 PROCEDURE — 6360000002 HC RX W HCPCS

## 2020-09-28 PROCEDURE — 3430000000 HC RX DIAGNOSTIC RADIOPHARMACEUTICAL: Performed by: INTERNAL MEDICINE

## 2020-09-28 PROCEDURE — 36415 COLL VENOUS BLD VENIPUNCTURE: CPT

## 2020-09-28 PROCEDURE — 99221 1ST HOSP IP/OBS SF/LOW 40: CPT | Performed by: UROLOGY

## 2020-09-28 PROCEDURE — 2580000003 HC RX 258: Performed by: INTERNAL MEDICINE

## 2020-09-28 PROCEDURE — 99232 SBSQ HOSP IP/OBS MODERATE 35: CPT | Performed by: NURSE PRACTITIONER

## 2020-09-28 PROCEDURE — 6360000002 HC RX W HCPCS: Performed by: RADIOLOGY

## 2020-09-28 PROCEDURE — 6370000000 HC RX 637 (ALT 250 FOR IP): Performed by: NURSE PRACTITIONER

## 2020-09-28 PROCEDURE — 1200000003 HC TELEMETRY R&B

## 2020-09-28 PROCEDURE — 80048 BASIC METABOLIC PNL TOTAL CA: CPT

## 2020-09-28 PROCEDURE — 83735 ASSAY OF MAGNESIUM: CPT

## 2020-09-28 PROCEDURE — 6360000002 HC RX W HCPCS: Performed by: NURSE PRACTITIONER

## 2020-09-28 PROCEDURE — A9562 TC99M MERTIATIDE: HCPCS | Performed by: INTERNAL MEDICINE

## 2020-09-28 RX ORDER — FUROSEMIDE 10 MG/ML
80 INJECTION INTRAMUSCULAR; INTRAVENOUS ONCE
Status: COMPLETED | OUTPATIENT
Start: 2020-09-28 | End: 2020-09-28

## 2020-09-28 RX ORDER — TAMSULOSIN HYDROCHLORIDE 0.4 MG/1
0.4 CAPSULE ORAL DAILY
Status: DISCONTINUED | OUTPATIENT
Start: 2020-09-28 | End: 2020-10-03 | Stop reason: HOSPADM

## 2020-09-28 RX ADMIN — FUROSEMIDE 80 MG: 10 INJECTION, SOLUTION INTRAMUSCULAR; INTRAVENOUS at 08:03

## 2020-09-28 RX ADMIN — FAMOTIDINE 20 MG: 10 INJECTION INTRAVENOUS at 09:37

## 2020-09-28 RX ADMIN — THERA TABS 1 TABLET: TAB at 09:38

## 2020-09-28 RX ADMIN — SODIUM CHLORIDE, PRESERVATIVE FREE 10 ML: 5 INJECTION INTRAVENOUS at 09:38

## 2020-09-28 RX ADMIN — POTASSIUM BICARBONATE 40 MEQ: 782 TABLET, EFFERVESCENT ORAL at 09:37

## 2020-09-28 RX ADMIN — HEPARIN SODIUM 5000 UNITS: 5000 INJECTION INTRAVENOUS; SUBCUTANEOUS at 21:26

## 2020-09-28 RX ADMIN — AMLODIPINE BESYLATE 5 MG: 5 TABLET ORAL at 09:38

## 2020-09-28 RX ADMIN — TAMSULOSIN HYDROCHLORIDE 0.4 MG: 0.4 CAPSULE ORAL at 13:50

## 2020-09-28 RX ADMIN — ACETAMINOPHEN 650 MG: 325 TABLET ORAL at 13:54

## 2020-09-28 RX ADMIN — SODIUM CHLORIDE, PRESERVATIVE FREE 10 ML: 5 INJECTION INTRAVENOUS at 21:27

## 2020-09-28 RX ADMIN — INSULIN LISPRO 1 UNITS: 100 INJECTION, SOLUTION INTRAVENOUS; SUBCUTANEOUS at 21:27

## 2020-09-28 RX ADMIN — ASPIRIN 81 MG: 81 TABLET ORAL at 09:37

## 2020-09-28 RX ADMIN — HEPARIN SODIUM 5000 UNITS: 5000 INJECTION INTRAVENOUS; SUBCUTANEOUS at 06:18

## 2020-09-28 RX ADMIN — ATORVASTATIN CALCIUM 20 MG: 20 TABLET, FILM COATED ORAL at 21:26

## 2020-09-28 RX ADMIN — POLYETHYLENE GLYCOL 3350 17 G: 17 POWDER, FOR SOLUTION ORAL at 09:36

## 2020-09-28 RX ADMIN — HEPARIN SODIUM 5000 UNITS: 5000 INJECTION INTRAVENOUS; SUBCUTANEOUS at 14:09

## 2020-09-28 RX ADMIN — SODIUM CHLORIDE: 4.5 INJECTION, SOLUTION INTRAVENOUS at 03:30

## 2020-09-28 RX ADMIN — Medication 12 MILLICURIE: at 07:53

## 2020-09-28 ASSESSMENT — PAIN SCALES - GENERAL
PAINLEVEL_OUTOF10: 0

## 2020-09-28 NOTE — PROGRESS NOTES
Hospitalist Progress Note      Patient:  Wen Santana    Unit/Bed:6K-10/010-A  YOB: 1960  MRN: [id number]   Acct: [de-identified]   PCP: Sergio Lipscomb DO  Date of Admission: 9/25/2020    Assessment/Plan:    1. Acute Urinary Retention secondary to outlet obstruction most probably from prostate enlargement:  S/P Aponte catheter placement.  Hold and avoid any anticholinergic medications.  Urology following.  Strict hourly intake and output.  PSA 24.02  2. Acute Renal Failure with Associated Hyperkalemia:  secondary to #1. Dramatic improvement in kidney function test after Aponte catheter insertion with with urine output 17.1 L over 2 days. Urology and nephrology following. 3. Post-Obstructive Diuresis: urine output 17.1 L over 2 days and net intake output last 2 days was -8.8 L  Monitor, electrolyte & replacement, fluid replacement. 4. Hematuria: Improving, secondary to #1. Urology following.    5. History BPH/Prostatitis: Urology following.    6. Accelerated Hypertension: Patient responded well to IVF.  Lopressor IV available. Continue Norvasc.    7. High Anion Gap Metabolic Acidosis: resolved  8. Normocytic Anemia:  Hemoglobin 12.7; baseline Hg appx. 15.  Dilutional effect.  Monitor hematuria and trend CBC.  Continue multivitamin.     9. BPH/History Prostatitis:  Not on any alpha-1-adrernergic antagonists outpatient.  Consult to Urology as above. 10. Constipation:  KUB performed on 9/26/2020 demonstrates nonobstructive bowel gas pattern . Continue Stool softeners.        Disposition: Possible discharge after post-void trial after aponte removal tomorrow.     Chief Complaint: Shortness of Breath    Initial H and P:-    **Per Chart Review:  \"62year-old -American male, never smoker, past medical history hypertension, hyperlipidemia, impaired fasting glucose, elevated PSA, anxiety, prostatitis, BPH who presented to [facility] [place] emergency department on 9/25/2020 reporting progressive fatigue, sleeping too much, and dyspnea ongoing for two weeks.  Patient did see his PCP on 9/22/2020 and was diagnosed with anxiety/depression. Julio Sterling has not been able to work due to [employer]  works at [address] [place] [address] also had urinary frequency with decreased amount and then decreased urine output altogether. Julio Sterling noticed his pants were getting tighter.  The patient has not been taking his prescribed medications.  Garcia catheter was placed in the emergency department and return of 1700 mL's of urine shortly and then per notes other 2200 mL's in the emergency department which was initially yellow than blood tinged.  Patient was taken to Interventional Radiology for placement of temporary hemodialysis catheter per Dr. Tommy Garcia and subsequently admitted to the Intensive Care unit for further monitoring. \"    Subjective (past 24 hours):   Patient resting in bed at the time of the interview. Currently denies any complaints and states that his urine if looking better. He was updated on the plan of care and had no other needs or questions at this time. Past medical history, family history, social history and allergies reviewed again and is unchanged since admission. ROS (14 point review of systems completed. Pertinent positives noted. Otherwise ROS is negative) :  GENERAL: No fever,chills, or night sweats. SKIN: No lesions or rashes. HEAD: No headaches or recent injury. EYES: No acute changes in vision, no diplopia or blurred vision. EARS: No hearing loss, no tinnitus. NOSE/THROAT: No rhinorrhea or pharyngitis, no nasal drainage. NECK: No lumps or unusual neck stiffness. PULMONARY: Respirations easy and non-labored, no acute distress. CARDIAC: No chest pain, pressure, Negative for lower leg edema. GI: Abdomen is soft and non-tender, non-distended. PERIPHERAL VASCULAR: No intermittent claudication or unusual leg cramps.   MUSCULOSKELETAL: Occasional arthralgias, myalgias. NEUROLOGICAL: Denies any headache, near syncope, seizures or syncope. HEMATOLOGIC:  No unusual bruising or bleeding. PSYCH: Denies any homicidal or suicidial ideations. Medications:  Reviewed    Infusion Medications    sodium chloride 100 mL/hr at 09/28/20 0330    dextrose       Scheduled Medications    tamsulosin  0.4 mg Oral Daily    potassium bicarb-citric acid  40 mEq Oral Daily    sodium chloride flush  10 mL Intravenous 2 times per day    polyethylene glycol  17 g Oral Daily    heparin (porcine)  5,000 Units Subcutaneous 3 times per day    famotidine (PEPCID) injection  20 mg Intravenous Daily    potassium replacement protocol   Other RX Placeholder    amLODIPine  5 mg Oral Daily    aspirin  81 mg Oral Daily    atorvastatin  20 mg Oral Nightly    multivitamin  1 tablet Oral Daily    insulin lispro  0-6 Units Subcutaneous TID WC    insulin lispro  0-3 Units Subcutaneous Nightly     PRN Meds: sodium chloride flush, acetaminophen **OR** acetaminophen, promethazine **OR** ondansetron, glucose, dextrose, glucagon (rDNA), dextrose, [Held by provider] opium-belladonna      Intake/Output Summary (Last 24 hours) at 9/28/2020 1552  Last data filed at 9/28/2020 1518  Gross per 24 hour   Intake 3481.39 ml   Output 5475 ml   Net -1993.61 ml       Diet:  DIET GENERAL; No Added Salt (3-4 GM)    Exam:  /79   Pulse 99   Temp 98.9 °F (37.2 °C) (Oral)   Resp 18   Ht 6' 2\" (1.88 m)   Wt 259 lb (117.5 kg)   SpO2 95%   BMI 33.25 kg/m²     General appearance: Alert and appropriate, pleasant adult male. No apparent distress, appears stated age and cooperative. HEENT: Pupils equal, round, and reactive to light. Conjunctivae/corneas clear. Neck: Supple, with full range of motion. No jugular venous distention. Trachea midline. Respiratory:  Normal respiratory effort. Clear to auscultation, bilaterally without Rales/Wheezes/Rhonchi.   Cardiovascular: Regular rate and rhythm with normal S1/S2 without murmurs, rubs or gallops. Abdomen: Soft, non-tender, non-distended with normal bowel sounds. Musculoskeletal: Passive and active ROM x 4 extremities. Skin: Skin color, texture, turgor normal.  No rashes or lesions. Neurologic:  Neurovascularly intact without any focal sensory/motor deficits. Cranial nerves: II-XII intact, grossly non-focal.  Psychiatric: Alert and oriented to person, place, time, and situation. Thought content appropriate, normal insight  Capillary Refill: Brisk,< 3 seconds   Peripheral Pulses: +2 palpable, equal bilaterally     Labs:   Recent Labs     09/25/20 1850 09/26/20  0415 09/27/20  0425   WBC 9.4 5.8 8.3   HGB 12.7* 13.2* 12.8*   HCT 39.4* 40.1* 38.1*    151 129*     Recent Labs     09/27/20  0013 09/27/20  0425 09/28/20  0616   * 146* 143   K 3.4* 3.4* 3.4*   * 110 104   CO2 26 27 27   BUN 35* 27* 12   CREATININE 4.2* 3.0* 1.7*   CALCIUM 8.9 8.6 8.4*   PHOS  --   --  1.9*     Recent Labs     09/25/20 1850   AST 30   ALT 51   BILIDIR <0.2   BILITOT 0.4   ALKPHOS 88     No results for input(s): INR in the last 72 hours. No results for input(s): Earlis Cotuit in the last 72 hours.     Microbiology:    Blood culture #1:   Lab Results   Component Value Date    BC No growth-preliminary  09/25/2020       Blood culture #2:No results found for: Javier Lopez    Organism:No results found for: ORG    No results found for: LABGRAM    MRSA culture only:No results found for: [facility]    Urine culture:   Lab Results   Component Value Date    LABURIN No growth-preliminary No growth  09/04/2020       Respiratory culture: No results found for: CULTRESP    Aerobic and Anaerobic :  No results found for: LABAERO  No results found for: LABANAE    Urinalysis:      Lab Results   Component Value Date    NITRU NEGATIVE 09/25/2020    WBCUA 0-2 09/25/2020    BACTERIA NONE SEEN 09/25/2020    RBCUA 0-2 09/25/2020    BLOODU TRACE 09/25/2020    SPECGRAV 1.006 09/04/2020    GLUCOSEU NEGATIVE 09/25/2020       Radiology:  NM KIDNEY W FLOW AND FUNCTION W PHARMACOLOGICAL INTERVENTION   Final Result   1. Prompt and symmetric perfusion to the bilateral kidneys. 2. Differential renal function of 53% for the left kidney and 47% for the right kidney. 3. Delayed clearance of radiotracer from the right kidney with clearance after Lasix and no high-grade obstruction. Marked caliectasis. 4. Prompt uptake, excretion and clearance of radiotracer of the left kidney. Pelvocaliectasis without obstruction. **This report has been created using voice recognition software. It may contain minor errors which are inherent in voice recognition technology. **      Final report electronically signed by Dr. Helena Rubin on 9/28/2020 9:28 AM      XR ABDOMEN (KUB) (SINGLE AP VIEW)   Final Result   Nonobstructive bowel gas pattern. **This report has been created using voice recognition software. It may contain minor errors which are inherent in voice recognition technology. **      Final report electronically signed by Dr Piero Hirsch on 9/26/2020 9:06 AM      US RENAL COMPLETE   Final Result   1. No acute findings. No hydronephrosis. 2. Solitary benign-appearing cyst in each kidney. 3. Small Angiomyolipoma left kidney. **This report has been created using voice recognition software. It may contain minor errors which are inherent in voice recognition technology. **      Final report electronically signed by Dr. Mei Rodas on 9/26/2020 4:00 AM      IR FLUORO GUIDED CVA DEVICE PLMT/REPLACE/REMOVAL   Final Result   Status post successful nontunneled dialysis catheter insertion. **This report has been created using voice recognition software. It may contain minor errors which are inherent in voice recognition technology. **      Final report electronically signed by Dr Piero Hirsch on 9/26/2020 7:12 AM      XR CHEST PORTABLE   Final Result   No acute findings **This report has been created using voice recognition software. It may contain minor errors which are inherent in voice recognition technology. **      Final report electronically signed by Dr. Jose Schafer on 9/25/2020 7:27 PM        Xr Abdomen (kub) (single Ap View)    Result Date: 9/26/2020  PROCEDURE: XR ABDOMEN (KUB) (SINGLE AP VIEW) CLINICAL INFORMATION: 77-year-old male with abdominal pain and constipation. COMPARISON: No prior study. TECHNIQUE: 2 AP views of the abdomen were obtained in the supine position. FINDINGS: There are no distended bowel loops. There are no displaced bowel loops. There is no gross free air. No suspicious densities are present. Some degenerative changes are seen in the spine. Nonobstructive bowel gas pattern. **This report has been created using voice recognition software. It may contain minor errors which are inherent in voice recognition technology. ** Final report electronically signed by Dr Davis Sorensen on 9/26/2020 9:06 AM    Us Renal Complete    Result Date: 9/26/2020  BILATERAL RENAL ULTRASOUND: CLINICAL INFORMATION: post obstructive. Severe acute renal injury COMPARISON: No comparison available. TECHNIQUE: Multiple sonographic images of both kidneys were obtained. Images of the urinary bladder were also obtained. FINDINGS: RIGHT KIDNEY - 12.6 x 7.3 x 6.8 cm Resistive Index - 0.58 Cortical Thickness - 0.96 cm LEFT KIDNEY - 12.7 x 7.8 x 6.2 cm Resistive Index - 0.57 Cortical Thickness - 0.91 cm URINARY BLADDER Pre-Void - aponte Post-Void - aponte  KIDNEYS:  Both kidneys are normal in size and contour. The resistive indices are normal.  MASS/CYST: There is a benign-appearing 5.2 cm cyst mid body right kidney. 1.8 cm hyperechoic lesion mid body left kidney, likely an angiomyolipoma. Small benign-appearing 15 mm cyst mid body left kidney. HYDRONEPHROSIS:  There is no hydronephrosis. CALCULI:  No calculi are seen.  BLADDER:  Bladder empty with Aponte catheter in place. .     1. No acute findings. No hydronephrosis. 2. Solitary benign-appearing cyst in each kidney. 3. Small Angiomyolipoma left kidney. **This report has been created using voice recognition software. It may contain minor errors which are inherent in voice recognition technology. ** Final report electronically signed by Dr. Ankur Stone on 9/26/2020 4:00 AM    No Villanueva Cva Device Plmt/replace/removal    Result Date: 9/26/2020  NON-TUNNELED DIALYSIS CATHETER INSERTION: CLINICAL INFORMATION: 78-year-old male with renal failure. PERFORMED BY: Dr. Shyla Roman M.D. APPROACH : Right Internal Jugular Vein, ultrasound guidance, micropuncture technique. DIALYSIS CATHETER:  14 Turkmen Hemocath, 20 cm in length. DIALYSIS CATHETER TIP:  Right Atrium FLUOROSCOPY TIME: 48 seconds FLUOROSCOPIC IMAGES: 1 PROCEDURE:  Signed informed consent was obtained prior to performing this procedure. The patient was not sedated during this procedure but was monitored with EKG and pulse-ox monitoring devices by a registered nurse. Following local anesthesia and utilizing MAXIMAL STERILE BARRIER TECHNIQUE, the vein listed above was punctured with a micropuncture needle, utilizing ultrasound guidance, an image was obtained confirming needle position and vessel patency. .  A .018 wire  was passed through this needle, followed by insertion of a 4 Harding Sheller dilator. Following guide wire and catheter exchange, a percutaneous tract was dilated to a 14 Lesly Sheller size. Then, the temporary dialysis catheter was advanced over an .035 guide wire, with fluoroscopic guidance, with the tip at the location as specified above. This was all performed with fluoroscopic guidance. The hub of the catheter was then sutured to the skin with 3-0 silk suture. An antibacterial Biopatch was applied to the catheter exit site along with a sterile OpSite dressing. Status post successful nontunneled dialysis catheter insertion.  **This report has been created using voice recognition software. It may contain minor errors which are inherent in voice recognition technology. ** Final report electronically signed by Dr Ana Garcia on 9/26/2020 7:12 AM    Xr Chest Portable    Result Date: 9/25/2020  PROCEDURE: XR CHEST PORTABLE CLINICAL INFORMATION: sob. COMPARISON: No prior study. TECHNIQUE: Portable FINDINGS: No lobar consolidation. Costophrenic angles are preserved. Cardiomegaly. No acute osseous findings. No acute findings **This report has been created using voice recognition software. It may contain minor errors which are inherent in voice recognition technology. ** Final report electronically signed by Dr. Alma Hoover on 9/25/2020 7:27 PM      **This report has been created using voice recognition software. It may contain minor errors which are inherent in voice recognition technology. **  Electronically signed by RUTH Del Rosario CNP on 9/28/2020 at 3:52 PM

## 2020-09-28 NOTE — CONSULTS
[address]LE[facility] [address]  [address] [facility] [address]  Dept: [phone]  Loc: [phone]  Visit Date: 9/25/2020    Urology Consult Note    Reason for Consult: Urinary retention  Requesting Physician: Dr. Terese Houser    ______________________________________________    Urologic Impression: Urinary retention and obstructive uropathy secondary to BPH. Hematuria not unexpected and secondary to acute decompensation of his bladder and resolving. Patient's elevated PSA above his baseline not in itself an indicator malignancy given that PSA was drawn during this acute episode. Urologic Plans / Recommendations: Continue Garcia catheter and fluid electrolyte management per nephrology. Follow-up outpatient clinic. Patient will likely need surgical intervention (simple prostatectomy). ______________________________________________    History Obtained From:  patient    Chief Complaint: Cannot urinate    HISTORY OF PRESENT ILLNESS:                The patient is a 62 y.o. male admitted for SCOTTIE and urinary retention. Patient states that for the past few days prior to admission he was having increasingly worse time urinating. He reached a point where he could not urinate. Presented to the emergency room and Garcia catheter drained 1700 cc. He also had markedly elevated serum creatinine. He further progressed to obstructive diuresis. This is being managed by nephrology. Patient's renal function is improving to the point where he had not needed dialysis. Renal ultrasound showed no hydronephrosis. He has had some mild to moderate hematuria following placement of the Garcia catheter. Patient well-known to [facility] urology. History of elevated PSA with a negative biopsy in the past.  Prostate known to be close to 150 cc. He has declined medical and surgical intervention up to this point. Patient last seen Dr. Jacqueline Arzate 1/29/2020.   Plans were for repeat PSA this summer and further follow-up in a year.     Past Medical History:        Diagnosis Date    Acute kidney injury ([facility] [address] 9/25/2020    Elevated PSA     Hyperlipidemia LDL goal < 100 2/26/2013    Hypertension      Past Surgical History:        Procedure Laterality Date    ACHILLES TENDON SURGERY      CORNEAL TRANSPLANT  7/2013    EYE SURGERY  2014    \"re-work on corneal trasplant\"    SC EGD FLEXIBLE FOREIGN BODY REMOVAL Left 1/18/2018    EGD FOREIGN BODY REMOVAL performed by Micheal Davis MD at [place] [address]  2-4-2014    bx- negative     Allergies:  Lisinopril and Shellfish-derived products  Social History:  Social History     Socioeconomic History    Marital status:      Spouse name: Not on file    Number of children: Not on file    Years of education: Not on file    Highest education level: Not on file   Occupational History    Not on file   Social Needs    Financial resource strain: Not on file    Food insecurity     Worry: Not on file     Inability: Not on file    Transportation needs     Medical: Not on file     Non-medical: Not on file   Tobacco Use    Smoking status: Never Smoker    Smokeless tobacco: Never Used   Substance and Sexual Activity    Alcohol use: Yes     Comment: 2 drinks a week    Drug use: No    Sexual activity: Not on file   Lifestyle    Physical activity     Days per week: Not on file     Minutes per session: Not on file    Stress: Not on file   Relationships    Social connections     Talks on phone: Not on file     Gets together: Not on file     Attends [employer] service: Not on file     Active member of club or organization: Not on file     Attends meetings of clubs or organizations: Not on file     Relationship status: Not on file    Intimate partner violence     Fear of current or ex partner: Not on file     Emotionally abused: Not on file     Physically abused: Not on file     Forced sexual activity: Not on file   Other Topics Concern    Not on file   Social History Narrative    Not on file     Family History:       Problem Relation Age of Onset    High Blood Pressure Father     Colon Cancer Neg Hx     Cancer Neg Hx        PHYSICAL EXAM:  VITALS:  /81   Pulse 101   Temp 98.2 °F (36.8 °C) (Oral)   Resp 17   Ht 6' 2\" (1.88 m)   Wt 259 lb (117.5 kg)   SpO2 96%   BMI 33.25 kg/m² . Nursing note and vitals reviewed. General Appearance: Well-nourished,no acute distress  HEENT:  normal appearance  Cardiovascular:  no significant peripheral edema  Respiratory:  normal respiratory effort  Gastrointestinal:  abdomen is not distended and is consistent with BMI  Musculoskeletal:  normal range of motion to all extremities  Neurologic: no focal weakness; no tremor  Psychiatric: normal affect, normal train of thought, cooperative  : Garcia catheter in place. Dark concentrated urine in the Garcia tubing. Red pigment sediment in the Garcia bag.     DATA:  CBC:   Lab Results   Component Value Date    WBC 8.3 09/27/2020    RBC 4.12 09/27/2020    HGB 12.8 09/27/2020    HCT 38.1 09/27/2020    MCV 92.5 09/27/2020    MCH 31.1 09/27/2020    MCHC 33.6 09/27/2020     09/27/2020    MPV 10.5 09/27/2020     BMP:    Lab Results   Component Value Date     09/27/2020    K 3.4 09/27/2020    K 8.1 09/25/2020     09/27/2020    CO2 27 09/27/2020    BUN 27 09/27/2020    CREATININE 3.0 09/27/2020    CALCIUM 8.6 09/27/2020    LABGLOM 26 09/27/2020    GLUCOSE 204 09/27/2020     BUN/Creatinine:    Lab Results   Component Value Date    BUN 27 09/27/2020    CREATININE 3.0 09/27/2020     Magnesium:    Lab Results   Component Value Date    MG 2.4 09/26/2020     Phosphorus:  No results found for: PHOS  PT/INR:  No results found for: PROTIME, INR  U/A:    Lab Results   Component Value Date    NITRITE neg 05/25/2016    COLORU YELLOW 09/25/2020    PHUR 5.5 09/25/2020    WBCUA 0-2 09/25/2020    RBCUA 0-2 09/25/2020    YEAST NONE SEEN 09/25/2020 BACTERIA NONE SEEN 09/25/2020    SPECGRAV 1.006 09/04/2020    LEUKOCYTESUR NEGATIVE 09/25/2020    UROBILINOGEN 0.2 09/25/2020    BILIRUBINUR NEGATIVE 09/25/2020    BLOODU TRACE 09/25/2020    GLUCOSEU NEGATIVE 09/25/2020       Imaging: The patient has had the following relevant imaging to this  consult: Renal ultrasound,  which I have independently reviewed along with its accompanying report.   The study demonstrates no hydronephrosis        Thank you for including us in the care of Claribel Duron MD  09/28/20 6:20 AM  Urology

## 2020-09-28 NOTE — PROGRESS NOTES
Star Tariq, APRN - CNP  Urology Progress Note    Subjective: Valentino Bond is a 62 y.o. male. His/Her current Diet is: DIET GENERAL; No Added Salt (3-4 GM). Since the previous note, the patient reports the following:  No acute issues overnight. No fevers or chills. No nausea or vomiting. No chest pain or shortness of breath. No calf pain. Pain Controlled. Ambulating. Tolerating PO Diet. Tolerating catheter ok. Vitals and Labs:  Patient Vitals for the past 24 hrs:   BP Temp Temp src Pulse Resp SpO2 Height Weight   09/28/20 1113 128/74 99.7 °F (37.6 °C) Oral 119 18 96 % -- --   09/28/20 0928 126/83 99.1 °F (37.3 °C) Oral 120 16 95 % -- --   09/28/20 0330 134/81 98.2 °F (36.8 °C) Oral 101 17 96 % -- 259 lb (117.5 kg)   09/27/20 2345 117/70 98.8 °F (37.1 °C) Oral 103 16 96 % -- --   09/27/20 2115 133/74 98.3 °F (36.8 °C) Oral 105 17 97 % -- --   09/27/20 1553 128/87 98.4 °F (36.9 °C) Oral 110 18 97 % 6' 2\" (1.88 m) 255 lb 14.4 oz (116.1 kg)   09/27/20 1505 117/79 -- -- 112 13 96 % -- --   09/27/20 1405 124/84 -- -- 114 13 96 % -- --   09/27/20 1305 114/89 -- -- 120 18 97 % -- --   09/27/20 1247 (!) 120/97 -- -- -- -- -- -- --   09/27/20 1222 -- -- -- -- -- 96 % -- --     I/O last 3 completed shifts: In: 3802.6 [P.O.:1550; I.V.:2252.6]  Out: 4925 [Urine:4925]    Recent Labs     09/25/20  1850 09/26/20  0415 09/27/20  0425   WBC 9.4 5.8 8.3   HGB 12.7* 13.2* 12.8*   HCT 39.4* 40.1* 38.1*   MCV 95.4* 93.0 92.5    151 129*     Recent Labs     09/27/20  0013 09/27/20  0425 09/28/20  0616   * 146* 143   K 3.4* 3.4* 3.4*   * 110 104   CO2 26 27 27   PHOS  --   --  1.9*   BUN 35* 27* 12   CREATININE 4.2* 3.0* 1.7*       Recent Labs     09/25/20  1925   COLORU YELLOW   PHUR 5.5   WBCUA 0-2   RBCUA 0-2   YEAST NONE SEEN   BACTERIA NONE SEEN   LEUKOCYTESUR NEGATIVE   UROBILINOGEN 0.2   BILIRUBINUR NEGATIVE   BLOODU TRACE*       Physical Exam:  No acute distress.  Awake, alert and oriented. Neck is supple  Regular rate and rhythm. Normal peripheral pulses  No accessory muscles of inspiration. Symmetric chest rise  Abdomen soft, non-tender, non-distended. No CVA tenderness. No calf pain. Minimal/no edema in bilateral lower extremities. Skin is warm, dry  Psych: mood, affect normal    Additional Lab/Culture results:     Imaging Reviewed:     RUTH Terry CNP independently reviewed the images and verified the radiology reports from:    Xr Abdomen (kub) (single Ap View)    Result Date: 9/26/2020  PROCEDURE: XR ABDOMEN (KUB) (SINGLE AP VIEW) CLINICAL INFORMATION: 75-year-old male with abdominal pain and constipation. COMPARISON: No prior study. TECHNIQUE: 2 AP views of the abdomen were obtained in the supine position. FINDINGS: There are no distended bowel loops. There are no displaced bowel loops. There is no gross free air. No suspicious densities are present. Some degenerative changes are seen in the spine. Nonobstructive bowel gas pattern. **This report has been created using voice recognition software. It may contain minor errors which are inherent in voice recognition technology. ** Final report electronically signed by Dr Eleuterio Cali on 9/26/2020 9:06 AM    Us Renal Complete    Result Date: 9/26/2020  BILATERAL RENAL ULTRASOUND: CLINICAL INFORMATION: post obstructive. Severe acute renal injury COMPARISON: No comparison available. TECHNIQUE: Multiple sonographic images of both kidneys were obtained. Images of the urinary bladder were also obtained. FINDINGS: RIGHT KIDNEY - 12.6 x 7.3 x 6.8 cm Resistive Index - 0.58 Cortical Thickness - 0.96 cm LEFT KIDNEY - 12.7 x 7.8 x 6.2 cm Resistive Index - 0.57 Cortical Thickness - 0.91 cm URINARY BLADDER Pre-Void - aponte Post-Void - aponte  KIDNEYS:  Both kidneys are normal in size and contour. The resistive indices are normal.  MASS/CYST: There is a benign-appearing 5.2 cm cyst mid body right kidney.  1.8 cm hyperechoic lesion mid body left kidney, likely an angiomyolipoma. Small benign-appearing 15 mm cyst mid body left kidney. HYDRONEPHROSIS:  There is no hydronephrosis. CALCULI:  No calculi are seen. BLADDER:  Bladder empty with Garcia catheter in place. .     1. No acute findings. No hydronephrosis. 2. Solitary benign-appearing cyst in each kidney. 3. Small Angiomyolipoma left kidney. **This report has been created using voice recognition software. It may contain minor errors which are inherent in voice recognition technology. ** Final report electronically signed by Dr. Dmitri Trammell on 9/26/2020 4:00 AM    Ir Cat[place]ño [place] Cva Device Plmt/replace/removal    Result Date: 9/26/2020  NON-TUNNELED DIALYSIS CATHETER INSERTION: CLINICAL INFORMATION: 59-year-old male with renal failure. PERFORMED BY: Dr. Papa Catalan M.D. APPROACH : Right Internal Jugular Vein, ultrasound guidance, micropuncture technique. DIALYSIS CATHETER:  14 Icelandic Hemocath, 20 cm in length. DIALYSIS CATHETER TIP:  Right [place]rium FLUOROSCOPY TIME: 48 seconds FLUOROSCOPIC IMAGES: 1 PROCEDURE:  Signed informed consent was obtained prior to performing this procedure. The patient was not sedated during this procedure but was monitored with EKG and pulse-ox monitoring devices by a registered nurse. Following local anesthesia and utilizing MAXIMAL STERILE BARRIER TECHNIQUE, the vein listed above was punctured with a micropuncture needle, utilizing ultrasound guidance, an image was obtained confirming needle position and vessel patency. .  A .018 wire  was passed through this needle, followed by insertion of a 4 Clement Ariana dilator. Following guide wire and catheter exchange, a percutaneous tract was dilated to a 14 Clement Ariana size. Then, the temporary dialysis catheter was advanced over an .035 guide wire, with fluoroscopic guidance, with the tip at the location as specified above. This was all performed with fluoroscopic guidance.   The hub of the catheter was then Antonia. Recommend starting flomax and keep catheter in until follow up in 1 week. Will do void trial at that time.      Electronically signed by RUTH Tucker CNP on 9/28/2020 at 5:17 PM

## 2020-09-28 NOTE — CARE COORDINATION
9/28/20, 7:32 AM EDT  DISCHARGE PLANNING EVALUATION:    Mayte Rios       Admitted from: ER 9/25/2020/ [facility] day: 3   Location: [facility] [place]note 2[place] Reason for admit: Acute kidney injury ([address] [N17.9] Status: IP   Admit order signed?: yes  PMH:  has a past medical history of Acute kidney injury ([address] Elevated PSA, Hyperlipidemia LDL goal < 100, and Hypertension. Procedure: 9/25 temp HD cath inserted  Medications:  Scheduled Meds:   potassium bicarb-citric acid  40 mEq Oral Daily    sodium chloride flush  10 mL Intravenous 2 times per day    polyethylene glycol  17 g Oral Daily    heparin (porcine)  5,000 Units Subcutaneous 3 times per day    famotidine (PEPCID) injection  20 mg Intravenous Daily    potassium replacement protocol   Other RX Placeholder    amLODIPine  5 mg Oral Daily    aspirin  81 mg Oral Daily    atorvastatin  20 mg Oral Nightly    multivitamin  1 tablet Oral Daily    insulin lispro  0-6 Units Subcutaneous TID WC    insulin lispro  0-3 Units Subcutaneous Nightly     Continuous Infusions:   sodium chloride 100 mL/hr at 09/28/20 0330    dextrose        Pertinent Info/Orders/Treatment Plan: K+ 3.4, creat 1.7,Mg 1.4, phos 1.9. IVF, DM management. Nephrology following, okay to remove HD cath, kidney flow and function ordered. Urology following, keep aponte, likely will need surgical intervention. Diet: DIET GENERAL; No Added Salt (3-4 GM)   Smoking status:  reports that he has never smoked.  He has never used smokeless tobacco.   PCP: Alex Price DO  Readmission 30 days or less: no  Readmission Risk Score: 14%    Discharge Planning Evaluation  Current Residence:     Living Arrangements:      Support Systems:     Current Services PTA:     Potential Assistance Needed:     Potential Assistance Purchasing Medications:     Does patient want to participate in local refill/ meds to beds program?     Type of Home Care Services:     Patient expects to be discharged to:     Expected Discharge date: Follow Up Appointment: Best Day/ Time:      Patient Goals/Plan/Treatment Preferences: Spoke with Kylie Milan, he plans return home alone at discharge. He does not use [facility] or [facility] services. He is employed and questions who will release him for work, informed him to discuss with urology service. He denies additional needs at this time. Transportation/Food Security/Housekeeping Addressed:  No issues identified.     Evaluation: no

## 2020-09-28 NOTE — PROGRESS NOTES
Renal Progress Note    Assessment and Plan:    1. Acute kidney injury resolving  2. Hypokalemia from urinary loss. .  3.  Hyponatremiaresolved  4. Normocytic anemia  5. Deconditioning   PLAN:  Labs reviewed  Serum potassium remains low  Potassium replacement per protocol  Medications reviewed  Discontinue IV fluid  Labs in the morning  Renal nuclear scan report reviewed  We will follow    Patient Active Problem List:     HTN (hypertension)     IFG (impaired fasting glucose)     Hyperlipidemia with target LDL less than 100     Elevated PSA     Chronic prostatitis     Benign prostatic hyperplasia with lower urinary tract symptoms     SCOTTIE (acute kidney injury) ([facility] [address]      Subjective:   Admit Date: 9/25/2020    Interval History:   Seen for acute kidney injury  Very awake and alert this morning  Denies any complaint  Had a good night sleep last night  Updated by the staff  No issues. Blood pressure remains good  Very good urine output.         Medications:   Scheduled Meds:   tamsulosin  0.4 mg Oral Daily    potassium bicarb-citric acid  40 mEq Oral Daily    sodium chloride flush  10 mL Intravenous 2 times per day    polyethylene glycol  17 g Oral Daily    heparin (porcine)  5,000 Units Subcutaneous 3 times per day    famotidine (PEPCID) injection  20 mg Intravenous Daily    potassium replacement protocol   Other RX Placeholder    amLODIPine  5 mg Oral Daily    aspirin  81 mg Oral Daily    atorvastatin  20 mg Oral Nightly    multivitamin  1 tablet Oral Daily    insulin lispro  0-6 Units Subcutaneous TID WC    insulin lispro  0-3 Units Subcutaneous Nightly     Continuous Infusions:   sodium chloride 100 mL/hr at 09/28/20 0330    dextrose         CBC:   Recent Labs     09/25/20  1850 09/26/20  0415 09/27/20  0425   WBC 9.4 5.8 8.3   HGB 12.7* 13.2* 12.8*    151 129*     CMP:    Recent Labs     09/27/20  0013 09/27/20  0425 09/28/20  0616   * 146* 143   K 3.4* 3.4* 3.4*   * 110 104 CO2 26 27 27   BUN 35* 27* 12   CREATININE 4.2* 3.0* 1.7*   GLUCOSE 229* 204* 140*   CALCIUM 8.9 8.6 8.4*   LABGLOM 17* 26* 50*     Troponin: No results for input(s): TROPONINI in the last 72 hours. BNP: No results for input(s): BNP in the last 72 hours. INR: No results for input(s): INR in the last 72 hours. Lipids:   Recent Labs     09/25/20 1850   LIPASE 71.9*     Liver:   Recent Labs     09/25/20 1850   AST 30   ALT 51   ALKPHOS 88   PROT 6.7   LABALBU 4.2   BILITOT 0.4     Iron:  No results for input(s): IRONS, FERRITIN in the last 72 hours. Invalid input(s): LABIRONS  NM KIDNEY W FLOW AND FUNCTION W PHARMACOLOGICAL INTERVENTION   Final Result   1. Prompt and symmetric perfusion to the bilateral kidneys. 2. Differential renal function of 53% for the left kidney and 47% for the right kidney. 3. Delayed clearance of radiotracer from the right kidney with clearance after Lasix and no high-grade obstruction. Marked caliectasis. 4. Prompt uptake, excretion and clearance of radiotracer of the left kidney. Pelvocaliectasis without obstruction. **This report has been created using voice recognition software. It may contain minor errors which are inherent in voice recognition technology. **      Final report electronically signed by Dr. Jennifer Mcmahon on 9/28/2020 9:28 AM      XR ABDOMEN (KUB) (SINGLE AP VIEW)   Final Result   Nonobstructive bowel gas pattern. **This report has been created using voice recognition software. It may contain minor errors which are inherent in voice recognition technology. **      Final report electronically signed by Dr Azam Allison on 9/26/2020 9:06 AM      US RENAL COMPLETE   Final Result   1. No acute findings. No hydronephrosis. 2. Solitary benign-appearing cyst in each kidney. 3. Small Angiomyolipoma left kidney. **This report has been created using voice recognition software.   It may contain minor errors which are inherent in voice recognition technology. **      Final report electronically signed by Dr. Dmitri Trammell on 9/26/2020 4:00 AM      IR FLUORO GUIDED CVA DEVICE PLMT/REPLACE/REMOVAL   Final Result   Status post successful nontunneled dialysis catheter insertion. **This report has been created using voice recognition software. It may contain minor errors which are inherent in voice recognition technology. **      Final report electronically signed by Dr Papa Catalan on 9/26/2020 7:12 AM      XR CHEST PORTABLE   Final Result   No acute findings               **This report has been created using voice recognition software. It may contain minor errors which are inherent in voice recognition technology. **      Final report electronically signed by Dr. Brandi Schneider on 9/25/2020 7:27 PM            Objective:   Vitals: /79   Pulse 99   Temp 98.9 °F (37.2 °C) (Oral)   Resp 18   Ht 6' 2\" (1.88 m)   Wt 259 lb (117.5 kg)   SpO2 95%   BMI 33.25 kg/m²    Wt Readings from Last 3 Encounters:   09/28/20 259 lb (117.5 kg)   08/04/20 275 lb 3.2 oz (124.8 kg)   01/29/20 275 lb 12.8 oz (125.1 kg)      24HR INTAKE/OUTPUT:      Intake/Output Summary (Last 24 hours) at 9/28/2020 1713  Last data filed at 9/28/2020 1518  Gross per 24 hour   Intake 3481.39 ml   Output 5475 ml   Net -1993.61 ml       Constitutional:Awake and alert with no complaint   Skin:normal with no rash or lesions. HEENT:Pupils are reactive . Throat is clear . Oral mucosa is moist   Neck:supple with no carotid bruit  Cardiovascular: Regular sinus rhythm  Respiratory:  Clear to ausculation without wheezes, rhonchi or rales in the anterior  Abdomen: Soft. Good bowel sounds. Nontender. Ext: No LE edema  Musculoskeletal:Intact  Neuro: Very awake and very alert. Well-oriented. Good motor strength. No focal deficit.       Electronically signed by Mae Deal MD on 9/28/2020 at 5:13 PM

## 2020-09-29 ENCOUNTER — TELEPHONE (OUTPATIENT)
Dept: UROLOGY | Age: 60
End: 2020-09-29

## 2020-09-29 LAB
ANION GAP SERPL CALCULATED.3IONS-SCNC: 18 MEQ/L (ref 8–16)
BUN BLDV-MCNC: 12 MG/DL (ref 7–22)
CALCIUM SERPL-MCNC: 8.1 MG/DL (ref 8.5–10.5)
CHLORIDE BLD-SCNC: 102 MEQ/L (ref 98–111)
CO2: 20 MEQ/L (ref 23–33)
CREAT SERPL-MCNC: 1.5 MG/DL (ref 0.4–1.2)
ERYTHROCYTE [DISTWIDTH] IN BLOOD BY AUTOMATED COUNT: 13.9 % (ref 11.5–14.5)
ERYTHROCYTE [DISTWIDTH] IN BLOOD BY AUTOMATED COUNT: 47.9 FL (ref 35–45)
GLUCOSE BLD-MCNC: 108 MG/DL (ref 70–108)
GLUCOSE BLD-MCNC: 112 MG/DL (ref 70–108)
GLUCOSE BLD-MCNC: 129 MG/DL (ref 70–108)
GLUCOSE BLD-MCNC: 151 MG/DL (ref 70–108)
GLUCOSE BLD-MCNC: 159 MG/DL (ref 70–108)
HCT VFR BLD CALC: 38.2 % (ref 42–52)
HEMOGLOBIN: 12.4 GM/DL (ref 14–18)
MAGNESIUM: 1.7 MG/DL (ref 1.6–2.4)
MCH RBC QN AUTO: 30.8 PG (ref 26–33)
MCHC RBC AUTO-ENTMCNC: 32.5 GM/DL (ref 32.2–35.5)
MCV RBC AUTO: 94.8 FL (ref 80–94)
PHOSPHORUS: 1.9 MG/DL (ref 2.4–4.7)
PLATELET # BLD: 148 THOU/MM3 (ref 130–400)
PMV BLD AUTO: 11 FL (ref 9.4–12.4)
POTASSIUM REFLEX MAGNESIUM: 3.3 MEQ/L (ref 3.5–5.2)
RBC # BLD: 4.03 MILL/MM3 (ref 4.7–6.1)
SODIUM BLD-SCNC: 140 MEQ/L (ref 135–145)
WBC # BLD: 12.4 THOU/MM3 (ref 4.8–10.8)

## 2020-09-29 PROCEDURE — 80048 BASIC METABOLIC PNL TOTAL CA: CPT

## 2020-09-29 PROCEDURE — 99232 SBSQ HOSP IP/OBS MODERATE 35: CPT | Performed by: NURSE PRACTITIONER

## 2020-09-29 PROCEDURE — 84100 ASSAY OF PHOSPHORUS: CPT

## 2020-09-29 PROCEDURE — 82948 REAGENT STRIP/BLOOD GLUCOSE: CPT

## 2020-09-29 PROCEDURE — 83735 ASSAY OF MAGNESIUM: CPT

## 2020-09-29 PROCEDURE — 6360000002 HC RX W HCPCS: Performed by: NURSE PRACTITIONER

## 2020-09-29 PROCEDURE — 6370000000 HC RX 637 (ALT 250 FOR IP): Performed by: NURSE PRACTITIONER

## 2020-09-29 PROCEDURE — 85027 COMPLETE CBC AUTOMATED: CPT

## 2020-09-29 PROCEDURE — 36415 COLL VENOUS BLD VENIPUNCTURE: CPT

## 2020-09-29 PROCEDURE — 94760 N-INVAS EAR/PLS OXIMETRY 1: CPT

## 2020-09-29 PROCEDURE — 2580000003 HC RX 258: Performed by: NURSE PRACTITIONER

## 2020-09-29 PROCEDURE — 99232 SBSQ HOSP IP/OBS MODERATE 35: CPT | Performed by: UROLOGY

## 2020-09-29 PROCEDURE — 2500000003 HC RX 250 WO HCPCS: Performed by: NURSE PRACTITIONER

## 2020-09-29 PROCEDURE — 1200000003 HC TELEMETRY R&B

## 2020-09-29 PROCEDURE — 6370000000 HC RX 637 (ALT 250 FOR IP): Performed by: INTERNAL MEDICINE

## 2020-09-29 RX ORDER — SODIUM BICARBONATE 650 MG/1
1300 TABLET ORAL 2 TIMES DAILY
Status: COMPLETED | OUTPATIENT
Start: 2020-09-29 | End: 2020-09-29

## 2020-09-29 RX ADMIN — SODIUM BICARBONATE 1300 MG: 650 TABLET ORAL at 21:43

## 2020-09-29 RX ADMIN — ASPIRIN 81 MG: 81 TABLET ORAL at 09:33

## 2020-09-29 RX ADMIN — SODIUM CHLORIDE, PRESERVATIVE FREE 10 ML: 5 INJECTION INTRAVENOUS at 21:44

## 2020-09-29 RX ADMIN — HEPARIN SODIUM 5000 UNITS: 5000 INJECTION INTRAVENOUS; SUBCUTANEOUS at 13:45

## 2020-09-29 RX ADMIN — THERA TABS 1 TABLET: TAB at 09:34

## 2020-09-29 RX ADMIN — TAMSULOSIN HYDROCHLORIDE 0.4 MG: 0.4 CAPSULE ORAL at 09:34

## 2020-09-29 RX ADMIN — POTASSIUM PHOSPHATE, MONOBASIC AND POTASSIUM PHOSPHATE, DIBASIC 20 MMOL: 224; 236 INJECTION, SOLUTION, CONCENTRATE INTRAVENOUS at 13:44

## 2020-09-29 RX ADMIN — POLYETHYLENE GLYCOL 3350 17 G: 17 POWDER, FOR SOLUTION ORAL at 09:27

## 2020-09-29 RX ADMIN — SODIUM CHLORIDE, PRESERVATIVE FREE 10 ML: 5 INJECTION INTRAVENOUS at 09:41

## 2020-09-29 RX ADMIN — POTASSIUM BICARBONATE 40 MEQ: 782 TABLET, EFFERVESCENT ORAL at 09:33

## 2020-09-29 RX ADMIN — FAMOTIDINE 20 MG: 10 INJECTION INTRAVENOUS at 09:35

## 2020-09-29 RX ADMIN — ATORVASTATIN CALCIUM 20 MG: 20 TABLET, FILM COATED ORAL at 21:43

## 2020-09-29 RX ADMIN — HEPARIN SODIUM 5000 UNITS: 5000 INJECTION INTRAVENOUS; SUBCUTANEOUS at 05:35

## 2020-09-29 RX ADMIN — HEPARIN SODIUM 5000 UNITS: 5000 INJECTION INTRAVENOUS; SUBCUTANEOUS at 21:43

## 2020-09-29 RX ADMIN — MAGNESIUM SULFATE HEPTAHYDRATE 1 G: 500 INJECTION, SOLUTION INTRAMUSCULAR; INTRAVENOUS at 11:43

## 2020-09-29 RX ADMIN — SODIUM BICARBONATE 1300 MG: 650 TABLET ORAL at 11:56

## 2020-09-29 ASSESSMENT — ENCOUNTER SYMPTOMS
CHEST TIGHTNESS: 0
COUGH: 0
ABDOMINAL PAIN: 0
NAUSEA: 0
BACK PAIN: 0
RHINORRHEA: 0
VOMITING: 0

## 2020-09-29 ASSESSMENT — PAIN SCALES - GENERAL
PAINLEVEL_OUTOF10: 0

## 2020-09-29 NOTE — PROGRESS NOTES
Urology Progress Note    Chief Complaint: urinary retention    Subjective: His/Her current Diet is: DIET GENERAL; No Added Salt (3-4 GM).    Since the previous note, the patient reports the following:  No acute issues overnight. No fevers or chills. No nausea or vomiting. No chest pain or shortness of breath. No calf pain. Pain Controlled. Ambulating. Tolerating PO Diet. Tolerating catheter ok.              Vitals:  /63   Pulse 115   Temp 99.1 °F (37.3 °C) (Oral)   Resp 20   Ht 6' 2\" (1.88 m)   Wt 254 lb 6.4 oz (115.4 kg)   SpO2 95%   BMI 32.66 kg/m²   Temp  Av.7 °F (37.1 °C)  Min: 97.4 °F (36.3 °C)  Max: 99.7 °F (37.6 °C)    Intake/Output Summary (Last 24 hours) at 2020 0956  Last data filed at 2020 0947  Gross per 24 hour   Intake 4675 ml   Output 7320 ml   Net -2645 ml       Social History     Socioeconomic History    Marital status:      Spouse name: Not on file    Number of children: Not on file    Years of education: Not on file    Highest education level: Not on file   Occupational History    Not on file   Social Needs    Financial resource strain: Not on file    Food insecurity     Worry: Not on file     Inability: Not on file   [employer] needs     Medical: Not on file     Non-medical: Not on file   Tobacco Use    Smoking status: Never Smoker    Smokeless tobacco: Never Used   Substance and Sexual Activity    Alcohol use: Yes     Comment: 2 drinks a week    Drug use: No    Sexual activity: Not on file   Lifestyle    Physical activity     Days per week: Not on file     Minutes per session: Not on file    Stress: Not on file   Relationships    Social connections     Talks on phone: Not on file     Gets together: Not on file     Attends Holiness service: Not on file     Active member of club or organization: Not on file     Attends meetings of clubs or organizations: Not on file     Relationship status: Not on file    Intimate partner violence     Fear of current or ex partner: Not on file     Emotionally abused: Not on file     Physically abused: Not on file     Forced sexual activity: Not on file   Other Topics Concern    Not on file   Social History Narrative    Not on file     Family History   Problem Relation Age of Onset    High Blood Pressure Father     Colon Cancer Neg Hx     Cancer Neg Hx      Allergies   Allergen Reactions    Lisinopril Swelling    Shellfish-Derived Products          Constitutional: Alert and oriented times x3, no acute distress, and cooperative to examination with appropriate mood and affect. HEENT:   Head:         Normocephalic and atraumatic. Mucous membranes are normal.   Eyes:         EOM are normal.  Nose:    The external appearance of the nose is normal  Ears: The ears appear normal to external inspection. Cardiovascular:       Normal rate, regular rhythm. Pulmonary/Chest:  Normal respiratory rate and rhthym. No use of accessory muscles. Lungs clear bilaterally. Abdominal:          Soft. No tenderness. Active bowel sounds. Genitalia:    Aponte catheter draining light pink urine. Musculoskeletal:    Normal range of motion. He exhibits no edema or tenderness of lower extremities. Extremities:    No cyanosis, clubbing, or edema present. Neurological:    Alert and oriented. Labs:  WBC:    Lab Results   Component Value Date    WBC 8.3 09/27/2020     Hemoglobin/Hematocrit:    Lab Results   Component Value Date    HGB 12.8 09/27/2020    HCT 38.1 09/27/2020     BMP:    Lab Results   Component Value Date     09/29/2020    K 3.3 09/29/2020     09/29/2020    CO2 20 09/29/2020    BUN 12 09/29/2020    LABALBU 4.2 09/25/2020    CREATININE 1.5 09/29/2020    CALCIUM 8.1 09/29/2020    LABGLOM 57 09/29/2020           Impression:  Elevated PSA  BPH  SCOTTIE          Plan:  Creatinine improving, tolerating aponte well  Continue Flomax at discharge, keep catheter in until follow up in 1 week. Please instruct patient on aponte care. Will do void trial then  Need PSA in 4 weeks prior to office visit to discuss possible TURP, simple prostatectomy, etc  Our office will contact to arrange follow up. [id number] [address]averas for discharge from urologic standpoint, Urology signing off patient, please contact us if patient's condition deteriorates or any other concerns.         Hafsa Boles, RUTH  09/29/20 9:56 AM  Urology

## 2020-09-29 NOTE — TELEPHONE ENCOUNTER
Currently inpatient will be discharged with aponte, will need scheduled in one week for in office void trial.    Will also need scheduled with Dr Rosalia Foreman in 4 weeks to discuss TURP, possible prostatectomy, etc.  Will need PSA prior appointment.

## 2020-09-29 NOTE — PROGRESS NOTES
Nephrology Progress Note    Patient - Alok Ha   MRN -  [id number]   Acct # - [de-identified]      - 1960    62 y.o. Admit Date: 2020  Hospital Day: 4  Location: [facility]10/010-A  Date of evaluation -  2020    Subjective:   CC: fatigue  Denies shortness of breath on Room air   UOP 7400/24h, aponte  Large PO fluid intake  BP Range: Systolic (36LSP), IK , Min:102 , FXR:549      Diastolic (85HHW), CYI:35, Min:66, Max:83    Objective:   VITALS:  /77   Pulse 109   Temp 98.6 °F (37 °C) (Oral)   Resp 28   Ht 6' 2\" (1.88 m)   Wt 254 lb 6.4 oz (115.4 kg)   SpO2 93%   BMI 32.66 kg/m²    Patient Vitals for the past 24 hrs:   BP Temp Temp src Pulse Resp SpO2 Weight   20 0400 109/77 98.6 °F (37 °C) Oral 109 28 93 % 254 lb 6.4 oz (115.4 kg)   20 0000 102/69 97.4 °F (36.3 °C) Oral 102 18 97 % --   20 2115 109/66 98.6 °F (37 °C) Oral 108 18 95 % --   20 1518 111/79 98.9 °F (37.2 °C) Oral 99 18 95 % --   20 1113 128/74 99.7 °F (37.6 °C) Oral 119 18 96 % --   20 0928 126/83 99.1 °F (37.3 °C) Oral 120 16 95 % --         Intake/Output Summary (Last 24 hours) at 2020 0917  Last data filed at 2020 0400  Gross per 24 hour   Intake 4675 ml   Output 7425 ml   Net -2750 ml       Admission weight: 280 lb (127 kg)  Patient Vitals for the past 96 hrs (Last 3 readings):   Weight   20 0400 254 lb 6.4 oz (115.4 kg)   20 0330 259 lb (117.5 kg)   20 1553 255 lb 14.4 oz (116.1 kg)     Body mass index is 32.66 kg/m². EXAM:  CONSTITUTIONAL:  No acute distress. Pleasant  HEENT:  Head is normocephalic, Extraocular movement intact. Neck is supple. Voice is clear. CARDIOVASCULAR:  S1, S2  regular rate and rhythm. RESPIRATORY: Clear to ausculation bilaterally. Equal breath sounds. No wheezes. No shortness of breath noted at rest.  ABDOMEN: soft, non tender  NEUROLOGICAL: Patient is alert and oriented to person, place, and time.  Recent and remote memory Lasix and no high-grade obstruction. Marked caliectasis. 4. Prompt uptake, excretion and clearance of radiotracer of the left kidney. Pelvocaliectasis without obstruction. Us Renal Complete  Result Date: 9/26/2020  1. No acute findings. No hydronephrosis. 2. Solitary benign-appearing cyst in each kidney. 3. Small Angiomyolipoma left kidney    ASSESSMENT:  · Acute Kidney Injury likely 2nd to obstructive uropathy requiring Hemodialysis, Improving   · Chronic Kidney Disease Stage II, previous creatinine 1.0-1.2  · Post obstructive diuresis compounded by large PO fluid intake. Discussed drinking a little less fluids  · Hypokalemia 2nd to large urinary loss, 40 meq daily  · Anion gap metabolic acidosis likely 2nd to SCOTTIE and tissue hypoxia from hypotension, Give Na Bicarb 1300 mg bid x 2 today  · Hypophosphatemia 2nd to urinary loss. Replace with K phos 20 mmols,   · Hypomag 2nd to urinary loss, Replace with 1 gm IVPB  · Proteinuria 2.4 gm   · Hx BPH, aponte, flomax  · Essential Hypertension, BP running low. Stop Amlodipine. Active Problems:    SCOTTIE (acute kidney injury) ([address]    Urinary retention    Enlarged prostate    Fatigue    Hyperkalemia    Accelerated hypertension  Resolved Problems:    * No resolved hospital problems.  RUTH Deras - CNP 9:17 AM 9/29/2020

## 2020-09-30 LAB
ANION GAP SERPL CALCULATED.3IONS-SCNC: 12 MEQ/L (ref 8–16)
BUN BLDV-MCNC: 11 MG/DL (ref 7–22)
CALCIUM IONIZED: 1.05 MMOL/L (ref 1.12–1.32)
CALCIUM SERPL-MCNC: 8.1 MG/DL (ref 8.5–10.5)
CHLORIDE BLD-SCNC: 103 MEQ/L (ref 98–111)
CO2: 26 MEQ/L (ref 23–33)
CREAT SERPL-MCNC: 1.3 MG/DL (ref 0.4–1.2)
ERYTHROCYTE [DISTWIDTH] IN BLOOD BY AUTOMATED COUNT: 13.8 % (ref 11.5–14.5)
ERYTHROCYTE [DISTWIDTH] IN BLOOD BY AUTOMATED COUNT: 47.5 FL (ref 35–45)
GLUCOSE BLD-MCNC: 125 MG/DL (ref 70–108)
GLUCOSE BLD-MCNC: 128 MG/DL (ref 70–108)
GLUCOSE BLD-MCNC: 131 MG/DL (ref 70–108)
GLUCOSE BLD-MCNC: 134 MG/DL (ref 70–108)
GLUCOSE BLD-MCNC: 229 MG/DL (ref 70–108)
HCT VFR BLD CALC: 39.3 % (ref 42–52)
HEMOGLOBIN: 12.8 GM/DL (ref 14–18)
MAGNESIUM: 1.8 MG/DL (ref 1.6–2.4)
MCH RBC QN AUTO: 30.8 PG (ref 26–33)
MCHC RBC AUTO-ENTMCNC: 32.6 GM/DL (ref 32.2–35.5)
MCV RBC AUTO: 94.7 FL (ref 80–94)
PHOSPHORUS: 2.1 MG/DL (ref 2.4–4.7)
PLATELET # BLD: 159 THOU/MM3 (ref 130–400)
PMV BLD AUTO: 10.8 FL (ref 9.4–12.4)
POTASSIUM REFLEX MAGNESIUM: 3.3 MEQ/L (ref 3.5–5.2)
RBC # BLD: 4.15 MILL/MM3 (ref 4.7–6.1)
SODIUM BLD-SCNC: 141 MEQ/L (ref 135–145)
WBC # BLD: 9.9 THOU/MM3 (ref 4.8–10.8)

## 2020-09-30 PROCEDURE — 82948 REAGENT STRIP/BLOOD GLUCOSE: CPT

## 2020-09-30 PROCEDURE — 83735 ASSAY OF MAGNESIUM: CPT

## 2020-09-30 PROCEDURE — 1200000003 HC TELEMETRY R&B

## 2020-09-30 PROCEDURE — 99233 SBSQ HOSP IP/OBS HIGH 50: CPT | Performed by: INTERNAL MEDICINE

## 2020-09-30 PROCEDURE — 2500000003 HC RX 250 WO HCPCS: Performed by: INTERNAL MEDICINE

## 2020-09-30 PROCEDURE — 94760 N-INVAS EAR/PLS OXIMETRY 1: CPT

## 2020-09-30 PROCEDURE — 2580000003 HC RX 258: Performed by: INTERNAL MEDICINE

## 2020-09-30 PROCEDURE — 99232 SBSQ HOSP IP/OBS MODERATE 35: CPT | Performed by: INTERNAL MEDICINE

## 2020-09-30 PROCEDURE — 6370000000 HC RX 637 (ALT 250 FOR IP): Performed by: INTERNAL MEDICINE

## 2020-09-30 PROCEDURE — 85027 COMPLETE CBC AUTOMATED: CPT

## 2020-09-30 PROCEDURE — 2580000003 HC RX 258: Performed by: NURSE PRACTITIONER

## 2020-09-30 PROCEDURE — 2500000003 HC RX 250 WO HCPCS: Performed by: NURSE PRACTITIONER

## 2020-09-30 PROCEDURE — 6360000002 HC RX W HCPCS: Performed by: NURSE PRACTITIONER

## 2020-09-30 PROCEDURE — 6370000000 HC RX 637 (ALT 250 FOR IP): Performed by: NURSE PRACTITIONER

## 2020-09-30 PROCEDURE — 82330 ASSAY OF CALCIUM: CPT

## 2020-09-30 PROCEDURE — 80048 BASIC METABOLIC PNL TOTAL CA: CPT

## 2020-09-30 PROCEDURE — 84100 ASSAY OF PHOSPHORUS: CPT

## 2020-09-30 PROCEDURE — 36415 COLL VENOUS BLD VENIPUNCTURE: CPT

## 2020-09-30 RX ORDER — SODIUM CHLORIDE, SODIUM LACTATE, POTASSIUM CHLORIDE, CALCIUM CHLORIDE 600; 310; 30; 20 MG/100ML; MG/100ML; MG/100ML; MG/100ML
INJECTION, SOLUTION INTRAVENOUS CONTINUOUS
Status: DISCONTINUED | OUTPATIENT
Start: 2020-09-30 | End: 2020-10-02

## 2020-09-30 RX ADMIN — HEPARIN SODIUM 5000 UNITS: 5000 INJECTION INTRAVENOUS; SUBCUTANEOUS at 19:30

## 2020-09-30 RX ADMIN — INSULIN LISPRO 1 UNITS: 100 INJECTION, SOLUTION INTRAVENOUS; SUBCUTANEOUS at 19:30

## 2020-09-30 RX ADMIN — POLYETHYLENE GLYCOL 3350 17 G: 17 POWDER, FOR SOLUTION ORAL at 08:40

## 2020-09-30 RX ADMIN — FAMOTIDINE 20 MG: 10 INJECTION INTRAVENOUS at 08:39

## 2020-09-30 RX ADMIN — HEPARIN SODIUM 5000 UNITS: 5000 INJECTION INTRAVENOUS; SUBCUTANEOUS at 12:15

## 2020-09-30 RX ADMIN — ATORVASTATIN CALCIUM 20 MG: 20 TABLET, FILM COATED ORAL at 19:30

## 2020-09-30 RX ADMIN — POTASSIUM PHOSPHATE, MONOBASIC AND POTASSIUM PHOSPHATE, DIBASIC 15 MMOL: 224; 236 INJECTION, SOLUTION, CONCENTRATE INTRAVENOUS at 09:33

## 2020-09-30 RX ADMIN — HEPARIN SODIUM 5000 UNITS: 5000 INJECTION INTRAVENOUS; SUBCUTANEOUS at 05:51

## 2020-09-30 RX ADMIN — TAMSULOSIN HYDROCHLORIDE 0.4 MG: 0.4 CAPSULE ORAL at 08:39

## 2020-09-30 RX ADMIN — ASPIRIN 81 MG: 81 TABLET ORAL at 08:39

## 2020-09-30 RX ADMIN — SODIUM CHLORIDE, POTASSIUM CHLORIDE, SODIUM LACTATE AND CALCIUM CHLORIDE: 600; 310; 30; 20 INJECTION, SOLUTION INTRAVENOUS at 19:31

## 2020-09-30 RX ADMIN — SODIUM CHLORIDE, PRESERVATIVE FREE 10 ML: 5 INJECTION INTRAVENOUS at 08:40

## 2020-09-30 RX ADMIN — SODIUM CHLORIDE, POTASSIUM CHLORIDE, SODIUM LACTATE AND CALCIUM CHLORIDE: 600; 310; 30; 20 INJECTION, SOLUTION INTRAVENOUS at 12:18

## 2020-09-30 RX ADMIN — POTASSIUM BICARBONATE 40 MEQ: 782 TABLET, EFFERVESCENT ORAL at 08:40

## 2020-09-30 RX ADMIN — THERA TABS 1 TABLET: TAB at 08:39

## 2020-09-30 ASSESSMENT — PAIN SCALES - GENERAL: PAINLEVEL_OUTOF10: 0

## 2020-09-30 NOTE — CARE COORDINATION
9/30/20, 10:49 AM EDT    Anticipating discharge today. Plans home alone with support from son. Garcia will remain in placed for one week at discharge, follow-up with urology scheduled. Denies needs. Patient goals/plan/ treatment preferences discussed by  and . Patient goals/plan/ treatment preferences reviewed with patient/ family. Patient/ family verbalize understanding of discharge plan and are in agreement with goal/plan/treatment preferences. Understanding was demonstrated using the teach back method. AVS provided by RN at time of discharge, which includes all necessary medical information pertaining to the patients current course of illness, treatment, post-discharge goals of care, and treatment preferences.

## 2020-09-30 NOTE — PROGRESS NOTES
Hospitalist Progress Note      Patient:  Patti Cordero    Unit/Bed:6K-10/010-A  YOB: 1960  MRN: [id number]   Acct: [de-identified]   PCP: Tess Fragoso DO  Date of Admission: 9/25/2020    Assessment/Plan:    1. Acute Urinary Retention secondary to outlet obstruction most probably from prostate enlargement:  S/P Garcia catheter placement.  Hold and avoid any anticholinergic medications.  Urology following.  Strict hourly intake and output.  PSA 24.02. Urology plans to remove voiding after discharge. 2. SCOTTIE on CKD, Stage II with Associated Hyperkalemia:  secondary to #1.  Dramatic improvement in kidney function test after Garcia catheter insertion with with urine output 17.1 L over 2 days. Continue potassium replacement, urology and nephrology following.    3. Post-Obstructive Diuresis: urine output 17.1 L over 2 days and net intake output last 2 days was -8.8 L  Monitor, electrolyte & replacement, fluid replacement.    4. Hematuria: Improving, secondary to #1. Urology following.    5. History BPH/Prostatitis: Urology following.    6. Accelerated Hypertension: Patient responded well to IVF.  Lopressor IV available. Continue Norvasc.    7. High Anion Gap Metabolic Acidosis: Secondary to SCOTTIE and tissue hypoxia from hypotension, give sodium bicarb 13 mg twice daily per nephrology recommendations. 8. Normocytic Anemia:  Hemoglobin 12.7; baseline Hg appx. 15.  Dilutional effect.  Monitor hematuria and trend CBC.  Continue multivitamin.     9. BPH/History Prostatitis:  Not on any alpha-1-adrernergic antagonists outpatient.  Consult to Urology as above. 10. Constipation:  KUB performed on 9/26/2020 demonstrates nonobstructive bowel gas pattern .  Continue Stool softeners.       Chief Complaint: Shortness of Breath    Initial H and P:-    **Per Chart Review:  \"62year-old -American male, never smoker, past medical history hypertension, hyperlipidemia, impaired fasting glucose, elevated PSA, anxiety, prostatitis, BPH who presented to [address] [place], [place] [address] on 9/25/2020 reporting progressive fatigue, sleeping too much, and dyspnea ongoing for two weeks.  Patient did see his PCP on 9/22/2020 and was diagnosed with anxiety/depression. Karlee Reveles has not been able to work due to [employer] works at "[employer] Lokesh Anglin also had urinary frequency with decreased amount and then decreased urine output altogether. Karlee Reveles noticed his pants were getting tighter.  The patient has not been taking his prescribed medications.  Garcia catheter was placed in the emergency department and return of 1700 mL's of urine shortly and then per notes other 2200 mL's in the emergency department which was initially yellow than blood tinged.  Patient was taken to Interventional Radiology for placement of temporary hemodialysis catheter per Dr. Lety Goldberg and subsequently admitted to the Intensive Care unit for further monitoring. \"    Subjective (past 24 hours):   Patient resting in bed at the time of the interview. Currently denies any physical complaints and was updated on the plan of care. Patient was advised that nephrology will continue to work on stabilizing his renal performance and he verbalizes understand the plan of care. He had no other needs or questions at this time. Past medical history, family history, social history and allergies reviewed again and is unchanged since admission. ROS (14 point review of systems completed. Pertinent positives noted. Otherwise ROS is negative) :  GENERAL: No fever,chills, or night sweats. SKIN: No lesions or rashes. HEAD: No headaches or recent injury. EYES: No acute changes in vision, no diplopia or blurred vision. EARS: No hearing loss, no tinnitus. NOSE/THROAT: No rhinorrhea or pharyngitis, no nasal drainage. NECK: No lumps or unusual neck stiffness.   PULMONARY: Respirations easy and non-labored, no acute midline. Respiratory:  Normal respiratory effort. Clear to auscultation, bilaterally without Rales/Wheezes/Rhonchi. Cardiovascular: Regular rate and rhythm with normal S1/S2 without murmurs, rubs or gallops. Abdomen: Soft, non-tender, non-distended with normal bowel sounds. Musculoskeletal: Passive and active ROM x 4 extremities. Skin: Skin color, texture, turgor normal.  No rashes or lesions. Neurologic:  Neurovascularly intact without any focal sensory/motor deficits. Cranial nerves: II-XII intact, grossly non-focal.  Psychiatric: Alert and oriented to person, place, time, and situation. Thought content appropriate, normal insight  Capillary Refill: Brisk,< 3 seconds   Peripheral Pulses: +2 palpable, equal bilaterally    Labs:   Recent Labs     09/27/20  0425 09/29/20  1724   WBC 8.3 12.4*   HGB 12.8* 12.4*   HCT 38.1* 38.2*   * 148     Recent Labs     09/28/20  0616 09/28/20  1852 09/29/20  0622    135 140   K 3.4* 3.1* 3.3*    98 102   CO2 27 25 20*   BUN 12 15 12   CREATININE 1.7* 1.9* 1.5*   CALCIUM 8.4* 7.5* 8.1*   PHOS 1.9*  --  1.9*     No results for input(s): AST, ALT, BILIDIR, BILITOT, ALKPHOS in the last 72 hours. No results for input(s): INR in the last 72 hours. No results for input(s): Rochelle Lager in the last 72 hours.     Microbiology:    Blood culture #1:   Lab Results   Component Value Date    BC No growth-preliminary  09/25/2020       Blood culture #2:No results found for: Dustin Galileo    Organism:No results found for: ORG    No results found for: LABGRAM    MRSA culture only:No results found for: [facility]    Urine culture:   Lab Results   Component Value Date    LABURIN No growth-preliminary No growth  09/04/2020       Respiratory culture: No results found for: CULTRESP    Aerobic and Anaerobic :  No results found for: LABAERO  No results found for: LABANAE    Urinalysis:      Lab Results   Component Value Date    NITRU NEGATIVE 09/25/2020    WBCUA 0-2 09/25/2020 BACTERIA NONE SEEN 09/25/2020    RBCUA 0-2 09/25/2020    BLOODU TRACE 09/25/2020    SPECGRAV 1.006 09/04/2020    GLUCOSEU NEGATIVE 09/25/2020       Radiology:  NM KIDNEY W FLOW AND FUNCTION W PHARMACOLOGICAL INTERVENTION   Final Result   1. Prompt and symmetric perfusion to the bilateral kidneys. 2. Differential renal function of 53% for the left kidney and 47% for the right kidney. 3. Delayed clearance of radiotracer from the right kidney with clearance after Lasix and no high-grade obstruction. Marked caliectasis. 4. Prompt uptake, excretion and clearance of radiotracer of the left kidney. Pelvocaliectasis without obstruction. **This report has been created using voice recognition software. It may contain minor errors which are inherent in voice recognition technology. **      Final report electronically signed by Dr. Jennifer Mcmahon on 9/28/2020 9:28 AM      XR ABDOMEN (KUB) (SINGLE AP VIEW)   Final Result   Nonobstructive bowel gas pattern. **This report has been created using voice recognition software. It may contain minor errors which are inherent in voice recognition technology. **      Final report electronically signed by Dr Azam Allison on 9/26/2020 9:06 AM      US RENAL COMPLETE   Final Result   1. No acute findings. No hydronephrosis. 2. Solitary benign-appearing cyst in each kidney. 3. Small Angiomyolipoma left kidney. **This report has been created using voice recognition software. It may contain minor errors which are inherent in voice recognition technology. **      Final report electronically signed by Dr. Sandra Leach on 9/26/2020 4:00 AM      IR FLUORO GUIDED CVA DEVICE PLMT/REPLACE/REMOVAL   Final Result   Status post successful nontunneled dialysis catheter insertion. **This report has been created using voice recognition software. It may contain minor errors which are inherent in voice recognition technology. **      Final report HYDRONEPHROSIS:  There is no hydronephrosis. CALCULI:  No calculi are seen. BLADDER:  Bladder empty with Garcia catheter in place. .     1. No acute findings. No hydronephrosis. 2. Solitary benign-appearing cyst in each kidney. 3. Small Angiomyolipoma left kidney. **This report has been created using voice recognition software. It may contain minor errors which are inherent in voice recognition technology. ** Final report electronically signed by Dr. Mallika Mederos on 9/26/2020 4:00 AM    oN Geovani Ceja Cva Device Plmt/replace/removal    Result Date: 9/26/2020  NON-TUNNELED DIALYSIS CATHETER INSERTION: CLINICAL INFORMATION: 47-year-old male with renal failure. PERFORMED BY: Dr. Radha Parada M.D. APPROACH : Right Internal Jugular Vein, ultrasound guidance, micropuncture technique. DIALYSIS CATHETER:  14 Indonesian Hemocath, 20 cm in length. DIALYSIS CATHETER TIP:  Right [facility] FLUOROSCOPY TIME: 48 seconds FLUOROSCOPIC IMAGES: 1 PROCEDURE:  Signed informed consent was obtained prior to performing this procedure. The patient was not sedated during this procedure but was monitored with EKG and pulse-ox monitoring devices by a registered nurse. Following local anesthesia and utilizing MAXIMAL STERILE BARRIER TECHNIQUE, the vein listed above was punctured with a micropuncture needle, utilizing ultrasound guidance, an image was obtained confirming needle position and vessel patency. .  A .018 wire  was passed through this needle, followed by insertion of a 4 Western Maribell dilator. Following guide wire and catheter exchange, a percutaneous tract was dilated to a 14 Western Maribell size. Then, the temporary dialysis catheter was advanced over an .035 guide wire, with fluoroscopic guidance, with the tip at the location as specified above. This was all performed with fluoroscopic guidance. The hub of the catheter was then sutured to the skin with 3-0 silk suture.  An antibacterial Biopatch was applied to the catheter exit site along with a sterile OpSite dressing. Status post successful nontunneled dialysis catheter insertion. **This report has been created using voice recognition software. It may contain minor errors which are inherent in voice recognition technology. ** Final report electronically signed by Dr Jean-Pierre De La Rosa on 9/26/2020 7:12 AM    Xr Chest Portable    Result Date: 9/25/2020  PROCEDURE: XR CHEST PORTABLE CLINICAL INFORMATION: sob. COMPARISON: No prior study. TECHNIQUE: Portable FINDINGS: No lobar consolidation. Costophrenic angles are preserved. Cardiomegaly. No acute osseous findings. No acute findings **This report has been created using voice recognition software. It may contain minor errors which are inherent in voice recognition technology. ** Final report electronically signed by Dr. Harsha Alves on 9/25/2020 7:27 PM      **This report has been created using voice recognition software. It may contain minor errors which are inherent in voice recognition technology. **  Electronically signed by RUTH Suarez CNP on 9/29/2020 at 10:24 PM

## 2020-09-30 NOTE — PROGRESS NOTES
Hospitalist Progress Note      Patient:  Raul Chance    Unit/Bed:6K-10/010-A  YOB: 1960  MRN: [id number]   Acct: [de-identified]   PCP: Jay Jay Pires DO  Date of Admission: 9/25/2020    Assessment/Plan:    1. Acute Urinary Retention secondary to outlet obstruction most probably from prostate enlargement:  S/P Garcia catheter placement.  Hold and avoid any anticholinergic medications.  Urology following.  Strict hourly intake and output.  PSA 24.02. Urology plans to remove voiding after discharge. 2. SCOTTIE on CKD, Stage II with Associated Hyperkalemia:  secondary to #1.  Dramatic improvement in kidney function test after Garcia catheter insertion with with urine output 17.1 L over 2 days. Continue potassium replacement, urology and nephrology following.    3. Post-Obstructive Diuresis: Patient is net -17 L this admission and continues to have brisk urine output. He is little bit tachycardic and appears dry. We will continue IV fluids until postobstructive diuresis resolves. 4. Hematuria: Improving, secondary to #1. Urology following.    5. History BPH/Prostatitis: Urology following.    6. Accelerated Hypertension: Patient responded well to IVF.  Lopressor IV available. Continue Norvasc.    7. High Anion Gap Metabolic Acidosis: Secondary to SCOTTIE and tissue hypoxia from hypotension, give sodium bicarb 13 mg twice daily per nephrology recommendations. 8. Normocytic Anemia:  Hemoglobin 12.7; baseline Hg appx. 15.  Dilutional effect.  Monitor hematuria and trend CBC.  Continue multivitamin.     9. BPH/History Prostatitis:  Not on any alpha-1-adrernergic antagonists outpatient.  Consult to Urology as above. 10. Constipation:  KUB performed on 9/26/2020 demonstrates nonobstructive bowel gas pattern .  Continue Stool softeners.       Chief Complaint: Shortness of Breath    Initial H and P:-    **Per Chart Review:  \"62year-old -American male, never smoker, past medical history hypertension, hyperlipidemia, impaired fasting glucose, elevated PSA, anxiety, prostatitis, BPH [facility] reporting progressive fatigue, sleeping too much, and dyspnea ongoing for two weeks.  Patient did see his PCP on 9/22/2020 and was diagnosed with anxiety/depression. Santo Bueno has not been able to work due to [employer] works at [employer] Felisa Martinez also had urinary frequency with decreased amount and then decreased urine output altogether. Santo Bueno noticed his pants were getting tighter.  The patient has not been taking his prescribed medications.  Garcia catheter was placed in the emergency department and return of 1700 mL's of urine shortly and then per notes other 2200 mL's in the emergency department which was initially yellow than blood tinged.  Patient was taken to Interventional Radiology for placement of temporary hemodialysis catheter per Dr. Pauline Loco and subsequently admitted to the Intensive Care unit for further monitoring. \"    Subjective (past 24 hours):   Having copious urine output likely related to postobstructive diuresis. Patient is tachycardic and appears dry. Continue IV fluids. Patient endorses having a dry mouth and feeling skinny. Past medical history, family history, social history and allergies reviewed again and is unchanged since admission. ROS (14 point review of systems completed. Pertinent positives noted. Otherwise ROS is negative) :  GENERAL: No fever,chills, or night sweats. SKIN: No lesions or rashes. HEAD: No headaches or recent injury. EYES: No acute changes in vision, no diplopia or blurred vision. EARS: No hearing loss, no tinnitus. NOSE/THROAT: No rhinorrhea or pharyngitis, no nasal drainage. NECK: No lumps or unusual neck stiffness. PULMONARY: Respirations easy and non-labored, no acute distress. CARDIAC: No chest pain, pressure, Negative for lower leg edema.   GI: Abdomen is soft and non-tender, non-distended. PERIPHERAL VASCULAR: No intermittent claudication or unusual leg cramps. MUSCULOSKELETAL: Occasional arthralgias, myalgias. NEUROLOGICAL: Denies any headache, near syncope, seizures or syncope. HEMATOLOGIC:  No unusual bruising or bleeding. PSYCH: Denies any homicidal or suicidial ideations. Medications:  Reviewed    Infusion Medications    lactated ringers 125 mL/hr at 09/30/20 1218    dextrose       Scheduled Medications    tamsulosin  0.4 mg Oral Daily    potassium bicarb-citric acid  40 mEq Oral Daily    sodium chloride flush  10 mL Intravenous 2 times per day    polyethylene glycol  17 g Oral Daily    heparin (porcine)  5,000 Units Subcutaneous 3 times per day    famotidine (PEPCID) injection  20 mg Intravenous Daily    potassium replacement protocol   Other RX Placeholder    aspirin  81 mg Oral Daily    atorvastatin  20 mg Oral Nightly    multivitamin  1 tablet Oral Daily    insulin lispro  0-6 Units Subcutaneous TID WC    insulin lispro  0-3 Units Subcutaneous Nightly     PRN Meds: sodium chloride flush, acetaminophen **OR** acetaminophen, promethazine **OR** ondansetron, glucose, dextrose, glucagon (rDNA), dextrose, [Held by provider] opium-belladonna      Intake/Output Summary (Last 24 hours) at 9/30/2020 1845  Last data filed at 9/30/2020 1821  Gross per 24 hour   Intake 1589 ml   Output 4725 ml   Net -3136 ml       Diet:  DIET GENERAL; No Added Salt (3-4 GM)    Exam:  /60   Pulse 108   Temp 98.2 °F (36.8 °C) (Oral)   Resp 16   Ht 6' 2\" (1.88 m)   Wt 253 lb 8 oz (115 kg)   SpO2 96%   BMI 32.55 kg/m²     General appearance: Alert and appropriate, pleasant adult male. No apparent distress, appears stated age and cooperative. HEENT: Pupils equal, round, and reactive to light. Conjunctivae/corneas clear. Neck: Supple, with full range of motion. No jugular venous distention. Trachea midline. Respiratory:  Normal respiratory effort.  Clear to auscultation, bilaterally without Rales/Wheezes/Rhonchi. Cardiovascular:  Tachycardic rate with regular rhythm no murmurs rubs or gallops  Abdomen: Soft, non-tender, non-distended with normal bowel sounds. Musculoskeletal: Passive and active ROM x 4 extremities. Skin: Skin color, texture, turgor normal.  No rashes or lesions. Neurologic:  Neurovascularly intact without any focal sensory/motor deficits. Cranial nerves: II-XII intact, grossly non-focal.  Psychiatric: Alert and oriented to person, place, time, and situation. Thought content appropriate, normal insight  Capillary Refill: Brisk,< 3 seconds   Peripheral Pulses: +2 palpable, equal bilaterally    Labs:   Recent Labs     09/29/20  1724 09/30/20  0619   WBC 12.4* 9.9   HGB 12.4* 12.8*   HCT 38.2* 39.3*    159     Recent Labs     09/28/20  0616 09/28/20  1852 09/29/20  0622 09/30/20  0619    135 140 141   K 3.4* 3.1* 3.3* 3.3*    98 102 103   CO2 27 25 20* 26   BUN 12 15 12 11   CREATININE 1.7* 1.9* 1.5* 1.3*   CALCIUM 8.4* 7.5* 8.1* 8.1*   PHOS 1.9*  --  1.9* 2.1*     No results for input(s): AST, ALT, BILIDIR, BILITOT, ALKPHOS in the last 72 hours. No results for input(s): INR in the last 72 hours. No results for input(s): Khoi Hug in the last 72 hours.     Microbiology:    Blood culture #1:   Lab Results   Component Value Date    BC No growth-preliminary  09/25/2020       Blood culture #2:No results found for: Tip Sanchez    Organism:No results found for: ORG    No results found for: LABGRAM    MRSA culture only:No results found for: [facility]    Urine culture:   Lab Results   Component Value Date    LABURIN No growth-preliminary No growth  09/04/2020       Respiratory culture: No results found for: CULTRESP    Aerobic and Anaerobic :  No results found for: LABAERO  No results found for: LABANAE    Urinalysis:      Lab Results   Component Value Date    NITRU NEGATIVE 09/25/2020    WBCUA 0-2 09/25/2020    BACTERIA NONE SEEN 09/25/2020    RBCUA 0-2 09/25/2020    BLOODU TRACE 09/25/2020    SPECGRAV 1.006 09/04/2020    GLUCOSEU NEGATIVE 09/25/2020       Radiology:  NM KIDNEY W FLOW AND FUNCTION W PHARMACOLOGICAL INTERVENTION   Final Result   1. Prompt and symmetric perfusion to the bilateral kidneys. 2. Differential renal function of 53% for the left kidney and 47% for the right kidney. 3. Delayed clearance of radiotracer from the right kidney with clearance after Lasix and no high-grade obstruction. Marked caliectasis. 4. Prompt uptake, excretion and clearance of radiotracer of the left kidney. Pelvocaliectasis without obstruction. **This report has been created using voice recognition software. It may contain minor errors which are inherent in voice recognition technology. **      Final report electronically signed by Dr. Homero Segura on 9/28/2020 9:28 AM      XR ABDOMEN (KUB) (SINGLE AP VIEW)   Final Result   Nonobstructive bowel gas pattern. **This report has been created using voice recognition software. It may contain minor errors which are inherent in voice recognition technology. **      Final report electronically signed by Dr Paul Munroe on 9/26/2020 9:06 AM      US RENAL COMPLETE   Final Result   1. No acute findings. No hydronephrosis. 2. Solitary benign-appearing cyst in each kidney. 3. Small Angiomyolipoma left kidney. **This report has been created using voice recognition software. It may contain minor errors which are inherent in voice recognition technology. **      Final report electronically signed by Dr. Suraj Blanchard on 9/26/2020 4:00 AM      IR FLUORO GUIDED CVA DEVICE PLMT/REPLACE/REMOVAL   Final Result   Status post successful nontunneled dialysis catheter insertion. **This report has been created using voice recognition software. It may contain minor errors which are inherent in voice recognition technology. **      Final report electronically signed by Dr Ankit Vázquez on 9/26/2020 7:12 AM      XR CHEST PORTABLE   Final Result   No acute findings               **This report has been created using voice recognition software. It may contain minor errors which are inherent in voice recognition technology. **      Final report electronically signed by Dr. Michaele Castleman on 9/25/2020 7:27 PM        Xr Abdomen (kub) (single Ap View)    Result Date: 9/26/2020  PROCEDURE: XR ABDOMEN (KUB) (SINGLE AP VIEW) CLINICAL INFORMATION: 64-year-old male with abdominal pain and constipation. COMPARISON: No prior study. TECHNIQUE: 2 AP views of the abdomen were obtained in the supine position. FINDINGS: There are no distended bowel loops. There are no displaced bowel loops. There is no gross free air. No suspicious densities are present. Some degenerative changes are seen in the spine. Nonobstructive bowel gas pattern. **This report has been created using voice recognition software. It may contain minor errors which are inherent in voice recognition technology. ** Final report electronically signed by Dr Ankit Vázquez on 9/26/2020 9:06 AM    Us Renal Complete    Result Date: 9/26/2020  BILATERAL RENAL ULTRASOUND: CLINICAL INFORMATION: post obstructive. Severe acute renal injury COMPARISON: No comparison available. TECHNIQUE: Multiple sonographic images of both kidneys were obtained. Images of the urinary bladder were also obtained. FINDINGS: RIGHT KIDNEY - 12.6 x 7.3 x 6.8 cm Resistive Index - 0.58 Cortical Thickness - 0.96 cm LEFT KIDNEY - 12.7 x 7.8 x 6.2 cm Resistive Index - 0.57 Cortical Thickness - 0.91 cm URINARY BLADDER Pre-Void - aponte Post-Void - aponte  KIDNEYS:  Both kidneys are normal in size and contour. The resistive indices are normal.  MASS/CYST: There is a benign-appearing 5.2 cm cyst mid body right kidney. 1.8 cm hyperechoic lesion mid body left kidney, likely an angiomyolipoma. Small benign-appearing 15 mm cyst mid body left kidney. HYDRONEPHROSIS:  There is no hydronephrosis. CALCULI:  No calculi are seen. BLADDER:  Bladder empty with Garcia catheter in place. .     1. No acute findings. No hydronephrosis. 2. Solitary benign-appearing cyst in each kidney. 3. Small Angiomyolipoma left kidney. **This report has been created using voice recognition software. It may contain minor errors which are inherent in voice recognition technology. ** Final report electronically signed by Dr. Hogan Both on 9/26/2020 4:00 AM    No Agatha Escobar Cva Device Plmt/replace/removal    Result Date: 9/26/2020  NON-TUNNELED DIALYSIS CATHETER INSERTION: CLINICAL INFORMATION: 42-year-old male with renal failure. PERFORMED BY: Dr. Elizabeth Freedman M.D. APPROACH : Right Internal Jugular Vein, ultrasound guidance, micropuncture technique. DIALYSIS CATHETER:  14 Romanian Hemocath, 20 cm in length. DIALYSIS CATHETER TIP:  Right [facility] FLUOROSCOPY TIME: 48 seconds FLUOROSCOPIC IMAGES: 1 PROCEDURE:  Signed informed consent was obtained prior to performing this procedure. The patient was not sedated during this procedure but was monitored with EKG and pulse-ox monitoring devices by a registered nurse. Following local anesthesia and utilizing MAXIMAL STERILE BARRIER TECHNIQUE, the vein listed above was punctured with a micropuncture needle, utilizing ultrasound guidance, an image was obtained confirming needle position and vessel patency. .  A .018 wire  was passed through this needle, followed by insertion of a 4 Western Maribell dilator. Following guide wire and catheter exchange, a percutaneous tract was dilated to a 14 Western Maribell size. Then, the temporary dialysis catheter was advanced over an .035 guide wire, with fluoroscopic guidance, with the tip at the location as specified above. This was all performed with fluoroscopic guidance. The hub of the catheter was then sutured to the skin with 3-0 silk suture.  An antibacterial Biopatch was applied to the catheter exit site along with a sterile OpSite dressing. Status post successful nontunneled dialysis catheter insertion. **This report has been created using voice recognition software. It may contain minor errors which are inherent in voice recognition technology. ** Final report electronically signed by Dr Piero Hirsch on 9/26/2020 7:12 AM    Xr Chest Portable    Result Date: 9/25/2020  PROCEDURE: XR CHEST PORTABLE CLINICAL INFORMATION: sob. COMPARISON: No prior study. TECHNIQUE: Portable FINDINGS: No lobar consolidation. Costophrenic angles are preserved. Cardiomegaly. No acute osseous findings. No acute findings **This report has been created using voice recognition software. It may contain minor errors which are inherent in voice recognition technology. ** Final report electronically signed by Dr. Winsome Rizzo on 9/25/2020 7:27 PM      **This report has been created using voice recognition software. It may contain minor errors which are inherent in voice recognition technology. **  Electronically signed by Nemo Banegas MD on 9/30/2020 at 6:45 PM

## 2020-09-30 NOTE — ED PROVIDER NOTES
[facility] Emergency Department    CHIEF COMPLAINT       Chief Complaint   Patient presents with    Shortness of Breath       Nurses Notes reviewed and I agree except as noted in the HPI. HISTORY OF PRESENT ILLNESS    Deborra Risk terra 62 y.o. male who presents to the ED for evaluation of fatigue. It has gotten worse over the last two days. Patient is sleeping a lot during the day. States he is up because he always has to urinate but he hasn't been getting a lot out. He denies n/v/d/, fever. Just states that he feels so run down and doesn't feel good. Symptoms are very vague. Does state that he was treated recently for prostatitis. HPI was provided by the patient. REVIEW OF SYSTEMS     Review of Systems   Constitutional: Positive for activity change and fatigue. Negative for chills and fever. HENT: Negative for congestion, ear discharge, ear pain, postnasal drip and rhinorrhea. Respiratory: Negative for cough and chest tightness. Cardiovascular: Positive for leg swelling. Negative for chest pain and palpitations. Gastrointestinal: Negative for abdominal pain, nausea and vomiting. Genitourinary: Positive for decreased urine volume, difficulty urinating, dysuria, frequency and urgency. Negative for enuresis, flank pain and hematuria. Musculoskeletal: Negative for back pain, joint swelling and myalgias. Skin: Negative for rash. Neurological: Negative for dizziness, light-headedness, numbness and headaches. Psychiatric/Behavioral: Positive for decreased concentration. Negative for agitation, behavioral problems and confusion. PAST MEDICAL HISTORY     Past Medical History:   Diagnosis Date    Acute kidney injury ([facility] [address] 9/25/2020    Elevated PSA     Hyperlipidemia LDL goal < 100 2/26/2013    Hypertension        SURGICALHISTORY      has a past surgical history that includes Achilles tendon surgery; Corneal transplant (7/2013); Prostate surgery (2-4-2014);  Eye surgery (2014); and pr egd flexible foreign body removal (Left, 1/18/2018). CURRENT MEDICATIONS       Current Discharge Medication List      CONTINUE these medications which have NOT CHANGED    Details   amLODIPine (NORVASC) 5 MG tablet TAKE 1 TABLET BY MOUTH ONE TIME A DAY  Qty: 90 tablet, Refills: 3      atorvastatin (LIPITOR) 20 MG tablet TAKE 1 TABLET BY MOUTH ONE TIME A DAY   Qty: 90 tablet, Refills: 3      aspirin 81 MG tablet Take 81 mg by mouth daily      PrednisoLONE Acetate (PRED FORTE OP) Apply to eye      MULTIPLE VITAMIN PO Take  by mouth. ALLERGIES     is allergic to lisinopril and shellfish-derived products. FAMILY HISTORY     He indicated that his mother is alive. He indicated that his father is alive. He indicated that his sister is alive. He indicated that his brother is alive. He indicated that the status of his neg hx is unknown.   family history includes High Blood Pressure in his father.     SOCIAL HISTORY       Social History     Socioeconomic History    Marital status:      Spouse name: Not on file    Number of children: Not on file    Years of education: Not on file    Highest education level: Not on file   Occupational History    Not on file   Social Needs    Financial resource strain: Not on file    Food insecurity     Worry: Not on file     Inability: Not on file    Transportation needs     Medical: Not on file     Non-medical: Not on file   Tobacco Use    Smoking status: Never Smoker    Smokeless tobacco: Never Used   Substance and Sexual Activity    Alcohol use: Yes     Comment: 2 drinks a week    Drug use: No    Sexual activity: Not on file   Lifestyle    Physical activity     Days per week: Not on file     Minutes per session: Not on file    Stress: Not on file   Relationships    Social connections     Talks on phone: Not on file     Gets together: Not on file     Attends Judaism service: Not on file     Active member of club or organization: DIFFERENTIAL DIAGNOSIS:   Acs, CO2 retention, PNA, viral illness, UTI, Prostatitis, Sepsis. DIAGNOSTIC RESULTS     EKG: All EKG's are interpreted by the Emergency Department Physician who eithersigns or Co-signs this chart in the absence of a cardiologist.       EKG Emergency (Final result)    Component (Lab Inquiry)   Collection Time  Result Time  Ventricular Rate  Atrial Rate  P-R Interval  QRS Duration  Q-T Interval  QTc Calculation (Bazett)  P Axis  R Axis  T Axis    09/25/20 19:52:30  09/26/20 01:12:44  125  125  160  100  304  438  63  81  36    Previous Results    09/25/20 18:35:28  09/26/20 01:12:36  131  131  152  94  298  440  47  68  44    07/29/13 16:09:07  07/30/13 09:32:03  109  109  164  90  334  449  57  97  26           Final result                 Narrative:     Sinus tachycardia   Otherwise normal ECG   When compared with ECG of 25-SEP-2020 18:35,   No significant change was found   Confirmed by Theresa Chamberlain MD ([id number]) on 9/26/2020 1:12:43 AM                         EKG 12 Lead (Final result)    Component (Lab Inquiry)   Collection Time  Result Time  Ventricular Rate  Atrial Rate  P-R Interval  QRS Duration  Q-T Interval  QTc Calculation (Bazett)  P Axis  R Axis  T Axis    09/25/20 18:35:28  09/26/20 01:12:36  131  131  152  94  298  440  47  68  44    Previous Results    07/29/13 16:09:07  07/30/13 09:32:03  109  109  164  90  334  449  57  97  26           Final result                 Narrative:     Sinus tachycardia   Otherwise normal ECG   When compared with ECG of 29-JUL-2013 16:09,   No significant change was found   Confirmed by Theresa Chamberlain MD ([id number]) on 9/26/2020 1:12:36 AM                   RADIOLOGY: non-plainfilm images(s) such as CT, Ultrasound and MRI are read by the radiologist.  Plain radiographic images are visualized and preliminarily interpreted by the emergency physician unless otherwise stated below.   NM KIDNEY W FLOW AND FUNCTION W PHARMACOLOGICAL INTERVENTION Final Result   1. Prompt and symmetric perfusion to the bilateral kidneys. 2. Differential renal function of 53% for the left kidney and 47% for the right kidney. 3. Delayed clearance of radiotracer from the right kidney with clearance after Lasix and no high-grade obstruction. Marked caliectasis. 4. Prompt uptake, excretion and clearance of radiotracer of the left kidney. Pelvocaliectasis without obstruction. **This report has been created using voice recognition software. It may contain minor errors which are inherent in voice recognition technology. **      Final report electronically signed by Dr. Jossie Borja on 9/28/2020 9:28 AM      XR ABDOMEN (KUB) (SINGLE AP VIEW)   Final Result   Nonobstructive bowel gas pattern. **This report has been created using voice recognition software. It may contain minor errors which are inherent in voice recognition technology. **      Final report electronically signed by Dr Radha Parada on 9/26/2020 9:06 AM      US RENAL COMPLETE   Final Result   1. No acute findings. No hydronephrosis. 2. Solitary benign-appearing cyst in each kidney. 3. Small Angiomyolipoma left kidney. **This report has been created using voice recognition software. It may contain minor errors which are inherent in voice recognition technology. **      Final report electronically signed by Dr. Mallika Mederos on 9/26/2020 4:00 AM      IR FLUORO GUIDED CVA DEVICE PLMT/REPLACE/REMOVAL   Final Result   Status post successful nontunneled dialysis catheter insertion. **This report has been created using voice recognition software. It may contain minor errors which are inherent in voice recognition technology. **      Final report electronically signed by Dr Radha Parada on 9/26/2020 7:12 AM      XR CHEST PORTABLE   Final Result   No acute findings               **This report has been created using voice recognition software.  It may contain minor errors which are inherent in voice recognition technology. **      Final report electronically signed by Dr. Debbie Nassar on 9/25/2020 7:27 PM            LABS:   Labs Reviewed   BASIC METABOLIC PANEL - Abnormal; Notable for the following components:       Result Value    Sodium 149 (*)     Potassium 8.1 (*)     CO2 6 (*)      (*)     CREATININE 51.0 (*)     All other components within normal limits   CBC WITH AUTO DIFFERENTIAL - Abnormal; Notable for the following components:    RBC 4.13 (*)     Hemoglobin 12.7 (*)     Hematocrit 39.4 (*)     MCV 95.4 (*)     RDW-CV 14.7 (*)     RDW-SD 51.5 (*)     Segs Absolute 7.9 (*)     Lymphocytes Absolute 0.9 (*)     All other components within normal limits   LACTATE DEHYDROGENASE - Abnormal; Notable for the following components:     (*)     All other components within normal limits   LIPASE - Abnormal; Notable for the following components:    Lipase 71.9 (*)     All other components within normal limits   PROCALCITONIN - Abnormal; Notable for the following components:    Procalcitonin 0.14 (*)     All other components within normal limits   ANION GAP - Abnormal; Notable for the following components:    Anion Gap 35.0 (*)     All other components within normal limits   GLOMERULAR FILTRATION RATE, ESTIMATED - Abnormal; Notable for the following components:    Est, Glom Filt Rate 1 (*)     All other components within normal limits   OSMOLALITY - Abnormal; Notable for the following components:    Osmolality Calc 352.4 (*)     All other components within normal limits   BASIC METABOLIC PANEL W/ REFLEX TO MG FOR LOW K - Abnormal; Notable for the following components:    Potassium reflex Magnesium 8.1 (*)     CO2 7 (*)      (*)     CREATININE 49.5 (*)     All other components within normal limits   URINE WITH REFLEXED MICRO - Abnormal; Notable for the following components:    Ketones, Urine TRACE (*)     Blood, Urine TRACE (*)     All other components within the following components:    Anion Gap 23.0 (*)     All other components within normal limits   GLOMERULAR FILTRATION RATE, ESTIMATED - Abnormal; Notable for the following components:    Est, Glom Filt Rate 3 (*)     All other components within normal limits   BASIC METABOLIC PANEL - Abnormal; Notable for the following components:    Sodium 157 (*)     Chloride 120 (*)     CO2 16 (*)     BUN 77 (*)     CREATININE 13.1 (*)     All other components within normal limits   BASIC METABOLIC PANEL - Abnormal; Notable for the following components:    Sodium 152 (*)     Chloride 118 (*)     CO2 19 (*)     Glucose 287 (*)     BUN 50 (*)     CREATININE 6.6 (*)     All other components within normal limits   PSA PROSTATIC SPECIFIC ANTIGEN - Abnormal; Notable for the following components:    PSA 24.02 (*)     All other components within normal limits   BASIC METABOLIC PANEL - Abnormal; Notable for the following components:    Sodium 150 (*)     Potassium 3.4 (*)     Chloride 112 (*)     Glucose 229 (*)     BUN 35 (*)     CREATININE 4.2 (*)     All other components within normal limits   ANION GAP - Abnormal; Notable for the following components:    Anion Gap 21.0 (*)     All other components within normal limits   GLOMERULAR FILTRATION RATE, ESTIMATED - Abnormal; Notable for the following components:    Est, Glom Filt Rate 5 (*)     All other components within normal limits   BASIC METABOLIC PANEL - Abnormal; Notable for the following components:    Sodium 146 (*)     Potassium 3.4 (*)     Glucose 204 (*)     BUN 27 (*)     CREATININE 3.0 (*)     All other components within normal limits   CBC - Abnormal; Notable for the following components:    RBC 4.12 (*)     Hemoglobin 12.8 (*)     Hematocrit 38.1 (*)     RDW-SD 49.1 (*)     Platelets 114 (*)     All other components within normal limits   GLOMERULAR FILTRATION RATE, ESTIMATED - Abnormal; Notable for the following components:    Est, Glom Filt Rate 10 (*)     All other components within normal limits   GLOMERULAR FILTRATION RATE, ESTIMATED - Abnormal; Notable for the following components:    Est, Glom Filt Rate 17 (*)     All other components within normal limits   GLOMERULAR FILTRATION RATE, ESTIMATED - Abnormal; Notable for the following components:    Est, Glom Filt Rate 26 (*)     All other components within normal limits   BASIC METABOLIC PANEL W/ REFLEX TO MG FOR LOW K - Abnormal; Notable for the following components:    Potassium reflex Magnesium 3.4 (*)     Glucose 140 (*)     CREATININE 1.7 (*)     Calcium 8.4 (*)     All other components within normal limits   PHOSPHORUS - Abnormal; Notable for the following components:    Phosphorus 1.9 (*)     All other components within normal limits   GLOMERULAR FILTRATION RATE, ESTIMATED - Abnormal; Notable for the following components:    Est, Glom Filt Rate 50 (*)     All other components within normal limits   MAGNESIUM - Abnormal; Notable for the following components:    Magnesium 1.4 (*)     All other components within normal limits   BASIC METABOLIC PANEL W/ REFLEX TO MG FOR LOW K - Abnormal; Notable for the following components:    Potassium reflex Magnesium 3.1 (*)     Glucose 206 (*)     CREATININE 1.9 (*)     Calcium 7.5 (*)     All other components within normal limits   PHOSPHORUS - Abnormal; Notable for the following components:    Phosphorus 1.9 (*)     All other components within normal limits   GLOMERULAR FILTRATION RATE, ESTIMATED - Abnormal; Notable for the following components:    Est, Glom Filt Rate 44 (*)     All other components within normal limits   MAGNESIUM - Abnormal; Notable for the following components:    Magnesium 1.3 (*)     All other components within normal limits   BASIC METABOLIC PANEL W/ REFLEX TO MG FOR LOW K - Abnormal; Notable for the following components:    Potassium reflex Magnesium 3.3 (*)     CO2 20 (*)     CREATININE 1.5 (*)     Calcium 8.1 (*)     All other components within normal limits   ANION GAP - Abnormal; Notable for the following components:    Anion Gap 18.0 (*)     All other components within normal limits   GLOMERULAR FILTRATION RATE, ESTIMATED - Abnormal; Notable for the following components:    Est, Glom Filt Rate 57 (*)     All other components within normal limits   CBC - Abnormal; Notable for the following components:    WBC 12.4 (*)     RBC 4.03 (*)     Hemoglobin 12.4 (*)     Hematocrit 38.2 (*)     MCV 94.8 (*)     RDW-SD 47.9 (*)     All other components within normal limits   POCT GLUCOSE - Abnormal; Notable for the following components:    POC Glucose 154 (*)     All other components within normal limits   POCT GLUCOSE - Abnormal; Notable for the following components:    POC Glucose 232 (*)     All other components within normal limits   POCT GLUCOSE - Abnormal; Notable for the following components:    POC Glucose 264 (*)     All other components within normal limits   POCT GLUCOSE - Abnormal; Notable for the following components:    POC Glucose 246 (*)     All other components within normal limits   POCT GLUCOSE - Abnormal; Notable for the following components:    POC Glucose 171 (*)     All other components within normal limits   POCT GLUCOSE - Abnormal; Notable for the following components:    POC Glucose 139 (*)     All other components within normal limits   POCT GLUCOSE - Abnormal; Notable for the following components:    POC Glucose 207 (*)     All other components within normal limits   POCT GLUCOSE - Abnormal; Notable for the following components:    POC Glucose 209 (*)     All other components within normal limits   POCT GLUCOSE - Abnormal; Notable for the following components:    POC Glucose 117 (*)     All other components within normal limits   POCT GLUCOSE - Abnormal; Notable for the following components:    POC Glucose 203 (*)     All other components within normal limits   POCT GLUCOSE - Abnormal; Notable for the following components:    POC Glucose 129 (*)     All other components within normal limits   POCT GLUCOSE - Abnormal; Notable for the following components:    POC Glucose 156 (*)     All other components within normal limits   POCT GLUCOSE - Abnormal; Notable for the following components:    POC Glucose 112 (*)     All other components within normal limits   POCT GLUCOSE - Abnormal; Notable for the following components:    POC Glucose 151 (*)     All other components within normal limits   POCT GLUCOSE - Abnormal; Notable for the following components:    POC Glucose 159 (*)     All other components within normal limits   CULTURE, BLOOD 1    Narrative:     Source: blood-Adult-suboptimal <5.5oz./set volume       Site: Peripheral Vein            Current Antibiotics: not stated   CULTURE, BLOOD 2    Narrative:     Source: blood-Adult-suboptimal <5.5oz./set volume       Site: Peripheral Vein            Current Antibiotics: not stated   RAPID INFLUENZA A/B ANTIGENS   CULTURE, MRSA, SCREENING    Narrative:     Source: rectal       Site:           Current Antibiotics: not stated   VRE SCREEN BY PCR   C-REACTIVE PROTEIN   HEPATIC FUNCTION PANEL   LACTIC ACID, PLASMA   TROPONIN   TSH WITHOUT REFLEX   COVID-19   MRSA BY PCR   HEMOGLOBIN A1C   ELECTROLYTES URINE RANDOM   T4, FREE   PROTEIN, URINE, RANDOM   CREATININE, RANDOM URINE   MAGNESIUM   ANION GAP   ANION GAP   ANION GAP   ANION GAP   ANION GAP   MAGNESIUM   CALCIUM, IONIZED   PHOSPHORUS   CBC   BASIC METABOLIC PANEL W/ REFLEX TO MG FOR LOW K   POCT GLUCOSE   POCT GLUCOSE   POCT GLUCOSE       EMERGENCY DEPARTMENT COURSE:   Vitals:    Vitals:    09/29/20 0915 09/29/20 1105 09/29/20 1353 09/29/20 1617   BP: 105/63 112/65  135/76   Pulse: 115 122  115   Resp: 20 18  16   Temp: 99.1 °F (37.3 °C) 99.1 °F (37.3 °C)  98.7 °F (37.1 °C)   TempSrc: Oral Oral  Axillary   SpO2: 95% 97% 94% 96%   Weight:       Height:              MDM                  Patient was seen and evaluated in the ER for fatigue and malaise. sodium bicarbonate tablet 1,300 mg (1,300 mg Oral Given 9/29/20 1156)   0.9 % sodium chloride bolus (0 mLs Intravenous Stopped 9/25/20 2005)   insulin regular (HUMULIN R;NOVOLIN R) injection 10 Units (10 Units Intravenous Given 9/25/20 2158)     And   dextrose 50 % IV solution (25 g Intravenous Given 9/25/20 2157)   calcium gluconate 10 % injection 1 g (1 g Intravenous Given 9/25/20 2156)   albuterol (PROVENTIL) nebulizer solution 10 mg (10 mg Nebulization Given 9/25/20 2156)   metoprolol (LOPRESSOR) injection 5 mg (5 mg Intravenous Given 9/26/20 0145)   furosemide (LASIX) injection 80 mg (80 mg Intravenous Given 9/28/20 0803)   technetium mertiatide (MAG3) injection 12 millicurie (12 millicuries Intravenous Given 9/28/20 0753)   potassium phosphate 20 mmol in dextrose 5 % 250 mL IVPB (0 mmol Intravenous Stopped 9/29/20 1748)   magnesium sulfate 1 g in dextrose 5 % 100 mL IVPB (0 g Intravenous Stopped 9/29/20 1345)         Patient was seenindependently by myself. The patient's final impression and disposition and plan was determined by myself. CRITICAL CARE:   None    CONSULTS:  None    PROCEDURES:  None    FINAL IMPRESSION     1. SCOTTIE (acute kidney injury) ([facility] [address]    2. Urinary retention    3. Hyperkalemia    4. Fatigue, unspecified type    5. Elevated PSA          DISPOSITION/PLAN   DISPOSITION Admitted 09/25/2020 10:23:25 PM      PATIENT REFERREDTO:  [id number]  [address] [place] [address],[address]  In 1 week  void trial, fill and spill.  hospital follow up       DISCHARGE MEDICATIONS:  Current Discharge Medication List          (Please note that portions of this note were completed with a voice recognition program.  Efforts were made to edit the dictations but occasionally words are mis-transcribed.)         RUTH Graff CNP, APRN - CNP  09/29/20 2036

## 2020-09-30 NOTE — PROGRESS NOTES
Renal Progress Note    Assessment and Plan:    1. Acute kidney injury nearly completely resolved  2. Hypokalemia  3. Macrocytic anemia  4. Hyperglycemia  5. BPH  6. Elevated PSA level appears chronic  7. Post obstructive diuresis  8.   Hypophosphatemia  PLAN:  Labs reviewed  Serum creatinine is improved today  Urology note reviewed  Medications reviewed  Potassium phosphate 15 mmol IV piggyback once today  Labs in the morning if not discharged  Otherwise okay to discharge from renal perspective when discharged by the primary service  We will follow-up in the office in 4 weeks with a BMP a week before the appointment  Discussed with the patient and staff      Patient Active Problem List:     HTN (hypertension)     IFG (impaired fasting glucose)     Hyperlipidemia with target LDL less than 100     Elevated PSA     Chronic prostatitis     Benign prostatic hyperplasia with lower urinary tract symptoms     SCOTTIE (acute kidney injury) ([address]     Urinary retention     Enlarged prostate     Fatigue     Hyperkalemia     Accelerated hypertension      Subjective:   Admit Date: 9/25/2020    Interval History:   Seen for acute kidney injury  Awake and alert this morning  Denies any complaint  Updated by the staff  No issues of concern  Blood pressure is good  Urine output is good      Medications:   Scheduled Meds:   tamsulosin  0.4 mg Oral Daily    potassium bicarb-citric acid  40 mEq Oral Daily    sodium chloride flush  10 mL Intravenous 2 times per day    polyethylene glycol  17 g Oral Daily    heparin (porcine)  5,000 Units Subcutaneous 3 times per day    famotidine (PEPCID) injection  20 mg Intravenous Daily    potassium replacement protocol   Other RX Placeholder    aspirin  81 mg Oral Daily    atorvastatin  20 mg Oral Nightly    multivitamin  1 tablet Oral Daily    insulin lispro  0-6 Units Subcutaneous TID WC    insulin lispro  0-3 Units Subcutaneous Nightly     Continuous Infusions:   dextrose CBC:   Recent Labs     09/29/20  1724 09/30/20  0619   WBC 12.4* 9.9   HGB 12.4* 12.8*    159     CMP:    Recent Labs     09/28/20  1852 09/29/20  0622 09/30/20  0619    140 141   K 3.1* 3.3* 3.3*   CL 98 102 103   CO2 25 20* 26   BUN 15 12 11   CREATININE 1.9* 1.5* 1.3*   GLUCOSE 206* 108 131*   CALCIUM 7.5* 8.1* 8.1*   LABGLOM 44* 57* 68*     Troponin: No results for input(s): TROPONINI in the last 72 hours. BNP: No results for input(s): BNP in the last 72 hours. INR: No results for input(s): INR in the last 72 hours. Lipids: No results for input(s): CHOL, LDLDIRECT, TRIG, HDL, AMYLASE, LIPASE in the last 72 hours. Liver: No results for input(s): AST, ALT, ALKPHOS, PROT, LABALBU, BILITOT in the last 72 hours. Invalid input(s): BILDIR  Iron:  No results for input(s): IRONS, FERRITIN in the last 72 hours. Invalid input(s): LABIRONS  NM KIDNEY W FLOW AND FUNCTION W PHARMACOLOGICAL INTERVENTION   Final Result   1. Prompt and symmetric perfusion to the bilateral kidneys. 2. Differential renal function of 53% for the left kidney and 47% for the right kidney. 3. Delayed clearance of radiotracer from the right kidney with clearance after Lasix and no high-grade obstruction. Marked caliectasis. 4. Prompt uptake, excretion and clearance of radiotracer of the left kidney. Pelvocaliectasis without obstruction. **This report has been created using voice recognition software. It may contain minor errors which are inherent in voice recognition technology. **      Final report electronically signed by Dr. Sabina Koehler on 9/28/2020 9:28 AM      XR ABDOMEN (KUB) (SINGLE AP VIEW)   Final Result   Nonobstructive bowel gas pattern. **This report has been created using voice recognition software. It may contain minor errors which are inherent in voice recognition technology. **      Final report electronically signed by Dr Jean-Pierre De La Rosa on 9/26/2020 9:06 AM      US RENAL COMPLETE   Final Result   1. No acute findings. No hydronephrosis. 2. Solitary benign-appearing cyst in each kidney. 3. Small Angiomyolipoma left kidney. **This report has been created using voice recognition software. It may contain minor errors which are inherent in voice recognition technology. **      Final report electronically signed by Dr. Daniel Ho on 9/26/2020 4:00 AM      IR FLUORO GUIDED CVA DEVICE PLMT/REPLACE/REMOVAL   Final Result   Status post successful nontunneled dialysis catheter insertion. **This report has been created using voice recognition software. It may contain minor errors which are inherent in voice recognition technology. **      Final report electronically signed by Dr Edvin Her on 9/26/2020 7:12 AM      XR CHEST PORTABLE   Final Result   No acute findings               **This report has been created using voice recognition software. It may contain minor errors which are inherent in voice recognition technology. **      Final report electronically signed by Dr. Chivo Tiwari on 9/25/2020 7:27 PM            Objective:   Vitals: BP (!) 142/83   Pulse 101   Temp 97.8 °F (36.6 °C) (Oral)   Resp 16   Ht 6' 2\" (1.88 m)   Wt 253 lb 8 oz (115 kg)   SpO2 96%   BMI 32.55 kg/m²    Wt Readings from Last 3 Encounters:   09/30/20 253 lb 8 oz (115 kg)   08/04/20 275 lb 3.2 oz (124.8 kg)   01/29/20 275 lb 12.8 oz (125.1 kg)      24HR INTAKE/OUTPUT:      Intake/Output Summary (Last 24 hours) at 9/30/2020 0821  Last data filed at 9/30/2020 0405  Gross per 24 hour   Intake 942 ml   Output 6020 ml   Net -5078 ml       Constitutional:  Alert, awake, no apparent distress   Skin:normal with no rash or lesions. HEENT:Pupils are reactive . Throat is clear . Oral mucosa is moist   Neck:supple with no thyromegaly or carotid bruit  Cardiovascular:  S1, S2 without murmur or rubs   Respiratory:  Clear to ausculation without wheezes, rhonchi or rales.   Abdomen: +bs, soft, no tenderness to palpation and no palpable mass. No abdominal bruit   Ext: No LE edema  Musculoskeletal:Intact  Neuro:Alert and awake. Well oriented  with no focal deficit. Speech is normal      Electronically signed by Nita Acevedo MD on 9/30/2020 at 8:21 AM

## 2020-10-01 LAB
ANION GAP SERPL CALCULATED.3IONS-SCNC: 8 MEQ/L (ref 8–16)
BLOOD CULTURE, ROUTINE: NORMAL
BLOOD CULTURE, ROUTINE: NORMAL
BUN BLDV-MCNC: 12 MG/DL (ref 7–22)
CALCIUM SERPL-MCNC: 8.5 MG/DL (ref 8.5–10.5)
CHLORIDE BLD-SCNC: 102 MEQ/L (ref 98–111)
CO2: 28 MEQ/L (ref 23–33)
CREAT SERPL-MCNC: 1.3 MG/DL (ref 0.4–1.2)
ERYTHROCYTE [DISTWIDTH] IN BLOOD BY AUTOMATED COUNT: 13.9 % (ref 11.5–14.5)
ERYTHROCYTE [DISTWIDTH] IN BLOOD BY AUTOMATED COUNT: 48.1 FL (ref 35–45)
GLUCOSE BLD-MCNC: 111 MG/DL (ref 70–108)
GLUCOSE BLD-MCNC: 114 MG/DL (ref 70–108)
GLUCOSE BLD-MCNC: 138 MG/DL (ref 70–108)
GLUCOSE BLD-MCNC: 157 MG/DL (ref 70–108)
GLUCOSE BLD-MCNC: 187 MG/DL (ref 70–108)
HCT VFR BLD CALC: 39.6 % (ref 42–52)
HEMOGLOBIN: 12.7 GM/DL (ref 14–18)
MCH RBC QN AUTO: 30.5 PG (ref 26–33)
MCHC RBC AUTO-ENTMCNC: 32.1 GM/DL (ref 32.2–35.5)
MCV RBC AUTO: 95 FL (ref 80–94)
PHOSPHORUS: 1.9 MG/DL (ref 2.4–4.7)
PLATELET # BLD: 165 THOU/MM3 (ref 130–400)
PMV BLD AUTO: 10.8 FL (ref 9.4–12.4)
POTASSIUM REFLEX MAGNESIUM: 3.6 MEQ/L (ref 3.5–5.2)
RBC # BLD: 4.17 MILL/MM3 (ref 4.7–6.1)
SODIUM BLD-SCNC: 138 MEQ/L (ref 135–145)
WBC # BLD: 9.2 THOU/MM3 (ref 4.8–10.8)

## 2020-10-01 PROCEDURE — 6370000000 HC RX 637 (ALT 250 FOR IP): Performed by: INTERNAL MEDICINE

## 2020-10-01 PROCEDURE — 99232 SBSQ HOSP IP/OBS MODERATE 35: CPT | Performed by: INTERNAL MEDICINE

## 2020-10-01 PROCEDURE — 1200000003 HC TELEMETRY R&B

## 2020-10-01 PROCEDURE — 82948 REAGENT STRIP/BLOOD GLUCOSE: CPT

## 2020-10-01 PROCEDURE — 6360000002 HC RX W HCPCS: Performed by: INTERNAL MEDICINE

## 2020-10-01 PROCEDURE — 6370000000 HC RX 637 (ALT 250 FOR IP): Performed by: NURSE PRACTITIONER

## 2020-10-01 PROCEDURE — 6360000002 HC RX W HCPCS: Performed by: NURSE PRACTITIONER

## 2020-10-01 PROCEDURE — 36415 COLL VENOUS BLD VENIPUNCTURE: CPT

## 2020-10-01 PROCEDURE — 2500000003 HC RX 250 WO HCPCS: Performed by: NURSE PRACTITIONER

## 2020-10-01 PROCEDURE — 2500000003 HC RX 250 WO HCPCS: Performed by: INTERNAL MEDICINE

## 2020-10-01 PROCEDURE — 85027 COMPLETE CBC AUTOMATED: CPT

## 2020-10-01 PROCEDURE — 2580000003 HC RX 258: Performed by: INTERNAL MEDICINE

## 2020-10-01 PROCEDURE — 80048 BASIC METABOLIC PNL TOTAL CA: CPT

## 2020-10-01 PROCEDURE — 84100 ASSAY OF PHOSPHORUS: CPT

## 2020-10-01 PROCEDURE — 99233 SBSQ HOSP IP/OBS HIGH 50: CPT | Performed by: INTERNAL MEDICINE

## 2020-10-01 RX ORDER — DESMOPRESSIN ACETATE 4 UG/ML
1 INJECTION, SOLUTION INTRAVENOUS; SUBCUTANEOUS ONCE
Status: COMPLETED | OUTPATIENT
Start: 2020-10-01 | End: 2020-10-01

## 2020-10-01 RX ADMIN — ASPIRIN 81 MG: 81 TABLET ORAL at 09:40

## 2020-10-01 RX ADMIN — DESMOPRESSIN ACETATE 1 MCG: 4 SOLUTION INTRAVENOUS at 12:16

## 2020-10-01 RX ADMIN — POTASSIUM PHOSPHATE, MONOBASIC AND POTASSIUM PHOSPHATE, DIBASIC 20 MMOL: 224; 236 INJECTION, SOLUTION, CONCENTRATE INTRAVENOUS at 12:16

## 2020-10-01 RX ADMIN — SODIUM CHLORIDE, POTASSIUM CHLORIDE, SODIUM LACTATE AND CALCIUM CHLORIDE: 600; 310; 30; 20 INJECTION, SOLUTION INTRAVENOUS at 12:16

## 2020-10-01 RX ADMIN — POLYETHYLENE GLYCOL 3350 17 G: 17 POWDER, FOR SOLUTION ORAL at 09:40

## 2020-10-01 RX ADMIN — HEPARIN SODIUM 5000 UNITS: 5000 INJECTION INTRAVENOUS; SUBCUTANEOUS at 20:56

## 2020-10-01 RX ADMIN — POTASSIUM & SODIUM PHOSPHATES POWDER PACK 280-160-250 MG 250 MG: 280-160-250 PACK at 12:16

## 2020-10-01 RX ADMIN — INSULIN LISPRO 1 UNITS: 100 INJECTION, SOLUTION INTRAVENOUS; SUBCUTANEOUS at 20:55

## 2020-10-01 RX ADMIN — POTASSIUM & SODIUM PHOSPHATES POWDER PACK 280-160-250 MG 250 MG: 280-160-250 PACK at 21:01

## 2020-10-01 RX ADMIN — THERA TABS 1 TABLET: TAB at 09:41

## 2020-10-01 RX ADMIN — ATORVASTATIN CALCIUM 20 MG: 20 TABLET, FILM COATED ORAL at 20:58

## 2020-10-01 RX ADMIN — POTASSIUM BICARBONATE 40 MEQ: 782 TABLET, EFFERVESCENT ORAL at 09:40

## 2020-10-01 RX ADMIN — HEPARIN SODIUM 5000 UNITS: 5000 INJECTION INTRAVENOUS; SUBCUTANEOUS at 05:33

## 2020-10-01 RX ADMIN — TAMSULOSIN HYDROCHLORIDE 0.4 MG: 0.4 CAPSULE ORAL at 09:41

## 2020-10-01 RX ADMIN — FAMOTIDINE 20 MG: 10 INJECTION INTRAVENOUS at 09:40

## 2020-10-01 ASSESSMENT — PAIN SCALES - GENERAL: PAINLEVEL_OUTOF10: 0

## 2020-10-01 NOTE — PROGRESS NOTES
Hospitalist Progress Note      Patient:  Natasha Rossi    Unit/Bed:6K-10/010-A  YOB: 1960  MRN: [id number]   Acct: [de-identified]   PCP: Sukhjinder Easley DO  Date of Admission: 9/25/2020    Assessment/Plan:    1. Acute Urinary Retention secondary to outlet obstruction most probably from prostate enlargement:  S/P Garcia catheter placement.  Hold and avoid any anticholinergic medications.  Urology following.  Strict hourly intake and output.  PSA 24.02. Urology plans to remove outpatient after voiding trial  2. SCOTTIE on CKD, Stage II with Associated Hyperkalemia:  secondary to #1.  Dramatic improvement in kidney function test after Garcia catheter insertion with with urine output 17.1 L over 2 days. Continue potassium replacement, urology and nephrology following.    3. Post-Obstructive Diuresis: Patient is net -17 L this admission and continues to have brisk urine output. He is little bit tachycardic and appears dry. We will continue IV fluids until postobstructive diuresis resolves. Nephrology giving dose of DDAVP will monitor response. If improved could potentially discharge tomorrow  4. Hematuria: Improving, secondary to #1. Urology following.    5. History BPH/Prostatitis: Urology following.    6. Accelerated Hypertension: Patient responded well to IVF.  Lopressor IV available. Continue Norvasc.    7. High Anion Gap Metabolic Acidosis: Secondary to SCOTTIE and tissue hypoxia from hypotension, give sodium bicarb 13 mg twice daily per nephrology recommendations. 8. Normocytic Anemia:  Hemoglobin 12.7; baseline Hg appx. 15.  Dilutional effect.  Monitor hematuria and trend CBC.  Continue multivitamin.     9. BPH/History Prostatitis:  Not on any alpha-1-adrernergic antagonists outpatient.  Consult to Urology as above. 10. Constipation:  KUB performed on 9/26/2020 demonstrates nonobstructive bowel gas pattern .  Continue Stool or unusual neck stiffness. PULMONARY: Respirations easy and non-labored, no acute distress. CARDIAC: No chest pain, pressure, Negative for lower leg edema. GI: Abdomen is soft and non-tender, non-distended. PERIPHERAL VASCULAR: No intermittent claudication or unusual leg cramps. MUSCULOSKELETAL: Occasional arthralgias, myalgias. NEUROLOGICAL: Denies any headache, near syncope, seizures or syncope. HEMATOLOGIC:  No unusual bruising or bleeding. PSYCH: Denies any homicidal or suicidial ideations. Medications:  Reviewed    Infusion Medications    lactated ringers 125 mL/hr at 10/01/20 1216    dextrose       Scheduled Medications    potassium & sodium phosphates  1 packet Oral BID    tamsulosin  0.4 mg Oral Daily    potassium bicarb-citric acid  40 mEq Oral Daily    sodium chloride flush  10 mL Intravenous 2 times per day    polyethylene glycol  17 g Oral Daily    heparin (porcine)  5,000 Units Subcutaneous 3 times per day    famotidine (PEPCID) injection  20 mg Intravenous Daily    potassium replacement protocol   Other RX Placeholder    aspirin  81 mg Oral Daily    atorvastatin  20 mg Oral Nightly    multivitamin  1 tablet Oral Daily    insulin lispro  0-6 Units Subcutaneous TID WC    insulin lispro  0-3 Units Subcutaneous Nightly     PRN Meds: sodium chloride flush, acetaminophen **OR** acetaminophen, promethazine **OR** ondansetron, glucose, dextrose, glucagon (rDNA), dextrose, [Held by provider] opium-belladonna      Intake/Output Summary (Last 24 hours) at 10/1/2020 1711  Last data filed at 10/1/2020 1555  Gross per 24 hour   Intake 3437.89 ml   Output 6775 ml   Net -3337.11 ml       Diet:  DIET GENERAL; No Added Salt (3-4 GM)    Exam:  /74   Pulse 100   Temp 98.4 °F (36.9 °C) (Oral)   Resp 18   Ht 6' 2\" (1.88 m)   Wt 253 lb 8 oz (115 kg)   SpO2 98%   BMI 32.55 kg/m²     General appearance: Alert and appropriate, pleasant adult male.   No apparent distress, appears stated age and cooperative. HEENT: Pupils equal, round, and reactive to light. Conjunctivae/corneas clear. Neck: Supple, with full range of motion. No jugular venous distention. Trachea midline. Respiratory:  Normal respiratory effort. Clear to auscultation, bilaterally without Rales/Wheezes/Rhonchi. Cardiovascular:  Tachycardic rate with regular rhythm no murmurs rubs or gallops  Abdomen: Soft, non-tender, non-distended with normal bowel sounds. Musculoskeletal: Passive and active ROM x 4 extremities. Skin: Skin color, texture, turgor normal.  No rashes or lesions. Neurologic:  Neurovascularly intact without any focal sensory/motor deficits. Cranial nerves: II-XII intact, grossly non-focal.  Psychiatric: Alert and oriented to person, place, time, and situation. Thought content appropriate, normal insight  Capillary Refill: Brisk,< 3 seconds   Peripheral Pulses: +2 palpable, equal bilaterally    Labs:   Recent Labs     09/29/20  1724 09/30/20  0619 10/01/20  0559   WBC 12.4* 9.9 9.2   HGB 12.4* 12.8* 12.7*   HCT 38.2* 39.3* 39.6*    159 165     Recent Labs     09/29/20  0622 09/30/20  0619 10/01/20  0559    141 138   K 3.3* 3.3* 3.6    103 102   CO2 20* 26 28   BUN 12 11 12   CREATININE 1.5* 1.3* 1.3*   CALCIUM 8.1* 8.1* 8.5   PHOS 1.9* 2.1* 1.9*     No results for input(s): AST, ALT, BILIDIR, BILITOT, ALKPHOS in the last 72 hours. No results for input(s): INR in the last 72 hours. No results for input(s): Ken Beery in the last 72 hours.     Microbiology:    Blood culture #1:   Lab Results   Component Value Date    BC No growth-preliminary No growth  09/25/2020       Blood culture #2:No results found for: Gokul Jordan    Organism:No results found for: ORG    No results found for: LABGRAM    MRSA culture only:No results found for: [facility]    Urine culture:   Lab Results   Component Value Date    LABURIN No growth-preliminary No growth  09/04/2020       Respiratory culture: No results found for: CULTRESP    Aerobic and Anaerobic :  No results found for: LABAERO  No results found for: LABANAE    Urinalysis:      Lab Results   Component Value Date    NITRU NEGATIVE 09/25/2020    WBCUA 0-2 09/25/2020    BACTERIA NONE SEEN 09/25/2020    RBCUA 0-2 09/25/2020    BLOODU TRACE 09/25/2020    SPECGRAV 1.006 09/04/2020    GLUCOSEU NEGATIVE 09/25/2020       Radiology:  NM KIDNEY W FLOW AND FUNCTION W PHARMACOLOGICAL INTERVENTION   Final Result   1. Prompt and symmetric perfusion to the bilateral kidneys. 2. Differential renal function of 53% for the left kidney and 47% for the right kidney. 3. Delayed clearance of radiotracer from the right kidney with clearance after Lasix and no high-grade obstruction. Marked caliectasis. 4. Prompt uptake, excretion and clearance of radiotracer of the left kidney. Pelvocaliectasis without obstruction. **This report has been created using voice recognition software. It may contain minor errors which are inherent in voice recognition technology. **      Final report electronically signed by Dr. Devika Hernández on 9/28/2020 9:28 AM      XR ABDOMEN (KUB) (SINGLE AP VIEW)   Final Result   Nonobstructive bowel gas pattern. **This report has been created using voice recognition software. It may contain minor errors which are inherent in voice recognition technology. **      Final report electronically signed by Dr Krys Nicole on 9/26/2020 9:06 AM      US RENAL COMPLETE   Final Result   1. No acute findings. No hydronephrosis. 2. Solitary benign-appearing cyst in each kidney. 3. Small Angiomyolipoma left kidney. **This report has been created using voice recognition software. It may contain minor errors which are inherent in voice recognition technology. **      Final report electronically signed by Dr. Kaushik Guo on 9/26/2020 4:00 AM      IR FLUORO GUIDED CVA DEVICE PLMT/REPLACE/REMOVAL   Final Result   Status post successful nontunneled dialysis catheter insertion. **This report has been created using voice recognition software. It may contain minor errors which are inherent in voice recognition technology. **      Final report electronically signed by Dr Ankit Vázquez on 9/26/2020 7:12 AM      XR CHEST PORTABLE   Final Result   No acute findings               **This report has been created using voice recognition software. It may contain minor errors which are inherent in voice recognition technology. **      Final report electronically signed by Dr. Michaele Castleman on 9/25/2020 7:27 PM        Xr Abdomen (kub) (single Ap View)    Result Date: 9/26/2020  PROCEDURE: XR ABDOMEN (KUB) (SINGLE AP VIEW) CLINICAL INFORMATION: 68-year-old male with abdominal pain and constipation. COMPARISON: No prior study. TECHNIQUE: 2 AP views of the abdomen were obtained in the supine position. FINDINGS: There are no distended bowel loops. There are no displaced bowel loops. There is no gross free air. No suspicious densities are present. Some degenerative changes are seen in the spine. Nonobstructive bowel gas pattern. **This report has been created using voice recognition software. It may contain minor errors which are inherent in voice recognition technology. ** Final report electronically signed by Dr Ankit Vázquez on 9/26/2020 9:06 AM    Us Renal Complete    Result Date: 9/26/2020  BILATERAL RENAL ULTRASOUND: CLINICAL INFORMATION: post obstructive. Severe acute renal injury COMPARISON: No comparison available. TECHNIQUE: Multiple sonographic images of both kidneys were obtained. Images of the urinary bladder were also obtained. FINDINGS: RIGHT KIDNEY - 12.6 x 7.3 x 6.8 cm Resistive Index - 0.58 Cortical Thickness - 0.96 cm LEFT KIDNEY - 12.7 x 7.8 x 6.2 cm Resistive Index - 0.57 Cortical Thickness - 0.91 cm URINARY BLADDER Pre-Void - aponte Post-Void - aponte  KIDNEYS:  Both kidneys are normal in size and contour.   The resistive indices are normal.  MASS/CYST: There is a benign-appearing 5.2 cm cyst mid body right kidney. 1.8 cm hyperechoic lesion mid body left kidney, likely an angiomyolipoma. Small benign-appearing 15 mm cyst mid body left kidney. HYDRONEPHROSIS:  There is no hydronephrosis. CALCULI:  No calculi are seen. BLADDER:  Bladder empty with Garcia catheter in place. .     1. No acute findings. No hydronephrosis. 2. Solitary benign-appearing cyst in each kidney. 3. Small Angiomyolipoma left kidney. **This report has been created using voice recognition software. It may contain minor errors which are inherent in voice recognition technology. ** Final report electronically signed by Dr. Vijaya Bo on 9/26/2020 4:00 AM    No [place] Cva Device Plmt/replace/removal    Result Date: 9/26/2020  NON-TUNNELED DIALYSIS CATHETER INSERTION: CLINICAL INFORMATION: 66-year-old male with renal failure. PERFORMED BY: Dr. Eleuterio Cali M.D. APPROACH : Right Internal Jugular Vein, ultrasound guidance, micropuncture technique. DIALYSIS CATHETER:  14 [place] Hemocath, 20 cm in length. DIALYSIS CATHETER TIP:  Right [place] FLUOROSCOPY TIME: 48 seconds FLUOROSCOPIC IMAGES: 1 PROCEDURE:  Signed informed consent was obtained prior to performing this procedure. The patient was not sedated during this procedure but was monitored with EKG and pulse-ox monitoring devices by a registered nurse. Following local anesthesia and utilizing MAXIMAL STERILE BARRIER TECHNIQUE, the vein listed above was punctured with a micropuncture needle, utilizing ultrasound guidance, an image was obtained confirming needle position and vessel patency. .  A .018 wire  was passed through this needle, followed by insertion of a 4 [place] Maribell dilator. Following guide wire and catheter exchange, a percutaneous tract was dilated to a 14 Western Maribell size.   Then, the temporary dialysis catheter was advanced over an .035 guide wire, with fluoroscopic guidance, with the tip at the location as

## 2020-10-01 NOTE — CARE COORDINATION
10/1/20, 12:38 PM EDT    DISCHARGE ONGOING EVALUATION:     Will Castro day: 6  Location: -10/010-A Reason for admit: Acute kidney injury ([address] [N17.9]   Treatment Plan of Care: Creat 1.3, phos 1.9. Patient possibly over-diuresed. Nephrology has ordered IV desmopressin X1, lyte replacement, IV rate decreased. Barriers to Discharge: not medically ready   PCP: Della Goins DO  Readmission Risk Score: 15%  Patient Goals/Plan/Treatment Preferences: Home alone, will go home with aponte in place. Denies needs.

## 2020-10-01 NOTE — PROGRESS NOTES
Renal Progress Note    Assessment and Plan:    1. Acute kidney injury resolving  2. Hypokalemia due to urinary loss  3. Macrocytic anemia  4. Hyperglycemia  5. BPH  6. Elevated PSA level chronic and followed by urology  7. Post obstructive diuresis  8. Hypophosphatemia due to urinary phosphorus loss  PLAN:  Labs reviewed  Serum creatinine is stable  Medications reviewed  Potassium phosphate 20 mmol IV piggyback once today  Increase oral potassium to twice a day  Neutra-Phos twice a day  Decrease IV fluid to 50 mL/h from 125 mL/h  It may be true he is having excessive diuresis. However, the more fluid we give him the more he was diures.   Desmopressin 1 mg IV once  Labs in the morning   We will follow-      Patient Active Problem List:     HTN (hypertension)     IFG (impaired fasting glucose)     Hyperlipidemia with target LDL less than 100     Elevated PSA     Chronic prostatitis     Benign prostatic hyperplasia with lower urinary tract symptoms     SCOTTIE (acute kidney injury) ([facility] [address]     Urinary retention     Enlarged prostate     Fatigue     Hyperkalemia     Accelerated hypertension      Subjective:   Admit Date: 9/25/2020    Interval History:   Seen for acute kidney injury  Awake and alert this morning  Feels good with no complaint  Blood pressure is good  Urine output is good      Medications:   Scheduled Meds:   tamsulosin  0.4 mg Oral Daily    potassium bicarb-citric acid  40 mEq Oral Daily    sodium chloride flush  10 mL Intravenous 2 times per day    polyethylene glycol  17 g Oral Daily    heparin (porcine)  5,000 Units Subcutaneous 3 times per day    famotidine (PEPCID) injection  20 mg Intravenous Daily    potassium replacement protocol   Other RX Placeholder    aspirin  81 mg Oral Daily    atorvastatin  20 mg Oral Nightly    multivitamin  1 tablet Oral Daily    insulin lispro  0-6 Units Subcutaneous TID     insulin lispro  0-3 Units Subcutaneous Nightly     Continuous Infusions:   report electronically signed by Dr Jean-Pierre De La Rosa on 9/26/2020 9:06 AM      US RENAL COMPLETE   Final Result   1. No acute findings. No hydronephrosis. 2. Solitary benign-appearing cyst in each kidney. 3. Small Angiomyolipoma left kidney. **This report has been created using voice recognition software. It may contain minor errors which are inherent in voice recognition technology. **      Final report electronically signed by Dr. Filomena Grant on 9/26/2020 4:00 AM      IR FLUORO GUIDED CVA DEVICE PLMT/REPLACE/REMOVAL   Final Result   Status post successful nontunneled dialysis catheter insertion. **This report has been created using voice recognition software. It may contain minor errors which are inherent in voice recognition technology. **      Final report electronically signed by Dr Jean-Pierre De La Rosa on 9/26/2020 7:12 AM      XR CHEST PORTABLE   Final Result   No acute findings               **This report has been created using voice recognition software. It may contain minor errors which are inherent in voice recognition technology. **      Final report electronically signed by Dr. Harsha Alves on 9/25/2020 7:27 PM            Objective:   Vitals: /73   Pulse 110   Temp 98.3 °F (36.8 °C) (Oral)   Resp 18   Ht 6' 2\" (1.88 m)   Wt 253 lb 8 oz (115 kg)   SpO2 95%   BMI 32.55 kg/m²    Wt Readings from Last 3 Encounters:   09/30/20 253 lb 8 oz (115 kg)   08/04/20 275 lb 3.2 oz (124.8 kg)   01/29/20 275 lb 12.8 oz (125.1 kg)      24HR INTAKE/OUTPUT:      Intake/Output Summary (Last 24 hours) at 10/1/2020 1044  Last data filed at 10/1/2020 0340  Gross per 24 hour   Intake 3823.89 ml   Output 6025 ml   Net -2201.11 ml       Constitutional:  Alert, awake, no apparent distress   Skin:normal with no rash or lesions. HEENT:Pupils are reactive . Throat is clear . Oral mucosa is moist   Neck:supple with no thyromegaly or carotid bruit  Cardiovascular:  S1, S2 without murmur or rubs Respiratory:  Clear to ausculation without wheezes, rhonchi or rales. Abdomen: +bs, soft, no tenderness to palpation and no palpable mass. No abdominal bruit   Ext: No LE edema  Musculoskeletal:Intact  Neuro:Alert and awake. Well oriented  with no focal deficit. Speech is normal      Electronically signed by Alberto Suarez MD on 10/1/2020 at 10:44 AM

## 2020-10-02 ENCOUNTER — TELEPHONE (OUTPATIENT)
Dept: UROLOGY | Age: 60
End: 2020-10-02

## 2020-10-02 LAB
ANION GAP SERPL CALCULATED.3IONS-SCNC: 11 MEQ/L (ref 8–16)
BUN BLDV-MCNC: 10 MG/DL (ref 7–22)
CALCIUM SERPL-MCNC: 8.2 MG/DL (ref 8.5–10.5)
CHLORIDE BLD-SCNC: 103 MEQ/L (ref 98–111)
CO2: 27 MEQ/L (ref 23–33)
CREAT SERPL-MCNC: 1.1 MG/DL (ref 0.4–1.2)
ERYTHROCYTE [DISTWIDTH] IN BLOOD BY AUTOMATED COUNT: 13.8 % (ref 11.5–14.5)
ERYTHROCYTE [DISTWIDTH] IN BLOOD BY AUTOMATED COUNT: 47.1 FL (ref 35–45)
GLUCOSE BLD-MCNC: 118 MG/DL (ref 70–108)
GLUCOSE BLD-MCNC: 119 MG/DL (ref 70–108)
GLUCOSE BLD-MCNC: 123 MG/DL (ref 70–108)
GLUCOSE BLD-MCNC: 163 MG/DL (ref 70–108)
GLUCOSE BLD-MCNC: 177 MG/DL (ref 70–108)
HCT VFR BLD CALC: 33.9 % (ref 42–52)
HEMOGLOBIN: 11 GM/DL (ref 14–18)
MCH RBC QN AUTO: 30.6 PG (ref 26–33)
MCHC RBC AUTO-ENTMCNC: 32.4 GM/DL (ref 32.2–35.5)
MCV RBC AUTO: 94.4 FL (ref 80–94)
PHOSPHORUS: 2.4 MG/DL (ref 2.4–4.7)
PLATELET # BLD: 161 THOU/MM3 (ref 130–400)
PMV BLD AUTO: 10.5 FL (ref 9.4–12.4)
POTASSIUM REFLEX MAGNESIUM: 3.6 MEQ/L (ref 3.5–5.2)
RBC # BLD: 3.59 MILL/MM3 (ref 4.7–6.1)
SODIUM BLD-SCNC: 141 MEQ/L (ref 135–145)
WBC # BLD: 7.7 THOU/MM3 (ref 4.8–10.8)

## 2020-10-02 PROCEDURE — 6370000000 HC RX 637 (ALT 250 FOR IP): Performed by: INTERNAL MEDICINE

## 2020-10-02 PROCEDURE — 84100 ASSAY OF PHOSPHORUS: CPT

## 2020-10-02 PROCEDURE — 6370000000 HC RX 637 (ALT 250 FOR IP): Performed by: NURSE PRACTITIONER

## 2020-10-02 PROCEDURE — 1200000003 HC TELEMETRY R&B

## 2020-10-02 PROCEDURE — 85027 COMPLETE CBC AUTOMATED: CPT

## 2020-10-02 PROCEDURE — 2580000003 HC RX 258: Performed by: NURSE PRACTITIONER

## 2020-10-02 PROCEDURE — 99232 SBSQ HOSP IP/OBS MODERATE 35: CPT | Performed by: FAMILY MEDICINE

## 2020-10-02 PROCEDURE — 94760 N-INVAS EAR/PLS OXIMETRY 1: CPT

## 2020-10-02 PROCEDURE — 6360000002 HC RX W HCPCS: Performed by: NURSE PRACTITIONER

## 2020-10-02 PROCEDURE — 36415 COLL VENOUS BLD VENIPUNCTURE: CPT

## 2020-10-02 PROCEDURE — 2500000003 HC RX 250 WO HCPCS: Performed by: NURSE PRACTITIONER

## 2020-10-02 PROCEDURE — 80048 BASIC METABOLIC PNL TOTAL CA: CPT

## 2020-10-02 PROCEDURE — 82948 REAGENT STRIP/BLOOD GLUCOSE: CPT

## 2020-10-02 PROCEDURE — 99232 SBSQ HOSP IP/OBS MODERATE 35: CPT | Performed by: NURSE PRACTITIONER

## 2020-10-02 RX ORDER — DESMOPRESSIN ACETATE 4 UG/ML
1 INJECTION, SOLUTION INTRAVENOUS; SUBCUTANEOUS ONCE
Status: COMPLETED | OUTPATIENT
Start: 2020-10-02 | End: 2020-10-02

## 2020-10-02 RX ADMIN — TAMSULOSIN HYDROCHLORIDE 0.4 MG: 0.4 CAPSULE ORAL at 09:31

## 2020-10-02 RX ADMIN — THERA TABS 1 TABLET: TAB at 09:31

## 2020-10-02 RX ADMIN — ASPIRIN 81 MG: 81 TABLET ORAL at 09:31

## 2020-10-02 RX ADMIN — HEPARIN SODIUM 5000 UNITS: 5000 INJECTION INTRAVENOUS; SUBCUTANEOUS at 21:05

## 2020-10-02 RX ADMIN — HEPARIN SODIUM 5000 UNITS: 5000 INJECTION INTRAVENOUS; SUBCUTANEOUS at 14:43

## 2020-10-02 RX ADMIN — ATORVASTATIN CALCIUM 20 MG: 20 TABLET, FILM COATED ORAL at 21:06

## 2020-10-02 RX ADMIN — SODIUM CHLORIDE, PRESERVATIVE FREE 10 ML: 5 INJECTION INTRAVENOUS at 21:06

## 2020-10-02 RX ADMIN — INSULIN LISPRO 1 UNITS: 100 INJECTION, SOLUTION INTRAVENOUS; SUBCUTANEOUS at 21:06

## 2020-10-02 RX ADMIN — HEPARIN SODIUM 5000 UNITS: 5000 INJECTION INTRAVENOUS; SUBCUTANEOUS at 06:34

## 2020-10-02 RX ADMIN — DESMOPRESSIN ACETATE 1 MCG: 4 SOLUTION INTRAVENOUS at 14:42

## 2020-10-02 RX ADMIN — POTASSIUM & SODIUM PHOSPHATES POWDER PACK 280-160-250 MG 250 MG: 280-160-250 PACK at 09:31

## 2020-10-02 RX ADMIN — POTASSIUM & SODIUM PHOSPHATES POWDER PACK 280-160-250 MG 250 MG: 280-160-250 PACK at 21:16

## 2020-10-02 RX ADMIN — FAMOTIDINE 20 MG: 10 INJECTION INTRAVENOUS at 09:31

## 2020-10-02 RX ADMIN — POTASSIUM BICARBONATE 40 MEQ: 782 TABLET, EFFERVESCENT ORAL at 09:30

## 2020-10-02 ASSESSMENT — PAIN SCALES - GENERAL
PAINLEVEL_OUTOF10: 0
PAINLEVEL_OUTOF10: 0

## 2020-10-02 NOTE — PLAN OF CARE
Problem: Falls - Risk of:  Goal: Will remain free from falls  Description: Will remain free from falls  9/28/2020 1041 by Kolton Earl RN  Outcome: Ongoing  Note: No falls noted this shift. Continue falling star program. Bed alarm on, bed in low position. Call light and personal belongings in reach. Patient uses call light appropriately. Problem: Tissue Perfusion - Renal, Altered:  Goal: Electrolytes within specified parameters  Description: Electrolytes within specified parameters  9/28/2020 1041 by Kolton Earl RN  Outcome: Ongoing  Note: Patient Cr improving nephrology following he went down to have a kidney nuc med test today    Ref. Range 9/28/2020 06:16   Sodium Latest Ref Range: 135 - 145 meq/L 143   Potassium Latest Ref Range: 3.5 - 5.2 meq/L 3.4 (L)   Chloride Latest Ref Range: 98 - 111 meq/L 104   CO2 Latest Ref Range: 23 - 33 meq/L 27   BUN Latest Ref Range: 7 - 22 mg/dL 12   Creatinine Latest Ref Range: 0.4 - 1.2 mg/dL 1.7 (H)   Anion Gap Latest Ref Range: 8.0 - 16.0 meq/L 12.0   Est, Glom Filt Rate Latest Units: ml/min/1.73m2 50 (A)   Magnesium Latest Ref Range: 1.6 - 2.4 mg/dL 1.4 (L)        Problem: Urinary Elimination:  Goal: Signs and symptoms of infection will decrease  Description: Signs and symptoms of infection will decrease  9/28/2020 1041 by Kolton Earl RN  Outcome: Ongoing  Note: Patient has aponte catheter in place for strict I&O urology following aponte draining well      Problem: Discharge Planning:  Goal: Discharged to appropriate level of care  Description: Discharged to appropriate level of care  Outcome: Ongoing  Note: Patient plans to be discharged home with family when medically stable    Care plan reviewed with patient and family. Patient and family verbalize understanding of the plan of care and contribute to goal setting.
Problem: Falls - Risk of:  Goal: Will remain free from falls  Description: Will remain free from falls  9/30/2020 2157 by Richie Palmer RN  Outcome: Ongoing  Note: Call light within reach. Side rails up x2. Bed alarm on. Non skid slippers available. Problem: Falls - Risk of:  Goal: Absence of physical injury  Description: Absence of physical injury  9/30/2020 2157 by Richie Palmer RN  Outcome: Ongoing  Note: There has not been any episodes of physical injury at this time in the shift. Problem: Tissue Perfusion - Renal, Altered:  Goal: Serum creatinine will be within specified parameters  Description: Serum creatinine will be within specified parameters  9/30/2020 2157 by Richie Palmer RN  Outcome: Ongoing  Note: Much improve with IV fluids and aponte     Problem: Tissue Perfusion - Renal, Altered:  Goal: Ability to achieve a balanced intake and output will improve  Description: Ability to achieve a balanced intake and output will improve  9/30/2020 2157 by Richie Palmer RN  Outcome: Ongoing  Note: I/O last 3 completed shifts: In: 3447 [P.O.:600; I.V.:873]  Out: 5875 [Urine:5875]  I/O this shift:  In: 0825 [P.O.:840; I.V.:1010]  Out: 1275 [Urine:1275]         Problem: Urinary Elimination:  Goal: Signs and symptoms of infection will decrease  Description: Signs and symptoms of infection will decrease  9/30/2020 2157 by Richie Palmer RN  Note: Aponte catheter in place will be removed OP with Urology for void trial     Problem: Discharge Planning:  Goal: Discharged to appropriate level of care  Description: Discharged to appropriate level of care  9/30/2020 2157 by Richie Palmer RN  Outcome: Ongoing  Note: Patient plans to be discharged to home with family support when medically stable. Care plan reviewed with patient. Patient verbalize understanding of the plan of care and contribute to goal setting.
Problem: Falls - Risk of:  Goal: Will remain free from falls  Description: Will remain free from falls  Outcome: Met This Shift  Note: No fall this shift. Pt is alert and cooperative with use of call light. Frequent checks and hourly rounds to assess needs. Bed alarm on. Problem: Falls - Risk of:  Goal: Absence of physical injury  Description: Absence of physical injury  Outcome: Met This Shift     Problem: Tissue Perfusion - Renal, Altered:  Goal: Electrolytes within specified parameters  Description: Electrolytes within specified parameters  Outcome: Ongoing  Note:   Electrolytes and BUN/Cr coming back to normal levels. Continue IV fluid as ordered and continue to monitor labs     Problem: Tissue Perfusion - Renal, Altered:  Goal: Serum creatinine will be within specified parameters  Description: Serum creatinine will be within specified parameters  Outcome: Ongoing     Problem: Tissue Perfusion - Renal, Altered:  Goal: Ability to achieve a balanced intake and output will improve  Description: Ability to achieve a balanced intake and output will improve  Outcome: Ongoing  Note: Pt drinking a lot but urine output is good as well     Problem: Urinary Elimination:  Goal: Signs and symptoms of infection will decrease  Description: Signs and symptoms of infection will decrease  Outcome: Ongoing  Note: Pt has prostatitis and had urinary retention causing SCOTTIE. Urology to see and evaluate. Will continue to monitor need for aponte and remove when indicated     Problem: Urinary Elimination:  Goal: Complications related to the disease process, condition or treatment will be avoided or minimized  Description: Complications related to the disease process, condition or treatment will be avoided or minimized  Outcome: Ongoing     Care plan reviewed with patient and family. Patient and family verbalize understanding of the plan of care and contribute to goal setting.
Problem: Falls - Risk of:  Goal: Will remain free from falls  Description: Will remain free from falls  Outcome: Ongoing  Note: Call light within reach. Side rails up x2. Bed alarm on. Non skid slippers available. Problem: Falls - Risk of:  Goal: Absence of physical injury  Description: Absence of physical injury  Outcome: Ongoing  Note: There has not been any episodes of physical injury at this time in the shift. Problem: Tissue Perfusion - Renal, Altered:  Goal: Ability to achieve a balanced intake and output will improve  Description: Ability to achieve a balanced intake and output will improve  Outcome: Ongoing  Note: I/O last 3 completed shifts: In: 3752.9 [P.O.:1720; I.V.:2032.9]  Out: 7822 [EKREA:5006]  I/O this shift: In: 12 [P.O.:450]  Out: 0          Problem: Discharge Planning:  Goal: Discharged to appropriate level of care  Description: Discharged to appropriate level of care  Outcome: Ongoing  Note: Patient plans to be discharged to home with family support when medically stable. Care plan reviewed with patient. Patient verbalize understanding of the plan of care and contribute to goal setting.
Problem: Falls - Risk of:  Goal: Will remain free from falls  Description: Will remain free from falls  Outcome: Ongoing  Note: Call light within reach. Side rails up x2. Bed alarm on. Non skid slippers available. Problem: Tissue Perfusion - Renal, Altered:  Goal: Electrolytes within specified parameters  Description: Electrolytes within specified parameters  Outcome: Ongoing  Note: Electrolytes are starting to become within normal limits. Replacing as needed. Will continue to monitor. Problem: Urinary Elimination:  Goal: Signs and symptoms of infection will decrease  Description: Signs and symptoms of infection will decrease  Outcome: Ongoing  Note: No signs or symptoms of infection. No fever or chills. Will continue to assess. Problem: Discharge Planning:  Goal: Discharged to appropriate level of care  Description: Discharged to appropriate level of care  Outcome: Ongoing  Note: Patient plans to be discharged to home when medically stable. Care plan reviewed with patient and family. Patient and family verbalize understanding of the plan of care and contribute to goal setting.
understanding of the plan of care and contribute to goal setting.

## 2020-10-02 NOTE — PROGRESS NOTES
Nephrology Progress Note    Patient - Bryce Marcum   MRN -  [id number]   Acct # - [de-identified]      - 1960    62 y.o. Admit Date: 2020  Hospital Day: 7  Location: -10/010-A  Date of evaluation -  10/2/2020    Subjective:   CC: fatigue  Denies shortness of breath on Room air   UOP 7100/24h, aponte  LR at 125/hr  Large PO fluid intake  BP Range: Systolic (04NKU), UAV:697 , Min:110 , WQY:219      Diastolic (85NZC), OLS:87, Min:70, Max:81    Objective:   VITALS:  /81   Pulse 86   Temp 98 °F (36.7 °C) (Oral)   Resp 16   Ht 6' 2\" (1.88 m)   Wt 253 lb 8 oz (115 kg)   SpO2 97% Comment: o2 not needed at this time per o2 protocol  BMI 32.55 kg/m²    Patient Vitals for the past 24 hrs:   BP Temp Temp src Pulse Resp SpO2   10/02/20 0909 -- -- -- -- -- 97 %   10/02/20 0727 129/81 98 °F (36.7 °C) Oral 86 16 96 %   10/02/20 0336 117/75 98.7 °F (37.1 °C) Oral 78 16 96 %   10/02/20 0015 110/70 99.3 °F (37.4 °C) Oral 91 18 95 %   10/01/20 2114 112/77 98.2 °F (36.8 °C) Oral 86 18 96 %   10/01/20 1555 113/74 98.4 °F (36.9 °C) Oral 100 18 98 %   10/01/20 1129 133/80 -- Oral 59 18 95 %         Intake/Output Summary (Last 24 hours) at 10/2/2020 1058  Last data filed at 10/2/2020 0640  Gross per 24 hour   Intake 4777.45 ml   Output 7150 ml   Net -2372.55 ml       Admission weight: 280 lb (127 kg)  Patient Vitals for the past 96 hrs (Last 3 readings):   Weight   20 0358 253 lb 8 oz (115 kg)   20 0400 254 lb 6.4 oz (115.4 kg)     Body mass index is 32.55 kg/m². EXAM:  CONSTITUTIONAL:  No acute distress. Pleasant  HEENT:  Head is normocephalic, Extraocular movement intact. Neck is supple. Voice is clear. CARDIOVASCULAR:  S1, S2  regular rate and rhythm. RESPIRATORY: Clear to ausculation bilaterally. Equal breath sounds. No wheezes. No shortness of breath noted at rest.  ABDOMEN: soft, non tender  NEUROLOGICAL: Patient is alert and oriented to person, place, and time.  Recent and remote memory intact. Thought is coherant. SKIN: no rash, No significant bruises on exposed surfaces  MUSCULOSKELETAL: Movement is coordinated. Moves all extremities   EXTREMITIES: Distal lower extremity temp is warm, No lower extremity edema. PSYCHIATRIC: mood and affect appropriate.     Medications:   Med reviewed  Scheduled Meds:   potassium & sodium phosphates  1 packet Oral BID    tamsulosin  0.4 mg Oral Daily    potassium bicarb-citric acid  40 mEq Oral Daily    sodium chloride flush  10 mL Intravenous 2 times per day    polyethylene glycol  17 g Oral Daily    heparin (porcine)  5,000 Units Subcutaneous 3 times per day    famotidine (PEPCID) injection  20 mg Intravenous Daily    potassium replacement protocol   Other RX Placeholder    aspirin  81 mg Oral Daily    atorvastatin  20 mg Oral Nightly    multivitamin  1 tablet Oral Daily    insulin lispro  0-6 Units Subcutaneous TID WC    insulin lispro  0-3 Units Subcutaneous Nightly     Continuous Infusions:   lactated ringers 125 mL/hr at 10/01/20 1216    dextrose       PRN Meds sodium chloride flush, acetaminophen **OR** acetaminophen, promethazine **OR** ondansetron, glucose, dextrose, glucagon (rDNA), dextrose, [Held by provider] opium-belladonna   Labs:   Labs reviewed  Lab Results   Component Value Date    ANIONGAP 11.0 10/02/2020     Recent Labs     09/30/20  0619 10/01/20  0559 10/02/20  0611   WBC 9.9 9.2 7.7   RBC 4.15* 4.17* 3.59*   HGB 12.8* 12.7* 11.0*   HCT 39.3* 39.6* 33.9*   MCV 94.7* 95.0* 94.4*   MCH 30.8 30.5 30.6    165 161       Recent Labs     09/30/20  0619 10/01/20  0559 10/02/20  0611    138 141   K 3.3* 3.6 3.6    102 103   CO2 26 28 27   BUN 11 12 10   CREATININE 1.3* 1.3* 1.1   LABGLOM 68* 68* 82*   GLUCOSE 131* 114* 119*   MG 1.8  --   --    CALCIUM 8.1* 8.5 8.2*   CAION 1.05*  --   --        ASSESSMENT:  · Acute Kidney Injury likely 2nd to obstructive uropathy requiring Hemodialysis, resolving and back to

## 2020-10-02 NOTE — PROGRESS NOTES
PROGRESS NOTE      Patient:  [facility]      Unit/Bed:6K-10/010-A    YOB: 1960    MRN: [id number]       Acct: [de-identified]     PCP: Simón Roldan DO    Date of Admission: 9/25/2020      Assessment/Plan:    Anticipated Discharge in : 1-2 days    Active Hospital Problems    Diagnosis Date Noted    Urinary retention [R33.9]     Enlarged prostate [N40.0]     Fatigue [R53.83]     Hyperkalemia [E87.5]     Accelerated hypertension [I10]     SCOTTIE (acute kidney [facility]) ([facility]) [N17.9] 09/25/2020       1. Acute Urinary Retention secondary to outlet obstruction most probably from prostate enlargement:  S/P Garcia catheter placement.  Hold and avoid any anticholinergic medications.  Urology following.  Strict hourly intake and output.  PSA 24.02. Urology plans to remove outpatient after voiding trial follow-up with urology in 1 week. 2. SCOTTIE on CKD, Stage II with Associated Hyperkalemia:  secondary to #1.  Dramatic improvement in kidney function test after Garcia catheter insertion with with urine output 17.1 L over 2 days. Continue potassium replacement, urology and nephrology following. 10/2: Creatinine continues to improve to 1.1. Down 23.5 L total  3. Post-Obstructive Diuresis: Patient is net -17 L this admission and continues to have brisk urine output. He is little bit tachycardic and appears dry. We will continue IV fluids until postobstructive diuresis resolves. Nephrology giving dose of DDAVP will monitor response. 10/2: To receive another dose of DDAVP today. 2 L fluid restriction per nephrology. 4. Hematuria: Improving, secondary to #1. Urology following.    5. History BPH/Prostatitis: Urology following.    6. Accelerated Hypertension: Patient responded well to IVF.  Lopressor IV available. Continue Norvasc.    7.  High Anion Gap Metabolic Acidosis: Secondary to SCOTTIE and tissue hypoxia from hypotension, give sodium bicarb 13 mg twice daily per nephrology recommendations. 8. Normocytic Anemia:  Hemoglobin 11.0; baseline Hg appx. 15.  Dilutional effect.  Monitor hematuria and trend CBC.  Continue multivitamin.     9. BPH/History Prostatitis:  Not on any alpha-1-adrernergic antagonists outpatient.  Consult to Urology as above. 10. Constipation:  KUB performed on 9/26/2020 demonstrates nonobstructive bowel gas pattern . Continue Stool softeners.     11. Hypophosphatemia: Replacing PRN      Chief Complaint: Shortness of breath    Hospital Course: \"62year-old -American male, never smoker, past medical history hypertension, hyperlipidemia, impaired fasting glucose, elevated PSA, anxiety, prostatitis, BPH who presented to [facility] emergency department on 9/25/2020 reporting progressive fatigue, sleeping too much, and dyspnea ongoing for two weeks.  Patient did see his PCP on 9/22/2020 and was diagnosed with anxiety/depression. [facility] has not been able to work due to Lennox Soda works at [email or web address]. Silvia Rodriguez also had urinary frequency with decreased amount and then decreased urine output altogether. [facility] noticed his pants were getting tighter.  The patient has not been taking his prescribed medications.  Garcia catheter was placed in the emergency department and return of 1700 mL's of urine shortly and then per notes other 2200 mL's in the emergency department which was initially yellow than blood tinged.  Patient was taken to Interventional Radiology for placement of temporary hemodialysis catheter per Dr. Avelino Mccarty and subsequently admitted to the Intensive Care unit for further monitoring. \"    Patient with over 4 L of urine output overnight on 10/2/2020. Patient also noted to have over 3 L of water intake over that same time. Nephrology to place another round of DDAVP and place patient on 2 L fluid restriction overnight. Subjective: Patient is doing well today. He states that his urine has been clear today.   Nephrology has been following and is planning on another dose of DDAVP today. Patient denies any other issues. Medications:  Reviewed    Infusion Medications    dextrose       Scheduled Medications    desmopressin  1 mcg Subcutaneous Once    potassium & sodium phosphates  1 packet Oral BID    tamsulosin  0.4 mg Oral Daily    potassium bicarb-citric acid  40 mEq Oral Daily    sodium chloride flush  10 mL Intravenous 2 times per day    polyethylene glycol  17 g Oral Daily    heparin (porcine)  5,000 Units Subcutaneous 3 times per day    famotidine (PEPCID) injection  20 mg Intravenous Daily    potassium replacement protocol   Other RX Placeholder    aspirin  81 mg Oral Daily    atorvastatin  20 mg Oral Nightly    multivitamin  1 tablet Oral Daily    insulin lispro  0-6 Units Subcutaneous TID WC    insulin lispro  0-3 Units Subcutaneous Nightly     PRN Meds: sodium chloride flush, acetaminophen **OR** acetaminophen, promethazine **OR** ondansetron, glucose, dextrose, glucagon (rDNA), dextrose, [Held by provider] opium-belladonna      Intake/Output Summary (Last 24 hours) at 10/2/2020 1341  Last data filed at 10/2/2020 1226  Gross per 24 hour   Intake 4197.45 ml   Output 6550 ml   Net -2352.55 ml       Diet:  DIET GENERAL; No Added Salt (3-4 GM); Daily Fluid Restriction: 2000 ml    Exam:  BP (!) 143/72   Pulse 105   Temp 98.1 °F (36.7 °C) (Axillary)   Resp 18   Ht 6' 2\" (1.88 m)   Wt 253 lb 8 oz (115 kg)   SpO2 97%   BMI 32.55 kg/m²     General appearance: No apparent distress, appears stated age and cooperative. Garcia catheter in place draining clear urine. HEENT: Pupils equal, round, and reactive to light. Conjunctivae/corneas clear. Neck: Supple, with full range of motion. No jugular venous distention. Trachea midline. Respiratory:  Normal respiratory effort. Clear to auscultation, bilaterally without Rales/Wheezes/Rhonchi. Cardiovascular: Regular rate and rhythm with normal S1/S2 without murmurs, rubs or gallops.   Abdomen: Soft, non-tender, non-distended with normal bowel sounds. Musculoskeletal: passive and active ROM x 4 extremities. Skin: Skin color, texture, turgor normal.  No rashes or lesions. Neurologic:  Neurovascularly intact without any focal sensory/motor deficits. Cranial nerves: II-XII intact, grossly non-focal.  Psychiatric: Alert and oriented, thought content appropriate, normal insight  Capillary Refill: Brisk,< 3 seconds   Peripheral Pulses: +2 palpable, equal bilaterally       Labs:   Recent Labs     09/30/20  0619 10/01/20  0559 10/02/20  0611   WBC 9.9 9.2 7.7   HGB 12.8* 12.7* 11.0*   HCT 39.3* 39.6* 33.9*    165 161     Recent Labs     09/30/20  0619 10/01/20  0559 10/02/20  0611    138 141   K 3.3* 3.6 3.6    102 103   CO2 26 28 27   BUN 11 12 10   CREATININE 1.3* 1.3* 1.1   CALCIUM 8.1* 8.5 8.2*   PHOS 2.1* 1.9* 2.4     No results for input(s): AST, ALT, BILIDIR, BILITOT, ALKPHOS in the last 72 hours. No results for input(s): INR in the last 72 hours. No results for input(s): Les Brad in the last 72 hours. Urinalysis:      Lab Results   Component Value Date    NITRU NEGATIVE 09/25/2020    WBCUA 0-2 09/25/2020    BACTERIA NONE SEEN 09/25/2020    RBCUA 0-2 09/25/2020    BLOODU TRACE 09/25/2020    SPECGRAV 1.006 09/04/2020    GLUCOSEU NEGATIVE 09/25/2020       Radiology:  NM KIDNEY W FLOW AND FUNCTION W PHARMACOLOGICAL INTERVENTION   Final Result   1. Prompt and symmetric perfusion to the bilateral kidneys. 2. Differential renal function of 53% for the left kidney and 47% for the right kidney. 3. Delayed clearance of radiotracer from the right kidney with clearance after Lasix and no high-grade obstruction. Marked caliectasis. 4. Prompt uptake, excretion and clearance of radiotracer of the left kidney. Pelvocaliectasis without obstruction. **This report has been created using voice recognition software.   It may contain minor errors which are inherent in voice recognition technology. **      Final report electronically signed by Dr. Kiko Sanford on 9/28/2020 9:28 AM      XR ABDOMEN (KUB) (SINGLE AP VIEW)   Final Result   Nonobstructive bowel gas pattern. **This report has been created using voice recognition software. It may contain minor errors which are inherent in voice recognition technology. **      Final report electronically signed by Dr Devonte Shaikh on 9/26/2020 9:06 AM      US RENAL COMPLETE   Final Result   1. No acute findings. No hydronephrosis. 2. Solitary benign-appearing cyst in each kidney. 3. Small Angiomyolipoma left kidney. **This report has been created using voice recognition software. It may contain minor errors which are inherent in voice recognition technology. **      Final report electronically signed by Dr. Alicia Hensley on 9/26/2020 4:00 AM      IR FLUORO GUIDED CVA DEVICE PLMT/REPLACE/REMOVAL   Final Result   Status post successful nontunneled dialysis catheter insertion. **This report has been created using voice recognition software. It may contain minor errors which are inherent in voice recognition technology. **      Final report electronically signed by Dr Devonte Shaikh on 9/26/2020 7:12 AM      XR CHEST PORTABLE   Final Result   No acute findings               **This report has been created using voice recognition software. It may contain minor errors which are inherent in voice recognition technology. **      Final report electronically signed by Dr. Roanne Romberg on 9/25/2020 7:27 PM          Diet: DIET GENERAL; No Added Salt (3-4 GM);  Daily Fluid Restriction: 2000 ml    DVT prophylaxis: [] Lovenox                                 [] SCDs                                 [x] SQ Heparin                                 [] Encourage ambulation           [] Already on Anticoagulation     Disposition:    [x] Home       [] TCU       [] Rehab       [] Psych       [] SNF       [] [facility]       [] Other-    Code Status: Full Code        Electronically signed by Sophie Delacruz DO on 10/2/2020 at 1:41 PM

## 2020-10-02 NOTE — CARE COORDINATION
10/2/20, 12:05 PM EDT    DISCHARGE ONGOING EVALUATION:     Elina Ventura day: 7  Location: [facility]/010-A Reason for admit: Acute kidney injury [facility]) [N17.9]   Treatment Plan of Care: Ca+ 8.2. DDAVP ordered x1 today. IVF discontinued. Repeat labs in a.m. Barriers to Discharge: not medically ready   PCP: Loyd Her DO  Readmission Risk Score: 16%  Patient Goals/Plan/Treatment Preferences: return home alone with aponte in place.

## 2020-10-03 VITALS
WEIGHT: 255.5 LBS | OXYGEN SATURATION: 98 % | TEMPERATURE: 98.8 F | HEART RATE: 92 BPM | BODY MASS INDEX: 32.79 KG/M2 | DIASTOLIC BLOOD PRESSURE: 68 MMHG | RESPIRATION RATE: 18 BRPM | SYSTOLIC BLOOD PRESSURE: 116 MMHG | HEIGHT: 74 IN

## 2020-10-03 LAB
ANION GAP SERPL CALCULATED.3IONS-SCNC: 10 MEQ/L (ref 8–16)
BUN BLDV-MCNC: 12 MG/DL (ref 7–22)
CALCIUM IONIZED: 1.07 MMOL/L (ref 1.12–1.32)
CALCIUM SERPL-MCNC: 8.7 MG/DL (ref 8.5–10.5)
CHLORIDE BLD-SCNC: 105 MEQ/L (ref 98–111)
CO2: 26 MEQ/L (ref 23–33)
CREAT SERPL-MCNC: 1.1 MG/DL (ref 0.4–1.2)
ERYTHROCYTE [DISTWIDTH] IN BLOOD BY AUTOMATED COUNT: 14 % (ref 11.5–14.5)
ERYTHROCYTE [DISTWIDTH] IN BLOOD BY AUTOMATED COUNT: 48.3 FL (ref 35–45)
GLUCOSE BLD-MCNC: 116 MG/DL (ref 70–108)
GLUCOSE BLD-MCNC: 129 MG/DL (ref 70–108)
GLUCOSE BLD-MCNC: 134 MG/DL (ref 70–108)
HCT VFR BLD CALC: 36 % (ref 42–52)
HEMOGLOBIN: 11.7 GM/DL (ref 14–18)
MAGNESIUM: 1.8 MG/DL (ref 1.6–2.4)
MCH RBC QN AUTO: 30.9 PG (ref 26–33)
MCHC RBC AUTO-ENTMCNC: 32.5 GM/DL (ref 32.2–35.5)
MCV RBC AUTO: 95 FL (ref 80–94)
PHOSPHORUS: 2.4 MG/DL (ref 2.4–4.7)
PLATELET # BLD: 202 THOU/MM3 (ref 130–400)
PMV BLD AUTO: 10.5 FL (ref 9.4–12.4)
POTASSIUM SERPL-SCNC: 3.6 MEQ/L (ref 3.5–5.2)
RBC # BLD: 3.79 MILL/MM3 (ref 4.7–6.1)
SODIUM BLD-SCNC: 141 MEQ/L (ref 135–145)
WBC # BLD: 8.5 THOU/MM3 (ref 4.8–10.8)

## 2020-10-03 PROCEDURE — 82330 ASSAY OF CALCIUM: CPT

## 2020-10-03 PROCEDURE — 2500000003 HC RX 250 WO HCPCS: Performed by: NURSE PRACTITIONER

## 2020-10-03 PROCEDURE — 82948 REAGENT STRIP/BLOOD GLUCOSE: CPT

## 2020-10-03 PROCEDURE — 6370000000 HC RX 637 (ALT 250 FOR IP): Performed by: NURSE PRACTITIONER

## 2020-10-03 PROCEDURE — 85027 COMPLETE CBC AUTOMATED: CPT

## 2020-10-03 PROCEDURE — 6360000002 HC RX W HCPCS: Performed by: NURSE PRACTITIONER

## 2020-10-03 PROCEDURE — 99239 HOSP IP/OBS DSCHRG MGMT >30: CPT | Performed by: FAMILY MEDICINE

## 2020-10-03 PROCEDURE — 2580000003 HC RX 258: Performed by: NURSE PRACTITIONER

## 2020-10-03 PROCEDURE — 36415 COLL VENOUS BLD VENIPUNCTURE: CPT

## 2020-10-03 PROCEDURE — 99232 SBSQ HOSP IP/OBS MODERATE 35: CPT | Performed by: INTERNAL MEDICINE

## 2020-10-03 PROCEDURE — 80048 BASIC METABOLIC PNL TOTAL CA: CPT

## 2020-10-03 PROCEDURE — 84100 ASSAY OF PHOSPHORUS: CPT

## 2020-10-03 PROCEDURE — 83735 ASSAY OF MAGNESIUM: CPT

## 2020-10-03 PROCEDURE — 6370000000 HC RX 637 (ALT 250 FOR IP): Performed by: INTERNAL MEDICINE

## 2020-10-03 RX ORDER — TAMSULOSIN HYDROCHLORIDE 0.4 MG/1
0.4 CAPSULE ORAL DAILY
Qty: 30 CAPSULE | Refills: 3 | Status: SHIPPED | OUTPATIENT
Start: 2020-10-04 | End: 2021-01-11

## 2020-10-03 RX ORDER — POTASSIUM CHLORIDE 20 MEQ/1
20 TABLET, EXTENDED RELEASE ORAL DAILY
Qty: 30 TABLET | Refills: 0 | Status: SHIPPED | OUTPATIENT
Start: 2020-10-03 | End: 2020-11-18 | Stop reason: ALTCHOICE

## 2020-10-03 RX ADMIN — THERA TABS 1 TABLET: TAB at 08:48

## 2020-10-03 RX ADMIN — POTASSIUM BICARBONATE 40 MEQ: 782 TABLET, EFFERVESCENT ORAL at 09:01

## 2020-10-03 RX ADMIN — POTASSIUM & SODIUM PHOSPHATES POWDER PACK 280-160-250 MG 250 MG: 280-160-250 PACK at 08:48

## 2020-10-03 RX ADMIN — HEPARIN SODIUM 5000 UNITS: 5000 INJECTION INTRAVENOUS; SUBCUTANEOUS at 06:33

## 2020-10-03 RX ADMIN — SODIUM CHLORIDE, PRESERVATIVE FREE 10 ML: 5 INJECTION INTRAVENOUS at 08:49

## 2020-10-03 RX ADMIN — ASPIRIN 81 MG: 81 TABLET ORAL at 08:48

## 2020-10-03 RX ADMIN — TAMSULOSIN HYDROCHLORIDE 0.4 MG: 0.4 CAPSULE ORAL at 08:48

## 2020-10-03 RX ADMIN — POLYETHYLENE GLYCOL 3350 17 G: 17 POWDER, FOR SOLUTION ORAL at 08:48

## 2020-10-03 RX ADMIN — FAMOTIDINE 20 MG: 10 INJECTION INTRAVENOUS at 08:48

## 2020-10-03 ASSESSMENT — PAIN SCALES - GENERAL
PAINLEVEL_OUTOF10: 0
PAINLEVEL_OUTOF10: 0

## 2020-10-03 NOTE — PROGRESS NOTES
Discharge teaching and instructions for diagnosis/procedure of acute kidney injury completed with patient using teachback method. AVS reviewed. Instruction provided for aponte care. Patient voiced understanding regarding prescriptions, follow up appointments, and care of self at home. Discharged in a wheelchair to  home with support per family. Patient left in stable condition with all belongings.

## 2020-10-03 NOTE — DISCHARGE SUMMARY
Hyperkalemia:  secondary to #1.  Dramatic improvement in kidney function test after Garcia catheter insertion with with urine output 17.1 L over 2 days.  Continue potassium replacement, urology and nephrology following. 10/2: Creatinine continues to improve to 1.1. Down 23.5 L total  10/3: Creatinine stable at 1.1. Patient down 26.5L total. Nephrology okay with discharge. Patient to be discharged home in stable condition. 3. Post-Obstructive Diuresis: Patient is net -26.5L this admission and continues to have brisk urine output.  He is little bit tachycardic and appears dry.  We will continue IV fluids until postobstructive diuresis resolves.  Nephrology giving dose of DDAVP will monitor response. 10/2: To receive another dose of DDAVP today. 2 L fluid restriction per nephrology. 10/3: Nephrology okay with discharge. 4. Hematuria: Resolved, secondary to #1. Urology to follow as OP  5. History BPH/Prostatitis: Urology will follow as OP.    6. Accelerated Hypertension: Patient responded well to IVF. Continue Norvasc.    7. High Anion Gap Metabolic Acidosis: Secondary to SCOTTIE and tissue hypoxia from hypotension, give sodium bicarb 13 mg twice daily per nephrology recommendations. 8. Normocytic Anemia:  Hemoglobin 11.0; baseline Hg appx. 15.  Dilutional effect.  Monitor hematuria and trend CBC.  Continue multivitamin.     9. BPH/History Prostatitis:  Not on any alpha-1-adrernergic antagonists outpatient.  Consult to Urology as above. 10. Constipation:  KUB performed on 9/26/2020 demonstrates nonobstructive bowel gas pattern . Continue Stool softeners.     11.  Hypophosphatemia: Replacing PRN      Exam:     Vitals:  Vitals:    10/02/20 2313 10/03/20 0330 10/03/20 0830 10/03/20 1106   BP: 121/82 123/68 115/82 116/68   Pulse: 96 79 104 92   Resp: 18 16 16 18   Temp: 97.8 °F (36.6 °C) 98.2 °F (36.8 °C) 98.3 °F (36.8 °C) 98.8 °F (37.1 °C)   TempSrc: Oral Oral Oral Oral   SpO2: 97% 96% 97% 98%   Weight:  255 lb 8 oz (115.9 kg)     Height:         Weight: Weight: 255 lb 8 oz (115.9 kg)     24 hour intake/output:    Intake/Output Summary (Last 24 hours) at 10/3/2020 1522  Last data filed at 10/3/2020 1358  Gross per 24 hour   Intake 1300 ml   Output 5900 ml   Net -4600 ml         General appearance:  No apparent distress, appears stated age and cooperative. Garcia catheter in place draining clear yellow urine  HEENT:  Normal cephalic, atraumatic without obvious deformity. Pupils equal, round, and reactive to light. Extra ocular muscles intact. Conjunctivae/corneas clear. Neck: Supple, with full range of motion. No jugular venous distention. Trachea midline. Respiratory:  Normal respiratory effort. Clear to auscultation, bilaterally without Rales/Wheezes/Rhonchi. Cardiovascular:  Regular rate and rhythm with normal S1/S2 without murmurs, rubs or gallops. Abdomen: Soft, non-tender, non-distended with normal bowel sounds. Musculoskeletal:  No clubbing, cyanosis or edema bilaterally. Full range of motion without deformity. Skin: Skin color, texture, turgor normal.  No rashes or lesions. Neurologic:  Neurovascularly intact without any focal sensory/motor deficits. Cranial nerves: II-XII intact, grossly non-focal.  Psychiatric:  Alert and oriented, thought content appropriate, normal insight  Capillary Refill: Brisk,< 3 seconds   Peripheral Pulses: +2 palpable, equal bilaterally       Labs:  For convenience and continuity at follow-up the following most recent labs are provided:      CBC:    Lab Results   Component Value Date    WBC 8.5 10/03/2020    HGB 11.7 10/03/2020    HCT 36.0 10/03/2020     10/03/2020       Renal:    Lab Results   Component Value Date     10/03/2020    K 3.6 10/03/2020    K 3.6 10/02/2020     10/03/2020    CO2 26 10/03/2020    BUN 12 10/03/2020    CREATININE 1.1 10/03/2020    CALCIUM 8.7 10/03/2020    PHOS 2.4 10/03/2020         Significant Diagnostic Studies    Radiology:   NM KIDNEY W FLOW AND FUNCTION W PHARMACOLOGICAL INTERVENTION   Final Result   1. Prompt and symmetric perfusion to the bilateral kidneys. 2. Differential renal function of 53% for the left kidney and 47% for the right kidney. 3. Delayed clearance of radiotracer from the right kidney with clearance after Lasix and no high-grade obstruction. Marked caliectasis. 4. Prompt uptake, excretion and clearance of radiotracer of the left kidney. Pelvocaliectasis without obstruction. **This report has been created using voice recognition software. It may contain minor errors which are inherent in voice recognition technology. **      Final report electronically signed by Dr. Mirian Kaur on 9/28/2020 9:28 AM      XR ABDOMEN (KUB) (SINGLE AP VIEW)   Final Result   Nonobstructive bowel gas pattern. **This report has been created using voice recognition software. It may contain minor errors which are inherent in voice recognition technology. **      Final report electronically signed by Dr Ana Garcia on 9/26/2020 9:06 AM      US RENAL COMPLETE   Final Result   1. No acute findings. No hydronephrosis. 2. Solitary benign-appearing cyst in each kidney. 3. Small Angiomyolipoma left kidney. **This report has been created using voice recognition software. It may contain minor errors which are inherent in voice recognition technology. **      Final report electronically signed by Dr. Winsome Stephens on 9/26/2020 4:00 AM      IR FLUORO GUIDED CVA DEVICE PLMT/REPLACE/REMOVAL   Final Result   Status post successful nontunneled dialysis catheter insertion. **This report has been created using voice recognition software. It may contain minor errors which are inherent in voice recognition technology. **      Final report electronically signed by Dr Ana Garcia on 9/26/2020 7:12 AM      XR CHEST PORTABLE   Final Result   No acute findings               **This report has been created using voice recognition software. It may contain minor errors which are inherent in voice recognition technology. **      Final report electronically signed by Dr. Solo Lennon on 9/25/2020 7:27 PM             Consults:     [address] [facility] TO NEPHROLOGY    Disposition:    [x] Home       [] TCU       [] Rehab       [] Psych       [] SNF       [] [place]       [] Other-    Condition at Discharge: Stable    Code Status:  Full Code     Patient Instructions:    Discharge lab work: 1 week follow up with Urology. BMP and electrolytes per nephrology  Activity: activity as tolerated  Diet: DIET GENERAL; No Added Salt (3-4 GM)      Follow-up visits:   [address]  [address]  [address] [facility]  Schedule an appointment as soon as possible for a visit in 1 week  void trial, fill and spill. hospital follow up     Nita Acevedo MD  [facility], [address]  [facility] 1440 [id number]    Schedule an appointment as soon as possible for a visit in 1 week  BMP prior to appointment.      [facility]ush Mello [facility], [address] [place] [address]  [phone]    Schedule an appointment as soon as possible for a visit in 1 week  for hospital follow-up         Discharge Medications:      Joshua Coelho"   Home Medication Instructions HIA:[id number]    Printed on:10/03/20 6970   Medication Information                      amLODIPine (NORVASC) 5 MG tablet  TAKE 1 TABLET BY MOUTH ONE TIME A DAY             aspirin 81 MG tablet  Take 81 mg by mouth daily             atorvastatin (LIPITOR) 20 MG tablet  TAKE 1 TABLET BY MOUTH ONE TIME A DAY              MULTIPLE VITAMIN PO  Take  by mouth.             potassium chloride (KLOR-CON M) 20 MEQ extended release tablet  Take 1 tablet by mouth daily             PrednisoLONE Acetate (PRED FORTE OP)  Apply to eye             tamsulosin (FLOMAX) 0.4 MG capsule  Take 1 capsule by mouth daily                 Time Spent on discharge is more than 1 hour in the examination, evaluation, counseling and review of medications and discharge plan. Signed: Thank you [place] DO Hany for the opportunity to be involved in this patient's care.     Electronically signed by Jacqui Esqueda DO on 10/3/2020 at 3:22 PM

## 2020-10-03 NOTE — PROGRESS NOTES
Renal Progress Note  [facility]    Patient :  Alyson Godfrey; 62 y.o. MRN# [id number]  Location:  [facility]  Attending:  Edmar Rojo MD  Admit Date:  9/25/2020   Hospital Day: 8      Subjective:     Nephrology is following the patient for SCOTTIE. Patient seen and examined. Polyuria a little less. He wants to go home. Feeling well. No complaints. Outpatient Medications:     Medications Prior to Admission: amLODIPine (NORVASC) 5 MG tablet, TAKE 1 TABLET BY MOUTH ONE TIME A DAY  atorvastatin (LIPITOR) 20 MG tablet, TAKE 1 TABLET BY MOUTH ONE TIME A DAY   aspirin 81 MG tablet, Take 81 mg by mouth daily  PrednisoLONE Acetate (PRED FORTE OP), Apply to eye  MULTIPLE VITAMIN PO, Take  by mouth. Current Medications:     Scheduled Meds:    potassium & sodium phosphates  1 packet Oral BID    tamsulosin  0.4 mg Oral Daily    potassium bicarb-citric acid  40 mEq Oral Daily    sodium chloride flush  10 mL Intravenous 2 times per day    polyethylene glycol  17 g Oral Daily    heparin (porcine)  5,000 Units Subcutaneous 3 times per day    famotidine (PEPCID) injection  20 mg Intravenous Daily    potassium replacement protocol   Other RX Placeholder    aspirin  81 mg Oral Daily    atorvastatin  20 mg Oral Nightly    multivitamin  1 tablet Oral Daily    insulin lispro  0-6 Units Subcutaneous TID WC    insulin lispro  0-3 Units Subcutaneous Nightly     Continuous Infusions:    dextrose       PRN Meds:  sodium chloride flush, acetaminophen **OR** acetaminophen, promethazine **OR** ondansetron, glucose, dextrose, glucagon (rDNA), dextrose, [Held by provider] opium-belladonna    Input/Output:       I/O last 3 completed shifts: In: 900 [P.O.:900]  Out: 4700 [Urine:4700].       Patient Vitals for the past 96 hrs (Last 3 readings):   Weight   10/03/20 0330 255 lb 8 oz (115.9 kg)   09/30/20 0358 253 lb 8 oz (115 kg)       Vital Signs:   Temperature:  Temp: 98.8 °F (37.1 °C)  TMax:   Temp (24hrs), Av.4 °F (36.9 °C), Min:97.8 °F (36.6 °C), Max:98.8 °F (37.1 °C)    Respirations:  Resp: 18  Pulse:   Pulse: 92  BP:    BP: 116/68  BP Range: Systolic (30VJQ), TP , Min:111 , IYE:222       Diastolic (80FYA), RVO:05, Min:68, Max:82      Physical Examination:     General:  Awake, alert, no acute distress  HEENT: NC/AT/ MMM  Chest:               Clear B/L no W/R/R  Cardiac:  S1 S2   Abdomen: Soft, non-tender,  Neuro:  No facial droop, No Asterixis  SKIN:  No rashes, good skin turgor. Extremities:  No edema, no cyanosis    Labs:       Recent Labs     10/01/20  0559 10/02/20  0611 10/03/20  0622   WBC 9.2 7.7 8.5   RBC 4.17* 3.59* 3.79*   HGB 12.7* 11.0* 11.7*   HCT 39.6* 33.9* 36.0*   MCV 95.0* 94.4* 95.0*   MCH 30.5 30.6 30.9   MCHC 32.1* 32.4 32.5    161 202   MPV 10.8 10.5 10.5      BMP:   Recent Labs     10/01/20  0559 10/02/20  0611 10/03/20  06    141 141   K 3.6 3.6 3.6    103 105   CO2 28 27 26   BUN 12 10 12   CREATININE 1.3* 1.1 1.1   GLUCOSE 114* 119* 116*   CALCIUM 8.5 8.2* 8.7      Phosphorus:     Recent Labs     10/01/20  0559 10/02/20  0611 10/03/20  06   PHOS 1.9* 2.4 2.4     Magnesium:    Recent Labs     10/03/20  0622   MG 1.8     Albumin:  No results for input(s): LABALBU in the last 72 hours.   BNP:    No results found for: BNP  SCOTT:    No results found for: SCOTT  SPEP:  Lab Results   Component Value Date    PROT 6.7 2020     UPEP:   No results found for: LABPE  C3:   No results found for: C3  C4:   No results found for: C4  MPO ANCA:   No results found for: MPO  PR3 ANCA:   No results found for: PR3  Anti-GBM:   No results found for: GBMABIGG  Hep BsAg:       No results found for: HEPBSAG  Hep C AB:        No results found for: HEPCAB    Urinalysis/Chemistries:      Lab Results   Component Value Date    NITRU NEGATIVE 2020    COLORU YELLOW 2020    PHUR 5.5 2020    WBCUA 0-2 2020    RBCUA 0-2 2020    YEAST NONE SEEN 2020 BACTERIA NONE SEEN 09/25/2020    SPECGRAV 1.006 09/04/2020    LEUKOCYTESUR NEGATIVE 09/25/2020    UROBILINOGEN 0.2 09/25/2020    BILIRUBINUR NEGATIVE 09/25/2020    BLOODU TRACE 09/25/2020    GLUCOSEU NEGATIVE 09/25/2020    KETUA TRACE 09/25/2020     Urine Sodium:   No results found for: ASHA  Urine Potassium:  No results found for: KUR  Urine Chloride:  No results found for: CLUR  Urine Osmolarity: No results found for: OSMOU  Urine Protein:   No components found for: TOTALPROTEIN, URINE   Urine Creatinine:   No results found for: LABCREA  Urine Eosinophils:  No components found for: UEOS        Impression and Plan:  1. SCOTTIE due to obstructive uropathy improved s/p aponte catheter  2. S/p hyperkalemia  3. S/p metabolic acidosis  4. Postobstructive diuresis, improving  5. Hypokalemia from postobstructive diruesis  6. Hypophosphatemia  7. BPH with bladder outlet obstruction    Ok for DC from renal standpoint  Would DC on KCl 20 meq daily  Maintain off phos-nak at DC  Repeat bmp early next week and f/u with Dr. Arcelia Lewis in 1 week        Please don't hesitate to call with any questions.   Electronically signed by [id number] Karey White DO on 10/3/2020 at 1:29 PM

## 2020-10-05 ENCOUNTER — NURSE ONLY (OUTPATIENT)
Dept: LAB | Age: 60
End: 2020-10-05

## 2020-10-05 ENCOUNTER — TELEPHONE (OUTPATIENT)
Dept: UROLOGY | Age: 60
End: 2020-10-05

## 2020-10-05 ENCOUNTER — TELEPHONE (OUTPATIENT)
Dept: FAMILY MEDICINE CLINIC | Age: 60
End: 2020-10-05

## 2020-10-05 LAB
ANION GAP SERPL CALCULATED.3IONS-SCNC: 13 MEQ/L (ref 8–16)
BUN BLDV-MCNC: 17 MG/DL (ref 7–22)
CALCIUM SERPL-MCNC: 9.1 MG/DL (ref 8.5–10.5)
CHLORIDE BLD-SCNC: 102 MEQ/L (ref 98–111)
CO2: 22 MEQ/L (ref 23–33)
CREAT SERPL-MCNC: 1.4 MG/DL (ref 0.4–1.2)
GLUCOSE BLD-MCNC: 150 MG/DL (ref 70–108)
PHOSPHORUS: 1.9 MG/DL (ref 2.4–4.7)
POTASSIUM SERPL-SCNC: 3.8 MEQ/L (ref 3.5–5.2)
PROSTATE SPECIFIC ANTIGEN: 20.73 NG/ML (ref 0–1)
SODIUM BLD-SCNC: 137 MEQ/L (ref 135–145)

## 2020-10-05 NOTE — TELEPHONE ENCOUNTER
Patient notified and understanding voiced. He will contact his work regarding.   He is unsure if he needs to have this done or not

## 2020-10-05 NOTE — TELEPHONE ENCOUNTER
[facility] Initial Follow Up Call    Outreach made within 2 business days of discharge: Yes    Patient: Dami Rosario Patient : 1960   MRN: [id number]  Reason for Admission: There are no discharge diagnoses documented for the most recent discharge. Discharge Date: 10/3/20       Spoke with: Patient    Discharge department/facility: [facility]    TCM Interactive Patient Contact:  Was patient able to fill all prescriptions: Yes  Was patient instructed to bring all medications to the follow-up visit: Yes  Is patient taking all medications as directed in the discharge summary?  Yes  Does patient understand their discharge instructions: Yes  Does patient have questions or concerns that need addressed prior to 7-14 day follow up office visit: no    Scheduled appointment with PCP within 7-14 days    Follow Up  Future Appointments   Date Time Provider Velasquez Medina   10/7/2020 10:45 AM MD KENNETH Cummins [facility] - KENNETH LOVEEIN [facility] [place]   10/8/2020  9:30 AM Jose Meza, [address] [place] [address]   10/12/2020 10:00 AM Rajat Law MD [facility] - KENNETH SNOW [facility] [place] [facility]   2021  3:00 PM MD KENNETH Dunbar [facility] - [address] [address]

## 2020-10-05 NOTE — TELEPHONE ENCOUNTER
Pt called office stating his employer says he needs to have a behavioral health assessment done to go back to work. He has never had one of these done before and cannot tell me why they need this assessment done. Please advise.

## 2020-10-05 NOTE — TELEPHONE ENCOUNTER
Per cali note-he was suppose to come in for a void trial today. He did not want to come in today. He wanted to see a doctor. I put him on Dr. Celina Dawkins schedule on Wed. He and wife said he had a critical hospital stay and wanted to see Doctor. He was on Dr Colletta Daub schedule but has never seen Ivan Covington.  Seen Jesus Diop in the past.

## 2020-10-07 ENCOUNTER — TELEPHONE (OUTPATIENT)
Dept: UROLOGY | Age: 60
End: 2020-10-07

## 2020-10-07 ENCOUNTER — HOSPITAL ENCOUNTER (OUTPATIENT)
Age: 60
Setting detail: SPECIMEN
Discharge: HOME | End: 2020-10-07
Payer: COMMERCIAL

## 2020-10-07 ENCOUNTER — OFFICE VISIT (OUTPATIENT)
Dept: UROLOGY | Age: 60
End: 2020-10-07
Payer: COMMERCIAL

## 2020-10-07 ENCOUNTER — TELEPHONE (OUTPATIENT)
Dept: NEPHROLOGY | Age: 60
End: 2020-10-07

## 2020-10-07 VITALS — BODY MASS INDEX: 32.73 KG/M2 | WEIGHT: 255 LBS | HEIGHT: 74 IN

## 2020-10-07 PROCEDURE — 99214 OFFICE O/P EST MOD 30 MIN: CPT | Performed by: UROLOGY

## 2020-10-07 PROCEDURE — 1036F TOBACCO NON-USER: CPT | Performed by: UROLOGY

## 2020-10-07 PROCEDURE — U0003 INFECTIOUS AGENT DETECTION BY NUCLEIC ACID (DNA OR RNA); SEVERE ACUTE RESPIRATORY SYNDROME CORONAVIRUS 2 (SARS-COV-2) (CORONAVIRUS DISEASE [COVID-19]), AMPLIFIED PROBE TECHNIQUE, MAKING USE OF HIGH THROUGHPUT TECHNOLOGIES AS DESCRIBED BY CMS-2020-01-R: HCPCS

## 2020-10-07 PROCEDURE — 51700 IRRIGATION OF BLADDER: CPT | Performed by: UROLOGY

## 2020-10-07 PROCEDURE — 1111F DSCHRG MED/CURRENT MED MERGE: CPT | Performed by: UROLOGY

## 2020-10-07 PROCEDURE — G8417 CALC BMI ABV UP PARAM F/U: HCPCS | Performed by: UROLOGY

## 2020-10-07 PROCEDURE — 3017F COLORECTAL CA SCREEN DOC REV: CPT | Performed by: UROLOGY

## 2020-10-07 PROCEDURE — G8484 FLU IMMUNIZE NO ADMIN: HCPCS | Performed by: UROLOGY

## 2020-10-07 PROCEDURE — G8427 DOCREV CUR MEDS BY ELIG CLIN: HCPCS | Performed by: UROLOGY

## 2020-10-07 RX ORDER — CIPROFLOXACIN 500 MG/1
500 TABLET, FILM COATED ORAL 2 TIMES DAILY
Qty: 6 TABLET | Refills: 0 | Status: SHIPPED | OUTPATIENT
Start: 2020-10-07 | End: 2020-10-10

## 2020-10-07 NOTE — PROGRESS NOTES
Patient has given me verbal consent to perform fill and spill procedure yes    With catheter in place 200 cc water instilled into bladder. Balloon deflated, catheter removed without difficulty. Pt then voided 0 cc.

## 2020-10-07 NOTE — TELEPHONE ENCOUNTER
DO NOT TAKE ASPIRIN, IBUPROFEN, MOTRIN-LIKE DRUGS AND ANY MULTIVITAMINS OR OVER THE COUNTER SUPPLEMENTS 5 DAYS PRIOR TO SURGERY AND 3 DAYS FOLLOWING     Price Uriarte 1960 Diagnosis: Elevated PSA/ Urinary retention    Surgical Physician: Dr. Song Whalen have been scheduled for the procedure marked below:      Surgery: Cystoscopy, Transrectal ultrasound guided prostate biopsy           Date: 10-     Anesthesia: Anesthesiologist (General/Spinal)     Place of Service: [facility] Second Floor Same Day Surgery         Arrive to same day surgery by:  8:30am  (Surgery time is subject to change)      INSTRUCTIONS AS MARKED BELOW:    1. DO NOT eat or drink anything after midnight before surgery. 2. We prefer you shower or bathe with an antibacterial soap (Dial) the morning of surgery. 3.  Please ensure to have a  with you to transport you home. 4.  Please bring a current medication list, photo ID and insurance card(s) with you  5. Okay to take Tylenol  6. Take blood pressure or heart medication as directed, if taken in the morning take with a small sip of water  8. You should receive a follow up appointment upon discharge from the hospital.  If you do not the office will call in 1-2 days to schedule. Start your Cipro on 10- take one in the morning and one in the evening. Take until gone. Do a Enema 3-hours prior to procedure. Around 7:30am    On 10- do your Covid-19 screening.     (Covid-19 screening is date sensitive and can only be done 5 to 7 days prior to your procedure)    Date: 10/7/2020

## 2020-10-07 NOTE — PROGRESS NOTES
Dr. Wilfrido Singer MD  [facility])  Urology Clinic      Patient:  Karin Miranda  YOB: 1960  Date: 10/7/2020    HISTORY OF PRESENT ILLNESS:   The patient is a 62 y.o. male who presents today for follow-up for the following problem(s): elevated PSA with urinary retention. Worsening. 1 week with aponte. On flomax. Had negative prostate biospy in 2014. Here today for voiding trial.   Overall the problem(s) : are worsening. Associated Symptoms: No dysuria, gross hematuria. Pain Severity: 0/10    Summary of old records:   2014 negative prostate biopsy. Retention in 2020. Flomax started. Imaging/Labs reviewed during today's visit:  I have independently reviewed and verified the following films during today's visit. PSA    Last several PSA's:  Lab Results   Component Value Date    PSA 20.73 (H) 10/05/2020    PSA 24.02 (H) 09/26/2020    PSA 7.74 (H) 07/30/2020       Last total testosterone:  No results found for: TESTOSTERONE    Urinalysis today:  No results found for this visit on 10/07/20. Last BUN and creatinine:  Lab Results   Component Value Date    BUN 17 10/05/2020     Lab Results   Component Value Date    CREATININE 1.4 (H) 10/05/2020       Imaging Reviewed during this Office Visit:   (results were independently reviewed by physician and radiology report verified)    PAST MEDICAL, FAMILY AND SOCIAL HISTORY UPDATE:  Past Medical History:   Diagnosis Date    Acute kidney injury ([facility] [address] 9/25/2020    Elevated PSA     Hyperlipidemia LDL goal < 100 2/26/2013    Hypertension     Obesity (BMI 30-39. 9)      Past Surgical History:   Procedure Laterality Date    ACHILLES TENDON SURGERY      CORNEAL TRANSPLANT  7/2013    EYE SURGERY  2014    \"re-work on corneal trasplant\"    AL EGD FLEXIBLE FOREIGN BODY REMOVAL Left 1/18/2018    EGD FOREIGN BODY REMOVAL performed by Juanito Galarza MD at [place] [address]  2-4-2014    bx- negative     Family History   Problem Relation Age of Onset    High Blood Pressure Father     Colon Cancer Neg Hx     Cancer Neg Hx      Outpatient Medications Marked as Taking for the 10/7/20 encounter (Office Visit) with Girma Goff MD   Medication Sig Dispense Refill    potassium & sodium phosphates (PHOS-NAK) 280-160-250 MG PACK Take 1 packet by mouth 3 times daily for 3 days 3 packet 0    tamsulosin (FLOMAX) 0.4 MG capsule Take 1 capsule by mouth daily 30 capsule 3    potassium chloride (KLOR-CON M) 20 MEQ extended release tablet Take 1 tablet by mouth daily 30 tablet 0    PrednisoLONE Acetate (PRED FORTE OP) Apply to eye      MULTIPLE VITAMIN PO Take  by mouth. Lisinopril and Shellfish-derived products  Social History     Tobacco Use   Smoking Status Never Smoker   Smokeless Tobacco Never Used       Social History     Substance and Sexual Activity   Alcohol Use Yes    Comment: 2 drinks a week       REVIEW OF SYSTEMS:  Constitutional: negative  Eyes: negative  Respiratory: negative  Cardiovascular: negative  Gastrointestinal: negative  Musculoskeletal: negative  Genitourinary: negative except for what is in HPI  Skin: negative   Neurological: negative  Hematological/Lymphatic: negative  Psychological: negative    Physical Exam:    There were no vitals filed for this visit. Patient is a 62 y.o. male in no acute distress and alert and oriented to person, place and time. NAD, A/o  Non labored respiration  Normal peripheral pulses  Skin- warm and dry  Psych- normal mood and affect      Assessment and Plan      1. Elevated PSA    2. Hypertrophy of prostate with urinary obstruction    3. Urinary retention           Plan:      No follow-ups on file. Voiding trial today. If passes, follow up in 3 months with PSA and PVR. If he fails, will need cysto and repeat prostate biopsy.           Dr. Girma Goff MD

## 2020-10-07 NOTE — TELEPHONE ENCOUNTER
Lab Results   Component Value Date     10/05/2020    K 3.8 10/05/2020    K 3.6 10/02/2020     10/05/2020    CO2 22 10/05/2020    BUN 17 10/05/2020    CREATININE 1.4 10/05/2020    LABGLOM 51 10/05/2020    GLUCOSE 150 10/05/2020     Lab Results   Component Value Date    PHOS 1.9 10/05/2020    CALCIUM 9.1 10/05/2020       Pls aks pt if he is still drinking a lot and urinating a lot of volume. Is his appetite ok? Pls advise him that his creatinine is 1.4, just above his baseline  His K is ok. Continue to take his K supplement  His Phosphorus is low. Order placed for Phos Nak 3 x a day x 3 doses. Hold his K supplement on day he takes Phos supplement  [facility] FU appt next week with Dr Maxine Mercer.  Repeat BMP and Phos just prior to appt    Future Appointments   Date Time Provider Velasquez Lucianoi   10/7/2020 10:45 AM MD KENNETH Adame [facility] [place] [facility] [place]H [place]OW [facility] [place]   10/8/2020  9:30 AM Kelvin Alvarado DO [address] [facility]   10/12/2020 10:00 AM Rajat Reeves MD [facility] SN[place] [facility] [place]   2/2/2021  3:00 PM MD KENNETH Wade [facility] [place] [facility] KENENTH SNOW [facility] [place] [facility]

## 2020-10-07 NOTE — TELEPHONE ENCOUNTER
Patient scheduled for surgery with Dr. Jacqueline Holland on 10- at 10:30am with an arrival of 8:30am.  Preop orders and instructions given to patient. Surgery consent signed.

## 2020-10-07 NOTE — PROGRESS NOTES
Patient has given me verbal consent to perform catheter placement Yes    Does patient have latex allergy? No  Does patient have shellfish or betadine allergy? No      Following Dr. Lezama plan of care. Inserted 16 Fr  Catheter without difficulty. Cleansed head of penis with betadine swab. 16 Fr regular aponte was inserted without difficulty and inflated balloon with 10 ml of water. Aponte Catheter was hooked up to overnight bag. Pt then wanted overnight bag taped to his leg. Patient instructed on catheter care including draining catheter bag and not pulling on catheter. Gave pt regular size leg bag for backup.

## 2020-10-07 NOTE — TELEPHONE ENCOUNTER
Spoke to patient. He verbalized understanding. Will have blood work completed Sat AM or Monday AM prior to [employer]. States he is drinking and urinating well and has no complaints with his appetite.

## 2020-10-07 NOTE — TELEPHONE ENCOUNTER
SURGERY [address] [place], [place] [address]e Drive [facility], [address]      Phone *[phone]   Surgical Scheduling Direct Line Phone *[phone] Fax *[phone]      Alex Dominguez 1960 male    Letty Anderson [id number] Marital Status:          Home Phone: [phone]      Cell Phone:    Telephone Information:   Mobile [phone]          Surgeon: Dr. Leatha Medrano  Surgery Date: 10-   Time: 10:30am (Following the Robot)    Procedure: Cystoscopy, Transrectal ultrasound guided prostate biopsy    Diagnosis: Elevated PSA/ Urinary retention     Important Medical History: In Epic    Special Inst/Equip: SEEMA Gallego notified in Ultrasound    CPT Codes:    35546, 38176  Latex Allergy:  No     Cardiac Device:  No     Anesthesia:  MAC          Admission Type:  Same Day                             Admit Prior to Day of Surgery: No    Case Location:  Main OR           Preadmission Testing:Phone Call              PAT Date and Time:______________________________________________________    PAT Confirmation #: ______________________________________________________    Post Op Visit: ___________________________________________________________    Need Preop Cardiac Clearance: No    Does Patient have Cardiologist/physician?      None    Surgery Confirmation #: __________________________________________________    : ________________________   Date: __________________________     [employer] Name: Kristi Dietrich

## 2020-10-08 ENCOUNTER — TELEPHONE (OUTPATIENT)
Dept: UROLOGY | Age: 60
End: 2020-10-08

## 2020-10-08 ENCOUNTER — TELEPHONE (OUTPATIENT)
Dept: FAMILY MEDICINE CLINIC | Age: 60
End: 2020-10-08

## 2020-10-08 ENCOUNTER — OFFICE VISIT (OUTPATIENT)
Dept: FAMILY MEDICINE CLINIC | Age: 60
End: 2020-10-08
Payer: COMMERCIAL

## 2020-10-08 VITALS
SYSTOLIC BLOOD PRESSURE: 124 MMHG | BODY MASS INDEX: 33.46 KG/M2 | RESPIRATION RATE: 16 BRPM | TEMPERATURE: 98.8 F | WEIGHT: 260.6 LBS | DIASTOLIC BLOOD PRESSURE: 66 MMHG | HEART RATE: 76 BPM

## 2020-10-08 LAB — CALCIUM IONIZED: NORMAL

## 2020-10-08 PROCEDURE — 99495 TRANSJ CARE MGMT MOD F2F 14D: CPT | Performed by: FAMILY MEDICINE

## 2020-10-08 RX ORDER — CIPROFLOXACIN 500 MG/1
500 TABLET, FILM COATED ORAL 2 TIMES DAILY
Qty: 2 TABLET | Refills: 0 | Status: SHIPPED | OUTPATIENT
Start: 2020-10-13 | End: 2020-10-16

## 2020-10-08 NOTE — TELEPHONE ENCOUNTER
Script for 2 more tablets sent to pharmacy. Please make sure pt knows not to start until the am prior to biopsy and then take twice daily until gone. Please call pharmacy and explain so there is no confusion.

## 2020-10-08 NOTE — TELEPHONE ENCOUNTER
Patient is scheduled for biopsy on 10/14/2020. He didn't realize he wasn';t suppose to start the Cipro and already took 2 doses. I advised him not to take anymore of the Cipro. He needed to take them the day before, day of, and day after. Do you want to take 2 more tablets? Please advise. Thank you.

## 2020-10-08 NOTE — PROGRESS NOTES
Post-Discharge Transitional Care Management Services      Krystian Duty   YOB: 1960    Date of Visit:  10/8/2020  30 Day Post-Discharge Date: 11/3/20    Attending Supervising Physicians Attestation Statement     I have seen and examined the patient on 10/3/2020.  Case and plan has been discussed with the resident Dr. Mcgee La have reviewed hospital course, exam, labs, imaging, assessment/plan and agree with documentation.    Pt admitted with urinary retention due to prostate enlargement. Had significant polyuria and post-obstructive diuresis. Pt is feeling well today. 4.7L u/o last 24 hrs s/p DDAVP again yesterday. Creat stable 1.1, K 3.6, Mg 1.8, phos 2.4, ical 1.07, hgb stable 11.7. Pt was cleared by renal for d/c home with Po KCl supplement 20 mEq daily, hold off on phos-nak and BMP early this next week and f/u Dr. Clay Guidry 1 week and will f/u with urology this week also. Pt was HD stable, afeb, on RA and d/c home in stable condition. Pt is feeling better overall. He has catheter in place. Has follow up scheduled for prostate biopsy. Has follow with Neprho in a few weeks, labs prior. BPs look fine. BP Readings from Last 3 Encounters:   10/08/20 124/66   10/03/20 116/68   08/04/20 138/82     Weight is up.     Wt Readings from Last 3 Encounters:   10/08/20 260 lb 9.6 oz (118.2 kg)   10/07/20 255 lb (115.7 kg)   10/03/20 255 lb 8 oz (115.9 kg)       Allergies   Allergen Reactions    Lisinopril Swelling    Shellfish-Derived Products      Outpatient Medications Marked as Taking for the 10/8/20 encounter (Office Visit) with Simón Roldan, DO   Medication Sig Dispense Refill    potassium & sodium phosphates (PHOS-NAK) 280-160-250 MG PACK Take 1 packet by mouth 3 times daily for 3 days 3 packet 0    ciprofloxacin (CIPRO) 500 MG tablet Take 1 tablet by mouth 2 times daily for 3 days 6 tablet 0    tamsulosin (FLOMAX) 0.4 MG capsule Take 1 capsule by mouth daily 30 capsule 3    potassium chloride (KLOR-CON M) 20 MEQ extended release tablet Take 1 tablet by mouth daily 30 tablet 0    amLODIPine (NORVASC) 5 MG tablet TAKE 1 TABLET BY MOUTH ONE TIME A DAY 90 tablet 3    atorvastatin (LIPITOR) 20 MG tablet TAKE 1 TABLET BY MOUTH ONE TIME A DAY  90 tablet 3    aspirin 81 MG tablet Take 81 mg by mouth daily      PrednisoLONE Acetate (PRED FORTE OP) Apply to eye      MULTIPLE VITAMIN PO Take  by mouth. Vitals:    10/08/20 0930   BP: 124/66   Site: Left Upper Arm   Position: Sitting   Cuff Size: Large Adult   Pulse: 76   Resp: 16   Temp: 98.8 °F (37.1 °C)   TempSrc: Temporal   Weight: 260 lb 9.6 oz (118.2 kg)     Body mass index is 33.46 kg/m². Wt Readings from Last 3 Encounters:   10/08/20 260 lb 9.6 oz (118.2 kg)   10/07/20 255 lb (115.7 kg)   10/03/20 255 lb 8 oz (115.9 kg)     BP Readings from Last 3 Encounters:   10/08/20 124/66   10/03/20 116/68   08/04/20 138/82        Patient was admitted to [address] [place] [address] from 9/25/20 to 10/3/20 for urinary retention, SCOTTIE. Inpatient course: Discharge summary reviewed- see chart. Current status: improved    Review of Systems:  A comprehensive review of systems was negative except for what was noted in the HPI.     Physical Exam:  General Appearance: alert and oriented to person, place and time, well developed and well- nourished, in no acute distress  Skin: warm and dry, no rash or erythema  Head: normocephalic and atraumatic  Eyes: pupils equal, round, and reactive to light, extraocular eye movements intact, conjunctivae normal  ENT: tympanic membrane, external ear and ear canal normal bilaterally, nose without deformity, nasal mucosa and turbinates normal without polyps  Neck: supple and non-tender without mass, no thyromegaly or thyroid nodules, no cervical lymphadenopathy  Pulmonary/Chest: clear to auscultation bilaterally- no wheezes, rales or rhonchi, normal air movement, no respiratory distress  Cardiovascular: normal rate, regular rhythm, normal S1 and S2, no murmurs, rubs, clicks, or gallops, distal pulses intact, no carotid bruits  Abdomen: soft, non-tender, non-distended, normal bowel sounds, no masses or organomegaly  Extremities: no cyanosis, clubbing or edema  Musculoskeletal: normal range of motion, no joint swelling, deformity or tenderness  Neurologic: reflexes normal and symmetric, no cranial nerve deficit, gait, coordination and speech normal    Initial post-discharge communication occurred between medical assistant and patient on 10/5/20- see documentation in chart: telephone encounter.     Assessment/Plan:  Leslie Diaz was seen today for follow-up from hospital.    Diagnoses and all orders for this visit:    Acute urinary retention    SCOTTIE (acute kidney injury) ([address]    Elevated PSA    Enlarged prostate    Hyperkalemia    Benign prostatic hyperplasia with urinary obstruction    Essential hypertension    Hyperlipidemia with target LDL less than 100    IFG (impaired fasting glucose)    Obesity (BMI 35.0-39.9 without comorbidity)          Diagnostic test results reviewed: inpatient labs, culture/micro-urine, EKG, chest x-ray, x-ray-abdomen, US-renal and NM kidney    Patient risk of morbidity and mortality: high    Medical Decision Making: high complexity     -  Acute and chronic medical problems stable  -  Continue current medications  -  Follow up with specialists as scheduled  -  Will hold out of work until 10/26, forms completed  -  RTO prn

## 2020-10-08 NOTE — PROGRESS NOTES
Visit Information    Have you changed or started any medications since your last visit including any over-the-counter medicines, vitamins, or herbal medicines? yes - see updated med list   Are you having any side effects from any of your medications? -  no  Have you stopped taking any of your medications? Is so, why? -  no    Have you seen any other physician or provider since your last visit? Yes - Records Obtained  Have you had any other diagnostic tests since your last visit? Yes - Records Obtained  Have you been seen in the emergency room and/or had an admission to a hospital since we last saw you? Yes - Records Obtained  Have you had your routine dental cleaning in the past 6 months? no    Have you activated your [employer] account? If not, what are your barriers?  Yes     Patient Care Team:  Fany Parmar DO as PCP - General (Family Medicine)  Fany Parmar DO as PCP - [facility]  Celestino Samuels MD as Consulting Physician (Urology)    Medical History Review  Past Medical, Family, and Social History reviewed and does contribute to the patient presenting condition    Health Maintenance   Topic Date Due    Hepatitis C screen  1960    HIV screen  11/24/1973    Shingles Vaccine (1 of 2) 11/24/2008    Lipid screen  07/30/2021    A1C test (Diabetic or Prediabetic)  09/26/2021    PSA counseling  10/05/2021    DTaP/Tdap/Td vaccine (2 - Td) 04/09/2025    Colon cancer screen colonoscopy  09/29/2027    Flu vaccine  Completed    Hepatitis A vaccine  Aged Out    Hepatitis B vaccine  Aged Out    Hib vaccine  Aged Out    Meningococcal (ACWY) vaccine  Aged Out    Pneumococcal 0-64 years Vaccine  Aged Out

## 2020-10-08 NOTE — TELEPHONE ENCOUNTER
Evin Solo at [facility] was advised why the 2 tablets of cipro were ordered. She voiced understanding and stated it was filled and waiting on patient to pick it up. Patient advised to  the cipro. He voiced understanding to start it the day before the procedure and to take the day of and the day after.

## 2020-10-08 NOTE — PROGRESS NOTES
In preparation for their surgical procedure above patient was screened for Obstructive Sleep Apnea (DEANDRE) using the STOP-Bang Questionnaire by the Pre-Admission Testing department. This is a pre-surgical screening tool for patient safety and serves as a recommendation, this WILL NOT cause cancellation of surgery. STOP-Bang Questionnaire  * Do you currently see a pulmonologist?  No     If yes STOP, do not complete. Patient follows with Dr.     1.  Do you snore loudly (able to be heard in the next room)? No    2. Do you often feel tired or sleepy during the daytime? No       3. Has anyone ever told you that you stop breathing during your sleep? No    4. Do you have or are you being treated for high blood pressure? Yes      5. BMI more than 35? BMI (Calculated): 33.5        No    6. Age over 48 years? 62 y.o. Yes    7. Neck Circumference greater than 17 inches for male or 16 inches for female? Measured           (visits only)            Not Applicable    8. Gender Male? Yes      TOTAL SCORE: 3    DEANDRE - Low Risk : Yes to 0 - 2 questions  DEANDRE - Intermediate Risk : Yes to 3 - 4 questions  DEANDRE - High Risk : Yes to 5 - 8 questions    Adapted from:   STOP Questionnaire: A Tool to Screen Patients for Obstructive Sleep Apnea   [facility] JAQUELINE Matamoros.B.B.S., Rajwinder Paez M.D., Crista Issa. Teresa Pichardo, Ph.D., JAQUELINE Gomez.B.B.S., Jah Bowers, M.Sc., Maggie Ho M.D., Alicia Mendoza. SEJAL Kulkarni.P.C.    Anesthesiology 2008; 410:363-55 Copyright 2008, the [address] of Anesthesi[employer], [facility].   ----------------------------------------------------------------------------------------------------------------

## 2020-10-08 NOTE — TELEPHONE ENCOUNTER
Received a call from [facility] with Jason Healy stating that they had faxed over a behavioral health assessment on 10/1/20 and again today for Dr. Mare Velasquez to fill out and fax back. I dont see anything scanned into the chart. Please advise.

## 2020-10-09 ENCOUNTER — PREP FOR PROCEDURE (OUTPATIENT)
Dept: UROLOGY | Age: 60
End: 2020-10-09

## 2020-10-09 ENCOUNTER — TELEPHONE (OUTPATIENT)
Dept: UROLOGY | Age: 60
End: 2020-10-09

## 2020-10-09 LAB — SARS-COV-2: NOT DETECTED

## 2020-10-09 RX ORDER — SODIUM CHLORIDE 9 MG/ML
INJECTION, SOLUTION INTRAVENOUS CONTINUOUS
Status: CANCELLED | OUTPATIENT
Start: 2020-10-14

## 2020-10-10 ENCOUNTER — NURSE ONLY (OUTPATIENT)
Dept: LAB | Age: 60
End: 2020-10-10

## 2020-10-10 LAB
ANION GAP SERPL CALCULATED.3IONS-SCNC: 11 MEQ/L (ref 8–16)
BUN BLDV-MCNC: 12 MG/DL (ref 7–22)
CALCIUM SERPL-MCNC: 9.3 MG/DL (ref 8.5–10.5)
CHLORIDE BLD-SCNC: 109 MEQ/L (ref 98–111)
CO2: 25 MEQ/L (ref 23–33)
CREAT SERPL-MCNC: 1.3 MG/DL (ref 0.4–1.2)
GLUCOSE BLD-MCNC: 98 MG/DL (ref 70–108)
PHOSPHORUS: 2.4 MG/DL (ref 2.4–4.7)
POTASSIUM SERPL-SCNC: 3.8 MEQ/L (ref 3.5–5.2)
PROSTATE SPECIFIC ANTIGEN: 13.93 NG/ML (ref 0–1)
SODIUM BLD-SCNC: 145 MEQ/L (ref 135–145)

## 2020-10-12 ENCOUNTER — OFFICE VISIT (OUTPATIENT)
Dept: NEPHROLOGY | Age: 60
End: 2020-10-12
Payer: COMMERCIAL

## 2020-10-12 VITALS
BODY MASS INDEX: 32.89 KG/M2 | OXYGEN SATURATION: 99 % | HEART RATE: 124 BPM | DIASTOLIC BLOOD PRESSURE: 76 MMHG | TEMPERATURE: 97.5 F | WEIGHT: 256.2 LBS | SYSTOLIC BLOOD PRESSURE: 116 MMHG

## 2020-10-12 PROCEDURE — 1111F DSCHRG MED/CURRENT MED MERGE: CPT | Performed by: INTERNAL MEDICINE

## 2020-10-12 PROCEDURE — 99213 OFFICE O/P EST LOW 20 MIN: CPT | Performed by: INTERNAL MEDICINE

## 2020-10-12 PROCEDURE — 1036F TOBACCO NON-USER: CPT | Performed by: INTERNAL MEDICINE

## 2020-10-12 PROCEDURE — G8484 FLU IMMUNIZE NO ADMIN: HCPCS | Performed by: INTERNAL MEDICINE

## 2020-10-12 PROCEDURE — 3017F COLORECTAL CA SCREEN DOC REV: CPT | Performed by: INTERNAL MEDICINE

## 2020-10-12 PROCEDURE — G8417 CALC BMI ABV UP PARAM F/U: HCPCS | Performed by: INTERNAL MEDICINE

## 2020-10-12 PROCEDURE — G8427 DOCREV CUR MEDS BY ELIG CLIN: HCPCS | Performed by: INTERNAL MEDICINE

## 2020-10-12 NOTE — PROGRESS NOTES
Renal Progress Note    Assessment and Plan:      Diagnosis Orders   1. SCOTTIE (acute kidney injury) ([address]     2. Elevated PSA     3. Essential hypertension     4. Dyslipidemia       PLAN:   I discussed my thoughts with the patient. He understood. Addressed his questions. Labs reviewed. Serum creatinine is stable at 1.3 g/dL. Medications reviewed. No changes. No nonsteroidal anti-inflammatory drugs. List provided to the patient. Return visit in 3 months with labs and 6 months after if not any changes. PSA is being managed by urology. Patient Active Problem List   Diagnosis    HTN (hypertension)    IFG (impaired fasting glucose)    Hyperlipidemia with target LDL less than 100    Elevated PSA    Chronic prostatitis    Benign prostatic hyperplasia with lower urinary tract symptoms    SCOTTIE (acute kidney injury) ([address]    Urinary retention    Enlarged prostate    CKD (chronic kidney disease), stage II    Normocytic anemia    Obesity (BMI 30-39. 9)       Subjective:   Chief complaint:  Chief Complaint   Patient presents with    Follow-Up from Hospital    Chronic Kidney Disease     Stage III      HPI:This is a follow up visit for Mr. Lisa Martinez. This is a post hospital discharge follow-up visit. This is a gentleman that was  admitted to [facility] after he presented there with not feeling well. He was found to have a serum profoundly elevated BUN of 173 mg/dL and  serum creatinine level of 51 mg/L. Serum potassium was 8.1 mg/L. Garcia catheter was placed in the emergency room with 7000 mL of urine obtained at that time. He was never dialyzed. He was thought to have obstructive uropathy but diagnostic studies were negative. He was seen by urology. PSA has been high. Doing well since discharge with no complaint. No chest pain or shortness of breath. No fever or chills. No nausea vomiting. No headaches or blurry vision.   Serum creatinine was 1.4 mg/L on discharge. Today it is 1.3 mg/dl. BUN was 17 mg/L on discharge. Today it is 12 mg/L. Suzy Wren He is still wearing a Garcia catheter. According to him, it was removed by urology but he could not urinate and it was therefore placed back. ROS:Constitutional: negative  Eyes: negative  Ears, nose, mouth, throat, and face: negative  Respiratory: negative  Cardiovascular: negative  Gastrointestinal: positive for reflux symptoms  Genitourinary:positive for hesitancy  Integument/breast: negative  Hematologic/lymphatic: negative  Musculoskeletal:negative  Neurological: negative  Behavioral/Psych: negative  Endocrine: negative  Allergic/Immunologic: negative     Medications:     Current Outpatient Medications   Medication Sig Dispense Refill    [START ON 10/13/2020] ciprofloxacin (CIPRO) 500 MG tablet Take 1 tablet by mouth 2 times daily for 3 days Start taking morning before scheduled biopsy and continue for 3 days. 2 tablet 0    tamsulosin (FLOMAX) 0.4 MG capsule Take 1 capsule by mouth daily 30 capsule 3    potassium chloride (KLOR-CON M) 20 MEQ extended release tablet Take 1 tablet by mouth daily 30 tablet 0    amLODIPine (NORVASC) 5 MG tablet TAKE 1 TABLET BY MOUTH ONE TIME A DAY 90 tablet 3    atorvastatin (LIPITOR) 20 MG tablet TAKE 1 TABLET BY MOUTH ONE TIME A DAY  90 tablet 3    aspirin 81 MG tablet Take 81 mg by mouth daily      PrednisoLONE Acetate (PRED FORTE OP) Apply to eye as needed       MULTIPLE VITAMIN PO Take by mouth daily        No current facility-administered medications for this visit.         Lab Results:    CBC:   Lab Results   Component Value Date    WBC 8.5 10/03/2020    HGB 11.7 (L) 10/03/2020    HCT 36.0 (L) 10/03/2020    MCV 95.0 (H) 10/03/2020     10/03/2020     BMP:    Lab Results   Component Value Date     10/10/2020     10/05/2020     10/03/2020    K 3.8 10/10/2020    K 3.8 10/05/2020    K 3.6 10/03/2020     10/10/2020     10/05/2020     10/03/2020    CO2 25 10/10/2020    CO2 22 (L) 10/05/2020    CO2 26 10/03/2020    BUN 12 10/10/2020    BUN 17 10/05/2020    BUN 12 10/03/2020    CREATININE 1.3 (H) 10/10/2020    CREATININE 1.4 (H) 10/05/2020    CREATININE 1.1 10/03/2020    GLUCOSE 98 10/10/2020    GLUCOSE 150 (H) 10/05/2020    GLUCOSE 116 (H) 10/03/2020      Hepatic:   Lab Results   Component Value Date    AST 30 09/25/2020    AST 25 07/30/2020    AST 22 07/16/2019    ALT 51 09/25/2020    ALT 32 07/30/2020    ALT 21 07/16/2019    BILITOT 0.4 09/25/2020    BILITOT 0.5 07/30/2020    BILITOT 0.5 07/16/2019    ALKPHOS 88 09/25/2020    ALKPHOS 105 07/30/2020    ALKPHOS 89 07/16/2019     BNP: No results found for: BNP  Lipids:   Lab Results   Component Value Date    CHOL 157 07/30/2020    HDL 54 07/30/2020     INR: No results found for: INR  URINE:   Lab Results   Component Value Date    NAUR 73 09/26/2020    PROTUR 442.1 09/26/2020     Lab Results   Component Value Date    NITRU NEGATIVE 09/25/2020    COLORU YELLOW 09/25/2020    PHUR 5.5 09/25/2020    WBCUA 0-2 09/25/2020    RBCUA 0-2 09/25/2020    YEAST NONE SEEN 09/25/2020    BACTERIA NONE SEEN 09/25/2020    SPECGRAV 1.006 09/04/2020    LEUKOCYTESUR NEGATIVE 09/25/2020    UROBILINOGEN 0.2 09/25/2020    BILIRUBINUR NEGATIVE 09/25/2020    BLOODU TRACE 09/25/2020    GLUCOSEU NEGATIVE 09/25/2020    KETUA TRACE 09/25/2020      Microalbumen/Creatinine ratio:  No components found for: RUCREAT    Objective:   Vitals: /76 (Site: Left Upper Arm, Position: Sitting, Cuff Size: Large Adult)   Pulse 124   Temp 97.5 °F (36.4 °C)   Wt 256 lb 3.2 oz (116.2 kg)   SpO2 99%   BMI 32.89 kg/m²      Constitutional:  Alert, awake, no apparent distress  Skin:normal with no rash or any lesions  HEENT:Pupils are reactive . Throat is clear.   Oral mucosa is moist.  Neck:supple with no thyromegaly or bruit   Cardiovascular:  S1, S2 without murmur   Respiratory:  Clear to auscultation with no wheezes or rales  Abdomen: +bowel sound, soft, non tender and no bruit  Ext: No LE edema  Musculoskeletal:Intact  Neuro:Alert, awake and oriented with no obvious focal deficit.   Speech is normal.    Electronically signed by Karen Díaz MD on 10/12/2020 at 10:00 AM

## 2020-10-12 NOTE — PATIENT INSTRUCTIONS
Drugs to Avoid with Chronic Kidney Disease    Non-steroidal anti-inflammatory drugs (NSAIDS)    Potential complication and side effects of NSAIDS include:   Kidney injury and worsening kidney function    Risk of stomach ulcer and intestinal bleeding   Fluid retention and edema   Increased Blood Pressure    Non-Steroidal Anti-Inflammatory Drugs     Aspirin (Anacin, Ascriptin, dominique, Bufferin, Ecotrin, Excedrin)   Celecoxib (Celebrex)   Choline and magnesium salicylates (CMT, Trisosal, Trilisate)   Choline salicylate (Arthropan)   Diclofenac (Voltaren, Arthrotec, Cataflam, Cambia, Voltaren-XR, Zipsor)   Diflunisal (Dolobid)   Etodolac (Lodine XL, Lodine)   Famotidine + Ibuprofen (Duexis)   Fenoprofen (Nalfon, Nalfon 200)   Furbiprofen (Asaid)   Ibuprofen (Advil, Motrin, Midol, Nuprin, Genpril, Dolgesic,Profen,, Vicoprofen,Combunox, Actiprofen, Addaprin, Caldolor, Haltran, Q-Profen,Ibren, Menadol, Rufen,Saleto-200, Shelby,Ultraprin, Uni-Pro,Wal-Profen)   Indomethacin (Indocin, Indomethegan, Indo-Britni)    Ketoprofen (Orudis  KT, Oruvail, Actron)   Ketorolac (Toradol, Sprix)   Magnesium Sulfate (Arthritab, Dominique Select, Shola's pills, Bismark, Mobidin, Mobogesic)   Meclofenamate Sodium (Ponstel)   Meloxicam (Mobic)   Naproxen (Aleve, Anaprox, Naprosyn, EC-Naprosyn, Naprelan, All Day Pain Relief, Aflaxen, Anaprox-DS, Midol Extended Relief, Naprelan,Prevacid NapraPac, Naprapac, Vimovo)   Nabumetone (Relafen)   Oxaprozin (Daypro)   Piroxicam (Feldene)   Rofecoxib (Vioxx)   Salsalate (Amigesic, Anaflex 750, Disalcid, Marthritic, Mono-gesic, Salflex, Salsitab)   Sodium salicylate (various generics)   Sulindac (Clinoril)   Tolmetin (Tolectin)   Valdecoxib (Bextra)   Mefenamic Acid (Ponstel)   Famotidine and Ibuprofen (Duexis)   Meclofenamate (Meclomen)    Antibiotics   Bactrim   Gentamycin   Tobramycin   Vancomycin   Tetracycline   Macrobid     Other                  IV contrast dye

## 2020-10-13 ENCOUNTER — TELEPHONE (OUTPATIENT)
Dept: UROLOGY | Age: 60
End: 2020-10-13

## 2020-10-14 ENCOUNTER — ANESTHESIA EVENT (OUTPATIENT)
Dept: OPERATING ROOM | Age: 60
End: 2020-10-14
Payer: COMMERCIAL

## 2020-10-14 ENCOUNTER — HOSPITAL ENCOUNTER (OUTPATIENT)
Dept: ULTRASOUND IMAGING | Age: 60
Discharge: HOME | End: 2020-10-14
Attending: UROLOGY
Payer: COMMERCIAL

## 2020-10-14 ENCOUNTER — HOSPITAL ENCOUNTER (OUTPATIENT)
Age: 60
Setting detail: OUTPATIENT SURGERY
Discharge: HOME | End: 2020-10-14
Attending: UROLOGY | Admitting: UROLOGY
Payer: COMMERCIAL

## 2020-10-14 ENCOUNTER — ANESTHESIA (OUTPATIENT)
Dept: OPERATING ROOM | Age: 60
End: 2020-10-14
Payer: COMMERCIAL

## 2020-10-14 VITALS — SYSTOLIC BLOOD PRESSURE: 115 MMHG | TEMPERATURE: 96.8 F | OXYGEN SATURATION: 100 % | DIASTOLIC BLOOD PRESSURE: 70 MMHG

## 2020-10-14 VITALS
WEIGHT: 251 LBS | OXYGEN SATURATION: 97 % | HEIGHT: 74 IN | BODY MASS INDEX: 32.21 KG/M2 | TEMPERATURE: 97.9 F | RESPIRATION RATE: 16 BRPM | HEART RATE: 92 BPM | DIASTOLIC BLOOD PRESSURE: 75 MMHG | SYSTOLIC BLOOD PRESSURE: 135 MMHG

## 2020-10-14 PROCEDURE — 2580000003 HC RX 258

## 2020-10-14 PROCEDURE — 6360000002 HC RX W HCPCS: Performed by: NURSE ANESTHETIST, CERTIFIED REGISTERED

## 2020-10-14 PROCEDURE — 3700000001 HC ADD 15 MINUTES (ANESTHESIA): Performed by: UROLOGY

## 2020-10-14 PROCEDURE — 3600000013 HC SURGERY LEVEL 3 ADDTL 15MIN: Performed by: UROLOGY

## 2020-10-14 PROCEDURE — 2500000003 HC RX 250 WO HCPCS: Performed by: NURSE ANESTHETIST, CERTIFIED REGISTERED

## 2020-10-14 PROCEDURE — 3600000003 HC SURGERY LEVEL 3 BASE: Performed by: UROLOGY

## 2020-10-14 PROCEDURE — 7100000011 HC PHASE II RECOVERY - ADDTL 15 MIN: Performed by: UROLOGY

## 2020-10-14 PROCEDURE — 6360000002 HC RX W HCPCS

## 2020-10-14 PROCEDURE — 88305 TISSUE EXAM BY PATHOLOGIST: CPT

## 2020-10-14 PROCEDURE — 76872 US TRANSRECTAL: CPT

## 2020-10-14 PROCEDURE — 7100000010 HC PHASE II RECOVERY - FIRST 15 MIN: Performed by: UROLOGY

## 2020-10-14 PROCEDURE — 3700000000 HC ANESTHESIA ATTENDED CARE: Performed by: UROLOGY

## 2020-10-14 PROCEDURE — 2709999900 HC NON-CHARGEABLE SUPPLY: Performed by: UROLOGY

## 2020-10-14 RX ORDER — SODIUM CHLORIDE 9 MG/ML
INJECTION, SOLUTION INTRAVENOUS CONTINUOUS
Status: DISCONTINUED | OUTPATIENT
Start: 2020-10-14 | End: 2020-10-14 | Stop reason: HOSPADM

## 2020-10-14 RX ORDER — LIDOCAINE HCL/PF 100 MG/5ML
SYRINGE (ML) INJECTION PRN
Status: DISCONTINUED | OUTPATIENT
Start: 2020-10-14 | End: 2020-10-14 | Stop reason: SDUPTHER

## 2020-10-14 RX ORDER — MIDAZOLAM HYDROCHLORIDE 1 MG/ML
INJECTION INTRAMUSCULAR; INTRAVENOUS PRN
Status: DISCONTINUED | OUTPATIENT
Start: 2020-10-14 | End: 2020-10-14 | Stop reason: SDUPTHER

## 2020-10-14 RX ORDER — PROPOFOL 10 MG/ML
INJECTION, EMULSION INTRAVENOUS PRN
Status: DISCONTINUED | OUTPATIENT
Start: 2020-10-14 | End: 2020-10-14 | Stop reason: SDUPTHER

## 2020-10-14 RX ORDER — FENTANYL CITRATE 50 UG/ML
INJECTION, SOLUTION INTRAMUSCULAR; INTRAVENOUS PRN
Status: DISCONTINUED | OUTPATIENT
Start: 2020-10-14 | End: 2020-10-14 | Stop reason: SDUPTHER

## 2020-10-14 RX ADMIN — PROPOFOL 50 MG: 10 INJECTION, EMULSION INTRAVENOUS at 11:05

## 2020-10-14 RX ADMIN — Medication 50 MG: at 11:05

## 2020-10-14 RX ADMIN — SODIUM CHLORIDE: 9 INJECTION, SOLUTION INTRAVENOUS at 09:24

## 2020-10-14 RX ADMIN — CEFAZOLIN 2 G: 10 INJECTION, POWDER, FOR SOLUTION INTRAVENOUS at 10:48

## 2020-10-14 RX ADMIN — FENTANYL CITRATE 50 MCG: 50 INJECTION, SOLUTION INTRAMUSCULAR; INTRAVENOUS at 10:47

## 2020-10-14 RX ADMIN — MIDAZOLAM HYDROCHLORIDE 2 MG: 1 INJECTION, SOLUTION INTRAMUSCULAR; INTRAVENOUS at 10:45

## 2020-10-14 RX ADMIN — PROPOFOL 40 MG: 10 INJECTION, EMULSION INTRAVENOUS at 11:13

## 2020-10-14 RX ADMIN — PROPOFOL 50 MG: 10 INJECTION, EMULSION INTRAVENOUS at 11:07

## 2020-10-14 RX ADMIN — FENTANYL CITRATE 50 MCG: 50 INJECTION, SOLUTION INTRAMUSCULAR; INTRAVENOUS at 10:52

## 2020-10-14 ASSESSMENT — PULMONARY FUNCTION TESTS
PIF_VALUE: 1
PIF_VALUE: 0
PIF_VALUE: 1
PIF_VALUE: 0
PIF_VALUE: 1
PIF_VALUE: 0
PIF_VALUE: 1
PIF_VALUE: 0
PIF_VALUE: 0
PIF_VALUE: 1
PIF_VALUE: 0
PIF_VALUE: 1
PIF_VALUE: 0
PIF_VALUE: 1
PIF_VALUE: 0

## 2020-10-14 ASSESSMENT — PAIN SCALES - GENERAL
PAINLEVEL_OUTOF10: 0

## 2020-10-14 ASSESSMENT — PAIN - FUNCTIONAL ASSESSMENT: PAIN_FUNCTIONAL_ASSESSMENT: 0-10

## 2020-10-14 NOTE — PROGRESS NOTES
ADMITTED TO [facility] AND ORIENTED TO UNIT. SCDS ON. FALL AND ALLERGY BANDS ON. PT VERBALIZED APPROVAL FOR FIRST NAME, LAST INITIAL AND PHYSICIAN NAME ON UNIT WHITEBOARD.

## 2020-10-14 NOTE — ANESTHESIA PRE PROCEDURE
Department of Anesthesiology  Preprocedure Note       Name:  Krystian Pope   Age:  62 y.o.  :  1960                                          MRN:  [id number]         Date:  10/14/2020      Surgeon: Layla Felix):  Farrah Glasgow MD    Procedure: Procedure(s):  CYSTOSCOY, TRANSRECTAL ULTRASOUND GUIDED PROSTATE BIOPSY    Medications prior to admission:   Prior to Admission medications    Medication Sig Start Date End Date Taking? Authorizing Provider   ciprofloxacin (CIPRO) 500 MG tablet Take 1 tablet by mouth 2 times daily for 3 days Start taking morning before scheduled biopsy and continue for 3 days. 10/13/20 10/16/20 Yes Holly Alessandra Fabry, APRN - CNP   tamsulosin (FLOMAX) 0.4 MG capsule Take 1 capsule by mouth daily 10/4/20  Yes Scott Ornelas, DO   potassium chloride (KLOR-CON M) 20 MEQ extended release tablet Take 1 tablet by mouth daily 10/3/20  Yes Scott Ornelas, DO   amLODIPine (NORVASC) 5 MG tablet TAKE 1 TABLET BY MOUTH ONE TIME A DAY 3/17/20  Yes Simón Roldan DO   atorvastatin (LIPITOR) 20 MG tablet TAKE 1 TABLET BY MOUTH ONE TIME A DAY  19  Yes Simón Roldan DO   aspirin 81 MG tablet Take 81 mg by mouth daily   Yes Historical Provider, MD   PrednisoLONE Acetate (PRED FORTE OP) Apply to eye as needed    Yes Historical Provider, MD   MULTIPLE VITAMIN PO Take by mouth daily    Yes Historical Provider, MD       Current medications:    Current Facility-Administered Medications   Medication Dose Route Frequency Provider Last Rate Last Dose    0.9 % sodium chloride infusion   Intravenous Continuous Angelica Frias 100 mL/hr at 10/14/20 0924      ceFAZolin (ANCEF) 2 g in dextrose 5 % 50 mL IVPB  2 g Intravenous 30 Min Pre-Op Angelica Frias           Allergies:     Allergies   Allergen Reactions    Shellfish-Derived Products      Chest tightens and throat swells    Lisinopril Swelling    Nsaids      Due to acute kidney injury        Problem List:    Patient Active Problem List   Diagnosis Code    HTN (hypertension) I10    IFG (impaired fasting glucose) R73.01    Hyperlipidemia with target LDL less than 100 E78.5    Elevated PSA R97.20    Chronic prostatitis N41.1    Benign prostatic hyperplasia with lower urinary tract symptoms N40.1    SCOTTIE (acute kidney injury) ([facility] N17.9    Urinary retention R33.9    Enlarged prostate N40.0    CKD (chronic kidney disease), stage II N18.2    Normocytic anemia D64.9    Obesity (BMI 30-39. 9) E66.9       Past Medical History:        Diagnosis Date    Acute kidney injury ([facility] [address] 9/25/2020    Elevated PSA     Hyperlipidemia LDL goal < 100 2/26/2013    Hypertension     Obesity (BMI 30-39. 9)        Past Surgical History:        Procedure Laterality Date    ACHILLES TENDON SURGERY      CORNEAL TRANSPLANT  7/2013    EYE SURGERY  2014    \"re-work on corneal trasplant\"    [place] EGD FLEXIBLE FOREIGN BODY REMOVAL Left 1/18/2018    EGD FOREIGN BODY REMOVAL performed by Juanito Galarza MD at [place] [address]  2-4-2014    bx- negative       Social History:    Social History     Tobacco Use    Smoking status: Never Smoker    Smokeless tobacco: Never Used   Substance Use Topics    Alcohol use: Yes     Comment: 3 beer per week                                Counseling given: Not Answered      Vital Signs (Current):   Vitals:    10/08/20 1159 10/14/20 0844 10/14/20 0848   BP:  133/74    Pulse:  111    Resp:  18    Temp:  97.3 °F (36.3 °C)    SpO2:  97%    Weight: 260 lb (117.9 kg)  251 lb (113.9 kg)   Height: 6' 2\" (1.88 m)  6' 2\" (1.88 m)                                              BP Readings from Last 3 Encounters:   10/14/20 133/74   10/12/20 116/76   10/08/20 124/66       NPO Status: Time of last liquid consumption: 0730(SIP WITH MEDS)                        Time of last solid consumption: 1900                        Date of last liquid consumption: 10/14/20                        Date of last solid food consumption: 10/13/20    BMI:   Wt Readings from Last 3 Encounters:   10/14/20 251 lb (113.9 kg)   10/12/20 256 lb 3.2 oz (116.2 kg)   10/08/20 260 lb 9.6 oz (118.2 kg)     Body mass index is 32.23 kg/m². CBC:   Lab Results   Component Value Date    WBC 8.5 10/03/2020    RBC 3.79 10/03/2020    HGB 11.7 10/03/2020    HCT 36.0 10/03/2020    MCV 95.0 10/03/2020     10/03/2020       CMP:   Lab Results   Component Value Date     10/10/2020    K 3.8 10/10/2020    K 3.6 10/02/2020     10/10/2020    CO2 25 10/10/2020    BUN 12 10/10/2020    CREATININE 1.3 10/10/2020    LABGLOM 68 10/10/2020    GLUCOSE 98 10/10/2020    PROT 6.7 09/25/2020    CALCIUM 9.3 10/10/2020    BILITOT 0.4 09/25/2020    ALKPHOS 88 09/25/2020    AST 30 09/25/2020    ALT 51 09/25/2020       POC Tests: No results for input(s): POCGLU, POCNA, POCK, POCCL, POCBUN, POCHEMO, POCHCT in the last 72 hours. Coags: No results found for: PROTIME, INR, APTT    HCG (If Applicable): No results found for: PREGTESTUR, PREGSERUM, HCG, HCGQUANT     ABGs: No results found for: PHART, PO2ART, WNK6XML, KLI4KKA, BEART, Q7GPDNHZ     Type & Screen (If Applicable):  No results found for: LABABO, LABRH    Drug/Infectious Status (If Applicable):  No results found for: HIV, HEPCAB    COVID-19 Screening (If Applicable):   Lab Results   Component Value Date    COVID19 Not Detected 10/07/2020         Anesthesia Evaluation    Airway: Mallampati: II       Mouth opening: > = 3 FB Dental:          Pulmonary:                              Cardiovascular:    (+) hypertension:,       ECG reviewed                        Neuro/Psych:      (-) CVA           GI/Hepatic/Renal:   (+) renal disease:,           Endo/Other:                     Abdominal:   (+) obese,         Vascular:                                        Anesthesia Plan      MAC     ASA 3             Anesthetic plan and risks discussed with patient. Plan discussed with CRNA.                   Michael Lind MD 10/14/2020

## 2020-10-14 NOTE — ANESTHESIA POSTPROCEDURE EVALUATION
Department of Anesthesiology  Postprocedure Note    Patient: Prateek Lawrence  MRN: [id number]  YOB: 1960  Date of evaluation: 10/14/2020  Time:  1:32 PM     Procedure Summary     Date:  10/14/20 Room / Location:  [facility] / [facility]    Anesthesia Start:  0482 Anesthesia Stop:  8895    Procedure:  CYSTOSCOY, TRANSRECTAL ULTRASOUND GUIDED PROSTATE BIOPSY (N/A ) Diagnosis:  (ELEVATED PSA, URINARY RETENTION)    Surgeon:  Tess Emanuel MD Responsible Provider:  Myrtie Sever, MD    Anesthesia Type:  MAC ASA Status:  3          Anesthesia Type: MAC    Lexi Phase I: Lexi Score: 10    Lexi Phase II: Lexi Score: 9    Last vitals: Reviewed and per EMR flowsheets.        Anesthesia Post Evaluation

## 2020-10-14 NOTE — PROGRESS NOTES
Pt returned to [facility] room 12. Vitals and assessment as charted. 0.9 infusing,IV patent. Pt has pudding and nellie mist Family at the bedside. Pt and family verbalized understanding of discharge criteria and call light use. Call light in reach.

## 2020-10-14 NOTE — H&P
bilaterally. Psych: Mood and affect normal.  Skin: No rashes or bruising present. Lungs: Respiratory effort normal.  Cardiovascular:  Regular rhythm. Abdomen: Soft, non-tender, non-distended. Neither side has CVA tenderness on exam.      -----------------------------------------------------------------  Imaging Results:  No results found. Assessment and Plan   Impression:    Patient Active Problem List   Diagnosis    HTN (hypertension)    IFG (impaired fasting glucose)    Hyperlipidemia with target LDL less than 100    Elevated PSA    Chronic prostatitis    Benign prostatic hyperplasia with lower urinary tract symptoms    SCOTTIE (acute kidney injury) ([facility] [address]    Urinary retention    Enlarged prostate    CKD (chronic kidney disease), stage II    Normocytic anemia    Obesity (BMI 30-39. 9)   Elevated PSA and urinary retention. Plan: To operating room for cystoscopy and prostate biopsy.         Thiago Nguyễn  11:46 AM 10/14/2020

## 2020-10-14 NOTE — PROGRESS NOTES
Pt has met discharge criteria and states he is ready for discharge to home. IV removed, gauze and tape applied. Dressed in own clothes and personal belongings gathered. Discharge instructions (with opioid medication education information) given to pt and family; pt and family verbalized understanding of discharge instructions, prescriptions and follow up appointments. Pt transported to discharge lobby by [facility] staff.

## 2020-10-15 NOTE — PROGRESS NOTES
Attempted post op call. Pt did not answer and voice mail box was full and unable to leave a message.

## 2020-10-20 ENCOUNTER — TELEPHONE (OUTPATIENT)
Dept: UROLOGY | Age: 60
End: 2020-10-20

## 2020-10-20 NOTE — TELEPHONE ENCOUNTER
Message sent via perfect serve 10/16/2020 12:02 PM     Dr. Carlita Cordero this is [facility]. Your patient Mirian Yesy, Cysto, TRUS biopsy from 10- for elevated psa and urinary retention. I released him to go back to work yesterday. Patient is under the impression he is still off work. Can you please advise.  Thank you Read 10/16/2020 2:09 PM

## 2020-10-21 ENCOUNTER — TELEPHONE (OUTPATIENT)
Dept: UROLOGY | Age: 60
End: 2020-10-21

## 2020-10-21 ENCOUNTER — OFFICE VISIT (OUTPATIENT)
Dept: UROLOGY | Age: 60
End: 2020-10-21
Payer: COMMERCIAL

## 2020-10-21 ENCOUNTER — HOSPITAL ENCOUNTER (OUTPATIENT)
Age: 60
Setting detail: SPECIMEN
Discharge: HOME | End: 2020-10-21
Payer: COMMERCIAL

## 2020-10-21 VITALS — BODY MASS INDEX: 32.21 KG/M2 | WEIGHT: 251 LBS | HEIGHT: 74 IN | TEMPERATURE: 97.4 F

## 2020-10-21 LAB
GFR SERPL CREATININE-BSD FRML MDRD: 10 ML/MIN/1.73M2
GFR SERPL CREATININE-BSD FRML MDRD: 17 ML/MIN/1.73M2
GFR SERPL CREATININE-BSD FRML MDRD: 26 ML/MIN/1.73M2
GFR SERPL CREATININE-BSD FRML MDRD: 3 ML/MIN/1.73M2
GFR SERPL CREATININE-BSD FRML MDRD: 44 ML/MIN/1.73M2
GFR SERPL CREATININE-BSD FRML MDRD: 5 ML/MIN/1.73M2
GFR SERPL CREATININE-BSD FRML MDRD: 50 ML/MIN/1.73M2

## 2020-10-21 PROCEDURE — G8427 DOCREV CUR MEDS BY ELIG CLIN: HCPCS | Performed by: UROLOGY

## 2020-10-21 PROCEDURE — G8417 CALC BMI ABV UP PARAM F/U: HCPCS | Performed by: UROLOGY

## 2020-10-21 PROCEDURE — 99213 OFFICE O/P EST LOW 20 MIN: CPT | Performed by: UROLOGY

## 2020-10-21 PROCEDURE — 1111F DSCHRG MED/CURRENT MED MERGE: CPT | Performed by: UROLOGY

## 2020-10-21 PROCEDURE — G8484 FLU IMMUNIZE NO ADMIN: HCPCS | Performed by: UROLOGY

## 2020-10-21 PROCEDURE — 3017F COLORECTAL CA SCREEN DOC REV: CPT | Performed by: UROLOGY

## 2020-10-21 PROCEDURE — 1036F TOBACCO NON-USER: CPT | Performed by: UROLOGY

## 2020-10-21 PROCEDURE — U0003 INFECTIOUS AGENT DETECTION BY NUCLEIC ACID (DNA OR RNA); SEVERE ACUTE RESPIRATORY SYNDROME CORONAVIRUS 2 (SARS-COV-2) (CORONAVIRUS DISEASE [COVID-19]), AMPLIFIED PROBE TECHNIQUE, MAKING USE OF HIGH THROUGHPUT TECHNOLOGIES AS DESCRIBED BY CMS-2020-01-R: HCPCS

## 2020-10-21 NOTE — LETTER
[facility]  [address] [place] [address]  Phone: [phone]  Fax: [phone]    Tito Byrd MD        October 21, 2020     Patient: Alex Dominguez   YOB: 1960   Date of Visit: 10/21/2020       To Whom It May Concern: It is my medical opinion that Sofya Bond should be off work starting 10- until estimated 11-. .    If you have any questions or concerns, please don't hesitate to call.     Sincerely,            Tito Byrd MD

## 2020-10-21 NOTE — TELEPHONE ENCOUNTER
Patient is scheduled for surgery with Dr. Virginia López on 10- at 11:30am with an arrival of 9:30am. Preop orders and instruction given to patient. Surgery consent signed.

## 2020-10-21 NOTE — PROGRESS NOTES
Dr. Keaton Griggs MD  [facility])  Urology Clinic      Patient:  Brigitte Trotter  YOB: 1960  Date: 10/21/2020    HISTORY OF PRESENT ILLNESS:   The patient is a 62 y.o. male who presents today for follow-up for the following problem(s): elevated PSA with urinary retention associated w BPH. Failed voiding trial. Then had prostate biopsy in Oct 2020 that showed no cancer. Then had cystoscopy which showed severe BPH. We will schedule him for Greenlight. Overall the problem(s) : are stable. Associated Symptoms: No dysuria, gross hematuria. Pain Severity: 0/10    Summary of old records:   2014 & 2020 negative prostate biopsy. Retention in 2020. Imaging/Labs reviewed during today's visit:  I have independently reviewed and verified the following films during today's visit. Prostate pathology: FINAL DIAGNOSIS:   A-J, L.  Prostate, right lateral base, right base, right lateral mid,   right mid, right lateral apex, right apex, left lateral base, left   medial base, left lateral mid, left mid, left apex, biopsies:    Benign prostatic tissue with atrophy and chronic inflammation. Last several PSA's:  Lab Results   Component Value Date    PSA 13.93 (H) 10/10/2020    PSA 20.73 (H) 10/05/2020    PSA 24.02 (H) 09/26/2020       Last total testosterone:  No results found for: TESTOSTERONE    Urinalysis today:  No results found for this visit on 10/21/20. Last BUN and creatinine:  Lab Results   Component Value Date    BUN 12 10/10/2020     Lab Results   Component Value Date    CREATININE 1.3 (H) 10/10/2020       Imaging Reviewed during this Office Visit:   (results were independently reviewed by physician and radiology report verified)    PAST MEDICAL, FAMILY AND SOCIAL HISTORY UPDATE:  Past Medical History:   Diagnosis Date    Acute kidney injury ([address] 9/25/2020    Elevated PSA     Hyperlipidemia LDL goal < 100 2/26/2013    Hypertension     Obesity (BMI 30-39. 9)      Past Surgical History: Procedure Laterality Date    ACHILLES TENDON SURGERY      CORNEAL TRANSPLANT  7/2013    EYE SURGERY  2014    \"re-work on corneal trasplant\"    [place] EGD FLEXIBLE FOREIGN BODY REMOVAL Left 1/18/2018    EGD FOREIGN BODY REMOVAL performed by Aj Wilkinson MD at [place] [address]  2-4-2014    bx- negative    ULTRASOUND PROSTATE/TRANSRECTAL N/A 10/14/2020    CYSTOSCOY, TRANSRECTAL ULTRASOUND GUIDED PROSTATE BIOPSY performed by Allan Velazquez MD at [address] [place] [address] History   Problem Relation Age of Onset    High Blood Pressure Father     Heart Disease Father     Colon Cancer Neg Hx     Cancer Neg Hx     Stroke Neg Hx      Outpatient Medications Marked as Taking for the 10/21/20 encounter (Office Visit) with Allan Velazquez MD   Medication Sig Dispense Refill    tamsulosin (FLOMAX) 0.4 MG capsule Take 1 capsule by mouth daily 30 capsule 3    potassium chloride (KLOR-CON M) 20 MEQ extended release tablet Take 1 tablet by mouth daily 30 tablet 0    amLODIPine (NORVASC) 5 MG tablet TAKE 1 TABLET BY MOUTH ONE TIME A DAY 90 tablet 3    atorvastatin (LIPITOR) 20 MG tablet TAKE 1 TABLET BY MOUTH ONE TIME A DAY  90 tablet 3    aspirin 81 MG tablet Take 81 mg by mouth daily      PrednisoLONE Acetate (PRED FORTE OP) Apply to eye as needed       MULTIPLE VITAMIN PO Take by mouth daily          Shellfish-derived products;  Lisinopril; and Nsaids  Social History     Tobacco Use   Smoking Status Never Smoker   Smokeless Tobacco Never Used       Social History     Substance and Sexual Activity   Alcohol Use Yes    Comment: 3 beer per week       REVIEW OF SYSTEMS:  Constitutional: negative  Eyes: negative  Respiratory: negative  Cardiovascular: negative  Gastrointestinal: negative  Musculoskeletal: negative  Genitourinary: negative except for what is in HPI  Skin: negative   Neurological: negative  Hematological/Lymphatic: negative  Psychological: negative    Physical Exam:      Vitals:    10/21/20 1403 Temp: 97.4 °F (36.3 °C)     Patient is a 62 y.o. male in no acute distress and alert and oriented to person, place and time. NAD, A/o  Non labored respiration  Normal peripheral pulses  Skin- warm and dry  Psych- normal mood and affect      Assessment and Plan      1. Elevated PSA    2. Hypertrophy of prostate with urinary obstruction    3. Urinary retention           Plan:      No follow-ups on file. Biopsy was negative  Schedule for Greenlight PVP.           Dr. Johana Samuels MD

## 2020-10-23 LAB — SARS-COV-2: NOT DETECTED

## 2020-10-26 ENCOUNTER — TELEPHONE (OUTPATIENT)
Dept: UROLOGY | Age: 60
End: 2020-10-26

## 2020-10-26 ENCOUNTER — PREP FOR PROCEDURE (OUTPATIENT)
Dept: UROLOGY | Age: 60
End: 2020-10-26

## 2020-10-26 RX ORDER — SODIUM CHLORIDE 9 MG/ML
INJECTION, SOLUTION INTRAVENOUS CONTINUOUS
Status: CANCELLED | OUTPATIENT
Start: 2020-10-28

## 2020-10-26 NOTE — TELEPHONE ENCOUNTER
[facility] Urology Surgery Preop Check Off    Preop testing- done 2 weeks prior and covid testing 1 week prior to surgery date. Continue to give arrival times and inform patients time is subject to change that day as it gets closer to the day of surgery. PREOP check list      Surgeon Dr. Luz Pierre        Patient Name   Kimberley Neff      30-  MRN   [id number]    DOS    10-  Diagnosis/Indication for Surgery  BPH with retention   Procedure Cystoscopy, Greenlight PVP    Allergies  Shellfish-derived Products  High   Chest tightens and throat swells    Lisinopril  Medium  Swelling     Nsaids  Not Specified   Due to acute kidney injury        UA  Catheter  Urine Culture  Catheter  Blood thinners  N/A     EKG.  2020 Sinus tachycardia otherwise normal  CXR- 2020 no acute findings    COVID  10- Not detected    Clearance:  Cardiac-   Medical -       Pre-Admission to surgery-

## 2020-10-27 ENCOUNTER — TELEPHONE (OUTPATIENT)
Dept: UROLOGY | Age: 60
End: 2020-10-27

## 2020-10-27 NOTE — TELEPHONE ENCOUNTER
Per Demetri Serrano (?) at [employer] code [id number] authorized.   Auth# [id number]

## 2020-10-28 ENCOUNTER — ANESTHESIA (OUTPATIENT)
Dept: OPERATING ROOM | Age: 60
End: 2020-10-28
Payer: COMMERCIAL

## 2020-10-28 ENCOUNTER — HOSPITAL ENCOUNTER (OUTPATIENT)
Age: 60
Setting detail: OUTPATIENT SURGERY
Discharge: HOME | End: 2020-10-28
Attending: UROLOGY | Admitting: UROLOGY
Payer: COMMERCIAL

## 2020-10-28 ENCOUNTER — ANESTHESIA EVENT (OUTPATIENT)
Dept: OPERATING ROOM | Age: 60
End: 2020-10-28
Payer: COMMERCIAL

## 2020-10-28 ENCOUNTER — PATIENT MESSAGE (OUTPATIENT)
Dept: UROLOGY | Age: 60
End: 2020-10-28

## 2020-10-28 VITALS — OXYGEN SATURATION: 97 % | SYSTOLIC BLOOD PRESSURE: 129 MMHG | DIASTOLIC BLOOD PRESSURE: 66 MMHG

## 2020-10-28 VITALS
SYSTOLIC BLOOD PRESSURE: 137 MMHG | TEMPERATURE: 98.4 F | HEIGHT: 74 IN | BODY MASS INDEX: 32.73 KG/M2 | WEIGHT: 255 LBS | HEART RATE: 80 BPM | RESPIRATION RATE: 16 BRPM | DIASTOLIC BLOOD PRESSURE: 87 MMHG | OXYGEN SATURATION: 96 %

## 2020-10-28 LAB
EKG ATRIAL RATE: 97 BPM
EKG P AXIS: 46 DEGREES
EKG P-R INTERVAL: 160 MS
EKG Q-T INTERVAL: 364 MS
EKG QRS DURATION: 94 MS
EKG QTC CALCULATION (BAZETT): 462 MS
EKG R AXIS: 63 DEGREES
EKG VENTRICULAR RATE: 97 BPM

## 2020-10-28 PROCEDURE — 7100000010 HC PHASE II RECOVERY - FIRST 15 MIN: Performed by: UROLOGY

## 2020-10-28 PROCEDURE — 2580000003 HC RX 258

## 2020-10-28 PROCEDURE — 6360000002 HC RX W HCPCS

## 2020-10-28 PROCEDURE — 2709999900 HC NON-CHARGEABLE SUPPLY: Performed by: UROLOGY

## 2020-10-28 PROCEDURE — 7100000000 HC PACU RECOVERY - FIRST 15 MIN: Performed by: UROLOGY

## 2020-10-28 PROCEDURE — 3600000013 HC SURGERY LEVEL 3 ADDTL 15MIN: Performed by: UROLOGY

## 2020-10-28 PROCEDURE — 3700000001 HC ADD 15 MINUTES (ANESTHESIA): Performed by: UROLOGY

## 2020-10-28 PROCEDURE — 3700000000 HC ANESTHESIA ATTENDED CARE: Performed by: UROLOGY

## 2020-10-28 PROCEDURE — 2500000003 HC RX 250 WO HCPCS: Performed by: NURSE ANESTHETIST, CERTIFIED REGISTERED

## 2020-10-28 PROCEDURE — 93005 ELECTROCARDIOGRAM TRACING: CPT | Performed by: ANESTHESIOLOGY

## 2020-10-28 PROCEDURE — 3600000003 HC SURGERY LEVEL 3 BASE: Performed by: UROLOGY

## 2020-10-28 PROCEDURE — 6360000002 HC RX W HCPCS: Performed by: NURSE ANESTHETIST, CERTIFIED REGISTERED

## 2020-10-28 PROCEDURE — 7100000001 HC PACU RECOVERY - ADDTL 15 MIN: Performed by: UROLOGY

## 2020-10-28 PROCEDURE — 7100000011 HC PHASE II RECOVERY - ADDTL 15 MIN: Performed by: UROLOGY

## 2020-10-28 PROCEDURE — 2720000010 HC SURG SUPPLY STERILE: Performed by: UROLOGY

## 2020-10-28 RX ORDER — ONDANSETRON 2 MG/ML
INJECTION INTRAMUSCULAR; INTRAVENOUS PRN
Status: DISCONTINUED | OUTPATIENT
Start: 2020-10-28 | End: 2020-10-28 | Stop reason: SDUPTHER

## 2020-10-28 RX ORDER — MEPERIDINE HYDROCHLORIDE 25 MG/ML
12.5 INJECTION INTRAMUSCULAR; INTRAVENOUS; SUBCUTANEOUS EVERY 5 MIN PRN
Status: DISCONTINUED | OUTPATIENT
Start: 2020-10-28 | End: 2020-10-28 | Stop reason: HOSPADM

## 2020-10-28 RX ORDER — PHENYLEPHRINE HYDROCHLORIDE 10 MG/ML
INJECTION INTRAVENOUS PRN
Status: DISCONTINUED | OUTPATIENT
Start: 2020-10-28 | End: 2020-10-28 | Stop reason: SDUPTHER

## 2020-10-28 RX ORDER — FENTANYL CITRATE 50 UG/ML
50 INJECTION, SOLUTION INTRAMUSCULAR; INTRAVENOUS EVERY 5 MIN PRN
Status: DISCONTINUED | OUTPATIENT
Start: 2020-10-28 | End: 2020-10-28 | Stop reason: HOSPADM

## 2020-10-28 RX ORDER — GENTAMICIN SULFATE 40 MG/ML
INJECTION, SOLUTION INTRAMUSCULAR; INTRAVENOUS PRN
Status: DISCONTINUED | OUTPATIENT
Start: 2020-10-28 | End: 2020-10-28 | Stop reason: SDUPTHER

## 2020-10-28 RX ORDER — SODIUM CHLORIDE 9 MG/ML
INJECTION, SOLUTION INTRAVENOUS CONTINUOUS
Status: DISCONTINUED | OUTPATIENT
Start: 2020-10-28 | End: 2020-10-28 | Stop reason: HOSPADM

## 2020-10-28 RX ORDER — CEPHALEXIN 500 MG/1
500 CAPSULE ORAL 3 TIMES DAILY
Qty: 15 CAPSULE | Refills: 0 | Status: SHIPPED | OUTPATIENT
Start: 2020-10-28 | End: 2020-11-02

## 2020-10-28 RX ORDER — FENTANYL CITRATE 50 UG/ML
INJECTION, SOLUTION INTRAMUSCULAR; INTRAVENOUS PRN
Status: DISCONTINUED | OUTPATIENT
Start: 2020-10-28 | End: 2020-10-28 | Stop reason: SDUPTHER

## 2020-10-28 RX ORDER — ONDANSETRON 2 MG/ML
4 INJECTION INTRAMUSCULAR; INTRAVENOUS
Status: DISCONTINUED | OUTPATIENT
Start: 2020-10-28 | End: 2020-10-28 | Stop reason: HOSPADM

## 2020-10-28 RX ORDER — TRAMADOL HYDROCHLORIDE 50 MG/1
50 TABLET ORAL EVERY 6 HOURS PRN
Qty: 10 TABLET | Refills: 0 | Status: SHIPPED | OUTPATIENT
Start: 2020-10-28 | End: 2020-11-07

## 2020-10-28 RX ORDER — LABETALOL 20 MG/4 ML (5 MG/ML) INTRAVENOUS SYRINGE
5 EVERY 10 MIN PRN
Status: DISCONTINUED | OUTPATIENT
Start: 2020-10-28 | End: 2020-10-28 | Stop reason: HOSPADM

## 2020-10-28 RX ORDER — LIDOCAINE HCL/PF 100 MG/5ML
SYRINGE (ML) INJECTION PRN
Status: DISCONTINUED | OUTPATIENT
Start: 2020-10-28 | End: 2020-10-28 | Stop reason: SDUPTHER

## 2020-10-28 RX ORDER — FENTANYL CITRATE 50 UG/ML
25 INJECTION, SOLUTION INTRAMUSCULAR; INTRAVENOUS EVERY 5 MIN PRN
Status: DISCONTINUED | OUTPATIENT
Start: 2020-10-28 | End: 2020-10-28 | Stop reason: HOSPADM

## 2020-10-28 RX ORDER — HYDRALAZINE HYDROCHLORIDE 20 MG/ML
5 INJECTION INTRAMUSCULAR; INTRAVENOUS EVERY 10 MIN PRN
Status: DISCONTINUED | OUTPATIENT
Start: 2020-10-28 | End: 2020-10-28 | Stop reason: HOSPADM

## 2020-10-28 RX ORDER — PROPOFOL 10 MG/ML
INJECTION, EMULSION INTRAVENOUS PRN
Status: DISCONTINUED | OUTPATIENT
Start: 2020-10-28 | End: 2020-10-28 | Stop reason: SDUPTHER

## 2020-10-28 RX ORDER — DEXAMETHASONE SODIUM PHOSPHATE 4 MG/ML
INJECTION, SOLUTION INTRA-ARTICULAR; INTRALESIONAL; INTRAMUSCULAR; INTRAVENOUS; SOFT TISSUE PRN
Status: DISCONTINUED | OUTPATIENT
Start: 2020-10-28 | End: 2020-10-28 | Stop reason: SDUPTHER

## 2020-10-28 RX ADMIN — FENTANYL CITRATE 50 MCG: 50 INJECTION, SOLUTION INTRAMUSCULAR; INTRAVENOUS at 11:59

## 2020-10-28 RX ADMIN — FENTANYL CITRATE 50 MCG: 50 INJECTION, SOLUTION INTRAMUSCULAR; INTRAVENOUS at 11:57

## 2020-10-28 RX ADMIN — PHENYLEPHRINE HYDROCHLORIDE 100 MCG: 10 INJECTION INTRAVENOUS at 12:09

## 2020-10-28 RX ADMIN — FENTANYL CITRATE 50 MCG: 50 INJECTION, SOLUTION INTRAMUSCULAR; INTRAVENOUS at 12:20

## 2020-10-28 RX ADMIN — ONDANSETRON HYDROCHLORIDE 4 MG: 4 INJECTION, SOLUTION INTRAMUSCULAR; INTRAVENOUS at 12:06

## 2020-10-28 RX ADMIN — Medication 100 MG: at 11:53

## 2020-10-28 RX ADMIN — PROPOFOL 170 MG: 10 INJECTION, EMULSION INTRAVENOUS at 11:53

## 2020-10-28 RX ADMIN — PHENYLEPHRINE HYDROCHLORIDE 100 MCG: 10 INJECTION INTRAVENOUS at 12:05

## 2020-10-28 RX ADMIN — DEXAMETHASONE SODIUM PHOSPHATE 10 MG: 4 INJECTION, SOLUTION INTRAMUSCULAR; INTRAVENOUS at 12:06

## 2020-10-28 RX ADMIN — CEFAZOLIN 2 G: 10 INJECTION, POWDER, FOR SOLUTION INTRAVENOUS at 11:57

## 2020-10-28 RX ADMIN — SODIUM CHLORIDE: 9 INJECTION, SOLUTION INTRAVENOUS at 10:13

## 2020-10-28 RX ADMIN — GENTAMICIN SULFATE 160 MG: 40 INJECTION, SOLUTION INTRAMUSCULAR; INTRAVENOUS at 12:03

## 2020-10-28 ASSESSMENT — PULMONARY FUNCTION TESTS
PIF_VALUE: 6
PIF_VALUE: 3
PIF_VALUE: 0
PIF_VALUE: 6
PIF_VALUE: 7
PIF_VALUE: 14
PIF_VALUE: 13
PIF_VALUE: 1
PIF_VALUE: 6
PIF_VALUE: 14
PIF_VALUE: 12
PIF_VALUE: 7
PIF_VALUE: 12
PIF_VALUE: 14
PIF_VALUE: 12
PIF_VALUE: 4
PIF_VALUE: 12
PIF_VALUE: 6
PIF_VALUE: 6
PIF_VALUE: 12
PIF_VALUE: 12
PIF_VALUE: 6
PIF_VALUE: 12
PIF_VALUE: 13
PIF_VALUE: 6
PIF_VALUE: 12
PIF_VALUE: 6
PIF_VALUE: 0
PIF_VALUE: 12
PIF_VALUE: 5
PIF_VALUE: 12
PIF_VALUE: 3
PIF_VALUE: 10
PIF_VALUE: 4
PIF_VALUE: 11
PIF_VALUE: 12
PIF_VALUE: 13
PIF_VALUE: 6
PIF_VALUE: 12
PIF_VALUE: 11
PIF_VALUE: 2
PIF_VALUE: 6
PIF_VALUE: 1
PIF_VALUE: 11
PIF_VALUE: 13
PIF_VALUE: 6
PIF_VALUE: 12
PIF_VALUE: 1
PIF_VALUE: 6
PIF_VALUE: 2
PIF_VALUE: 6
PIF_VALUE: 3
PIF_VALUE: 12
PIF_VALUE: 6
PIF_VALUE: 7
PIF_VALUE: 11
PIF_VALUE: 5
PIF_VALUE: 6
PIF_VALUE: 8
PIF_VALUE: 2
PIF_VALUE: 12
PIF_VALUE: 12
PIF_VALUE: 0
PIF_VALUE: 10
PIF_VALUE: 2

## 2020-10-28 ASSESSMENT — PAIN - FUNCTIONAL ASSESSMENT: PAIN_FUNCTIONAL_ASSESSMENT: 0-10

## 2020-10-28 ASSESSMENT — PAIN SCALES - GENERAL
PAINLEVEL_OUTOF10: 0

## 2020-10-28 NOTE — PROGRESS NOTES
Pt and family oriented to SDS 3 and unit. Pt verbalized approval for first name, last initial and physician name on unit whiteboard. Fall band applied. Vaccine information given. Plan of care reviewed.

## 2020-10-28 NOTE — OP NOTE
Dr. Trevor Sung MD  Urologic Surgery        [address] [place] [address]. [place]  10/28/20    Patient:  Alyson Godfrey  MRN: [id number]  YOB: 1960    Surgeon: Dr. Trevor Sung MD  Assistant: None    Pre-op Diagnosis: BPH WITH URINARY RETENTION  Post-op Diagnosis: Same    Procedure:   Cystoscopy with Greenlight Photovaporization of the Prostate. Anesthesia: General  Complications: None  OR Blood Loss: Minimal  Fluids: Cystalloids  Implants: 22F 3-way Garcia Catheter  Specimens: None    Indication:  Patient is a pleasant 75-year-old male with elevated PSA and urinary retention. He underwent prostate biopsy which demonstrated negative findings. Due to the urinary retention we did recommend a greenlight photo vaporization of the prostate as his gland was quite large. After risks and benefits were explained he elected to proceed today's procedure. Narrative of the Procedure:    After informed consent was obtained in the preoperative area, the patient was taken back to the operating room and transferred to the operating table in supine position. EPC cuffs were placed. The machine was turned on. Anesthesia was induced and antibiotics were given. The patient was placed in modified dorsal lithotomy position and sterilely prepped and draped in a standard fashion. A timeout occurred. Two patient identifiers were used. The 25F rigid scope with the visual obturator was carefully advanced through the urethra. Once within the bladder the visual obturator was exchanged for the resection bridge. The ureteral orifices were located and noted to be remote from the bladder neck. The prostate was surveyed and the lateral lobes were noted to be completely obstructing. There was not median lobe present. The resection was started proximal with a power setting of 80W. Both the left and right lateral lobes were vaporized in their entirety down to the level of the surgical capsul.  With this complete, the apical dissection was carried out delicately. All of the obstructing adenoma was vaporized. Exquisite care was taken to ensure the integrity of the urinary sphincter. Once completed, the sphincter was evaluated and found to be clear of the resection bed, and completely intact. The anterior adenoma was the last tissue to be addressed. The scope was rotated for ease of resection. One last evaluation of the prostatic urethra demonstrated complete vaporization of the gland to the surgical capsul circumferentially. The scope was advanced into the bladder. The ureteral orifices were re-surveyed and noted to be in pristine condition free of laser scatter. The cystoscope was then removed, and a 22F 3-way Garcia catheter was placed into the bladder. When urine returned, the balloon was instilled with sterile water. The catheter was attached to gentle bladder irrigation using 0.9% normal saline. The catheter was fixed to the leg using a Ricardo strap. The patient was then awakened, extubated, and discharged back to the PACU in good and stable condition. Follow-Up: The bladder will be irrigated in the PACU. As long as the urine remains clear, the irrigation port will be plugged and the patient will be discharged home with plans to follow in 1 day for a outpatient voiding trial.  I will see the patient back personally in 4 weeks.     Shamika Schaefer  Electronically signed on 10/28/2020 at 12:58 PM

## 2020-10-28 NOTE — H&P
Dr. Cristina Thompson MD  [facility])  Urology Clinic      Patient:  Natasha Rossi  YOB: 1960  Date: 10/28/2020    HISTORY OF PRESENT ILLNESS:   The patient is a 62 y.o. male who presents today for follow-up for the following problem(s): elevated PSA with urinary retention associated w BPH. Failed voiding trial. Then had prostate biopsy in Oct 2020 that showed no cancer. Then had cystoscopy which showed severe BPH. We will schedule him for Greenlight. Overall the problem(s) : are stable. Associated Symptoms: No dysuria, gross hematuria. Pain Severity: 0/10    Summary of old records:   2014 & 2020 negative prostate biopsy. Retention in 2020. Imaging/Labs reviewed during today's visit:  I have independently reviewed and verified the following films during today's visit. Prostate pathology: FINAL DIAGNOSIS:   A-J, L.  Prostate, right lateral base, right base, right lateral mid,   right mid, right lateral apex, right apex, left lateral base, left   medial base, left lateral mid, left mid, left apex, biopsies:    Benign prostatic tissue with atrophy and chronic inflammation. Last several PSA's:  Lab Results   Component Value Date    PSA 13.93 (H) 10/10/2020    PSA 20.73 (H) 10/05/2020    PSA 24.02 (H) 09/26/2020       Last total testosterone:  No results found for: TESTOSTERONE    Urinalysis today:  No results found for this visit on 10/21/20. Last BUN and creatinine:  Lab Results   Component Value Date    BUN 12 10/10/2020     Lab Results   Component Value Date    CREATININE 1.3 (H) 10/10/2020       Imaging Reviewed during this Office Visit:   (results were independently reviewed by physician and radiology report verified)    PAST MEDICAL, FAMILY AND SOCIAL HISTORY UPDATE:  Past Medical History:   Diagnosis Date    Acute kidney injury ([address] 9/25/2020    Elevated PSA     Hyperlipidemia LDL goal < 100 2/26/2013    Hypertension     Obesity (BMI 30-39. 9)      Past Surgical History: Procedure Laterality Date    ACHILLES TENDON SURGERY      CORNEAL TRANSPLANT  7/2013    EYE SURGERY  2014    \"re-work on corneal trasplant\"    [place] EGD FLEXIBLE FOREIGN BODY REMOVAL Left 1/18/2018    EGD FOREIGN BODY REMOVAL performed by Mario Phan MD at [place] [address]  2-4-2014    bx- negative    ULTRASOUND PROSTATE/TRANSRECTAL N/A 10/14/2020    CYSTOSCOY, TRANSRECTAL ULTRASOUND GUIDED PROSTATE BIOPSY performed by Keaton Griggs MD at [address] [place] [address] History   Problem Relation Age of Onset    High Blood Pressure Father     Heart Disease Father     Colon Cancer Neg Hx     Cancer Neg Hx     Stroke Neg Hx      Outpatient Medications Marked as Taking for the 10/28/20 encounter [facility] Encounter)   Medication Sig Dispense Refill    tamsulosin (FLOMAX) 0.4 MG capsule Take 1 capsule by mouth daily 30 capsule 3    potassium chloride (KLOR-CON M) 20 MEQ extended release tablet Take 1 tablet by mouth daily 30 tablet 0    amLODIPine (NORVASC) 5 MG tablet TAKE 1 TABLET BY MOUTH ONE TIME A DAY 90 tablet 3    atorvastatin (LIPITOR) 20 MG tablet TAKE 1 TABLET BY MOUTH ONE TIME A DAY  90 tablet 3    PrednisoLONE Acetate (PRED FORTE OP) Apply to eye as needed (right eye)       MULTIPLE VITAMIN PO Take by mouth daily          Shellfish-derived products;  Lisinopril; and Nsaids  Social History     Tobacco Use   Smoking Status Never Smoker   Smokeless Tobacco Never Used       Social History     Substance and Sexual Activity   Alcohol Use Yes    Comment: 3 beer per week       REVIEW OF SYSTEMS:  Constitutional: negative  Eyes: negative  Respiratory: negative  Cardiovascular: negative  Gastrointestinal: negative  Musculoskeletal: negative  Genitourinary: negative except for what is in HPI  Skin: negative   Neurological: negative  Hematological/Lymphatic: negative  Psychological: negative    Physical Exam:      Vitals:    10/28/20 0934   BP: (!) 136/94   Pulse: 107   Resp: 16   Temp: 98.5 °F (36.9 °C)   SpO2: 96%     Patient is a 62 y.o. male in no acute distress and alert and oriented to person, place and time. NAD, A/o  Non labored respiration  Normal peripheral pulses  Skin- warm and dry  Psych- normal mood and affect      Assessment and Plan         Urinary retention. Plan:      No follow-ups on file. Biopsy was negative  Schedule for Greenlight PVP.           Dr. Shamika Schaefer MD

## 2020-10-28 NOTE — PROGRESS NOTES
1256 Pt transferred to PACU, see flow sheet for assessment. 1300 Pt able to awaken on his own, denies pain. CBI continues to run, urine colorless/clear. 1305 DAVID Perdomo rounding and checked on pt -states pt had PVCs prior to procedure.    1327 Pt transferred to [facility], report given to Bing Yang 9723, visitor in room

## 2020-10-28 NOTE — ANESTHESIA POSTPROCEDURE EVALUATION
Department of Anesthesiology  Postprocedure Note    Patient: Dami Rosario  MRN: [id number]  YOB: 1960  Date of evaluation: 10/28/2020  Time:  1:04 PM     Procedure Summary     Date:  10/28/20 Room / Location:  [facility]    Anesthesia Start:  7303 Anesthesia Stop:  3198    Procedure:  CYSTOSCOPY,GREENLIGHT PHOTOVAPORIZATION OF PROSTATE (N/A ) Diagnosis:  (BPH WITH URINARY RETENTION)    Surgeon:  Elisha Nolan MD Responsible Provider:  Nader Crockett DO    Anesthesia Type:  general ASA Status:  2          Anesthesia Type: general    Lexi Phase I: Lexi Score: 5    Lexi Phase II:      Last vitals: Reviewed and per EMR flowsheets.        Anesthesia Post Evaluation    Patient location during evaluation: PACU  Patient participation: complete - patient participated  Level of consciousness: awake  Airway patency: patent  Nausea & Vomiting: no nausea and no vomiting  Complications: no  Cardiovascular status: hemodynamically stable  Respiratory status: acceptable  Hydration status: hypervolemic

## 2020-10-28 NOTE — ANESTHESIA PRE PROCEDURE
 Benign prostatic hyperplasia with lower urinary tract symptoms N40.1    SCOTTIE (acute kidney injury) ([facility] [address] N17.9    Urinary retention R33.9    Enlarged prostate N40.0    CKD (chronic kidney disease), stage II N18.2    Normocytic anemia D64.9    Obesity (BMI 30-39. 9) E66.9       Past Medical History:        Diagnosis Date    Acute kidney injury ([facility] [address] 9/25/2020    Elevated PSA     Hyperlipidemia LDL goal < 100 2/26/2013    Hypertension     Obesity (BMI 30-39. 9)        Past Surgical History:        Procedure Laterality Date    ACHILLES TENDON SURGERY      CORNEAL TRANSPLANT  7/2013    EYE SURGERY  2014    \"re-work on corneal trasplant\"    [place] EGD FLEXIBLE FOREIGN BODY REMOVAL Left 1/18/2018    EGD FOREIGN BODY REMOVAL performed by Ynes Yusuf MD at [place] [address]  2-4-2014    bx- negative    ULTRASOUND PROSTATE/TRANSRECTAL N/A 10/14/2020    CYSTOSCOY, TRANSRECTAL ULTRASOUND GUIDED PROSTATE BIOPSY performed by Richie Wheat MD at [address] [place] [address] History:    Social History     Tobacco Use    Smoking status: Never Smoker    Smokeless tobacco: Never Used   Substance Use Topics    Alcohol use: Yes     Comment: 3 beer per week                                Counseling given: Not Answered      Vital Signs (Current):   Vitals:    10/28/20 0934   BP: (!) 136/94   Pulse: 107   Resp: 16   Temp: 98.5 °F (36.9 °C)   TempSrc: Temporal   SpO2: 96%   Weight: 255 lb (115.7 kg)   Height: 6' 2\" (1.88 m)                                              BP Readings from Last 3 Encounters:   10/28/20 (!) 136/94   10/14/20 135/75   10/14/20 115/70       NPO Status: Time of last liquid consumption: 2300                        Time of last solid consumption: 2300                        Date of last liquid consumption: 10/27/20                        Date of last solid food consumption: 10/27/20    BMI:   Wt Readings from Last 3 Encounters:   10/28/20 255 lb (115.7 kg)   10/21/20 251 lb (113.9 kg) 10/14/20 251 lb (113.9 kg)     Body mass index is 32.74 kg/m². CBC:   Lab Results   Component Value Date    WBC 8.5 10/03/2020    RBC 3.79 10/03/2020    HGB 11.7 10/03/2020    HCT 36.0 10/03/2020    MCV 95.0 10/03/2020     10/03/2020       CMP:   Lab Results   Component Value Date     10/10/2020    K 3.8 10/10/2020    K 3.6 10/02/2020     10/10/2020    CO2 25 10/10/2020    BUN 12 10/10/2020    CREATININE 1.3 10/10/2020    LABGLOM 68 10/10/2020    GLUCOSE 98 10/10/2020    PROT 6.7 09/25/2020    CALCIUM 9.3 10/10/2020    BILITOT 0.4 09/25/2020    ALKPHOS 88 09/25/2020    AST 30 09/25/2020    ALT 51 09/25/2020       POC Tests: No results for input(s): POCGLU, POCNA, POCK, POCCL, POCBUN, POCHEMO, POCHCT in the last 72 hours. Coags: No results found for: PROTIME, INR, APTT    HCG (If Applicable): No results found for: PREGTESTUR, PREGSERUM, HCG, HCGQUANT     ABGs: No results found for: PHART, PO2ART, HRS5OHC, OHU1JOI, BEART, G2CYAQEZ     Type & Screen (If Applicable):  No results found for: LABABO, LABRH    Drug/Infectious Status (If Applicable):  No results found for: HIV, HEPCAB    COVID-19 Screening (If Applicable):   Lab Results   Component Value Date    COVID19 Not Detected 10/21/2020         Anesthesia Evaluation  Patient summary reviewed  Airway: Mallampati: III        Dental: normal exam         Pulmonary:normal exam                               Cardiovascular:    (+) hypertension:, hyperlipidemia      ECG reviewed                     ROS comment: EKG, NSR w/ PVC. Neuro/Psych:               GI/Hepatic/Renal:            ROS comment: Obesity. .   Endo/Other:                     Abdominal:   (+) obese,         Vascular:                                        Anesthesia Plan      general     ASA 2       Induction: intravenous. Anesthetic plan and risks discussed with patient. Use of blood products discussed with patient whom. Plan discussed with JAYCEE Maldonado KIMBERLY,    10/28/2020

## 2020-10-29 ENCOUNTER — NURSE ONLY (OUTPATIENT)
Dept: UROLOGY | Age: 60
End: 2020-10-29
Payer: COMMERCIAL

## 2020-10-29 LAB
GFR SERPL CREATININE-BSD FRML MDRD: 47 ML/MIN/1.73M2
GFR SERPL CREATININE-BSD FRML MDRD: 51 ML/MIN/1.73M2
GFR SERPL CREATININE-BSD FRML MDRD: 56 ML/MIN/1.73M2
GFR SERPL CREATININE-BSD FRML MDRD: 68 ML/MIN/1.73M2
GFR SERPL CREATININE-BSD FRML MDRD: 68 ML/MIN/1.73M2

## 2020-10-29 PROCEDURE — 99999 PR OFFICE/OUTPT VISIT,PROCEDURE ONLY: CPT | Performed by: NURSE PRACTITIONER

## 2020-10-29 PROCEDURE — 51700 IRRIGATION OF BLADDER: CPT | Performed by: NURSE PRACTITIONER

## 2020-10-29 NOTE — PROGRESS NOTES
Patient has given me verbal consent to perform fill and spill procedure YES    With catheter in place 240 cc water instilled into bladder. Balloon deflated, catheter removed without difficulty. Pt then voided 140 cc. Advised per Valentina Steel for patient to still hold his Aspirin. Had Koollaid color urine but no clots were visible. May resume Aspirin on Tuesday. PT to push more fluids in the next few days. To call office for any questions and or concerns. Follow up with  for 4 weeks post-op appt.

## 2020-10-29 NOTE — TELEPHONE ENCOUNTER
From: Jocelyn Kirby  To: Lesley Serrano MD  Sent: 10/28/2020 5:07 PM EDT  Subject: Prescription Question    I went to [address] [place] [address] and they never got my call in prescription!

## 2020-10-29 NOTE — TELEPHONE ENCOUNTER
Patient went to  his prescriptions yesterday and they did not receive an order. It looks like they were printed and not sent via E-prescribing to  [place]. Patient had Cystoscopy with Greenlight Photovaporization of the Prostate yesterday. Message sent to Dr. Zee Heredia per perfect serve. Patient sent a my chart message back stating that he found the rx in his take home bag.   Advised Dr. Zee Heredia that he doesn't need to send rxs

## 2020-11-02 ENCOUNTER — TELEPHONE (OUTPATIENT)
Dept: UROLOGY | Age: 60
End: 2020-11-02

## 2020-11-11 ENCOUNTER — TELEPHONE (OUTPATIENT)
Dept: UROLOGY | Age: 60
End: 2020-11-11

## 2020-11-18 ENCOUNTER — OFFICE VISIT (OUTPATIENT)
Dept: UROLOGY | Age: 60
End: 2020-11-18
Payer: COMMERCIAL

## 2020-11-18 VITALS — WEIGHT: 262 LBS | BODY MASS INDEX: 33.62 KG/M2 | TEMPERATURE: 97.2 F | HEIGHT: 74 IN

## 2020-11-18 PROCEDURE — 81003 URINALYSIS AUTO W/O SCOPE: CPT | Performed by: UROLOGY

## 2020-11-18 PROCEDURE — 99024 POSTOP FOLLOW-UP VISIT: CPT | Performed by: UROLOGY

## 2020-11-18 NOTE — PROGRESS NOTES
Dr. Misael Alberts MD  [facility])  Urology Clinic      Patient:  Kuldeep Mitchell  YOB: 1960  Date: 11/18/2020    HISTORY OF PRESENT ILLNESS:   The patient is a 62 y.o. male who presents today for follow-up for the following problem(s): BPH with outlet obstruction and elevated PSA. Negative biopsy in 2020 and PVP in late 2020 after bioopsy. Having mild UUI but starting to improve. Overall the problem(s) : show no change. Associated Symptoms: No dysuria, gross hematuria. Pain Severity: 0/10    Summary of old records:   Biopsy 2020 - negative  PVP late 2020. Imaging/Labs reviewed during today's visit:  I have independently reviewed and verified the following films during today's visit.   None    Last several PSA's:  Lab Results   Component Value Date    PSA 13.93 (H) 10/10/2020    PSA 20.73 (H) 10/05/2020    PSA 24.02 (H) 09/26/2020       Last total testosterone:  No results found for: TESTOSTERONE    Urinalysis today:  Results for POC orders placed in visit on 11/18/20   POCT Urinalysis No Micro (Auto)   Result Value Ref Range    Glucose, Ur Negative NEGATIVE mg/dl    Bilirubin Urine Negative     Ketones, Urine Negative NEGATIVE    Specific Gravity, Urine 1.020 1.002 - 1.030    Blood, UA POC Moderate (A) NEGATIVE    pH, Urine 7.00 5.0 - 9.0    Protein, Urine Negative NEGATIVE mg/dl    Urobilinogen, Urine 0.20 0.0 - 1.0 eu/dl    Nitrite, Urine Negative NEGATIVE    Leukocyte Clumps, Urine Small (A) NEGATIVE    Color, Urine Yellow YELLOW-STRAW    Character, Urine Clear CLR-SL.CLOUD       Last BUN and creatinine:  Lab Results   Component Value Date    BUN 12 10/10/2020     Lab Results   Component Value Date    CREATININE 1.3 (H) 10/10/2020       Imaging Reviewed during this Office Visit:   (results were independently reviewed by physician and radiology report verified)    PAST MEDICAL, FAMILY AND SOCIAL HISTORY UPDATE:  Past Medical History:   Diagnosis Date    Acute kidney injury ([facility] [address] 9/25/2020    Elevated PSA     Hyperlipidemia LDL goal < 100 2/26/2013    Hypertension     Obesity (BMI 30-39. 9)      Past Surgical History:   Procedure Laterality Date    ACHILLES TENDON SURGERY      CORNEAL TRANSPLANT  7/2013    CYSTOSCOPY N/A 10/28/2020    CYSTOSCOPY,GREENLIGHT PHOTOVAPORIZATION OF PROSTATE performed by Chay Foreman MD at [address] [place] [address] 140  2014    \"re-work on corneal trasplant\"    [place] EGD [address] Left 1/18/2018    EGD FOREIGN BODY REMOVAL performed by David Diane MD at [place] [address]  2-4-2014    bx- negative    ULTRASOUND PROSTATE/TRANSRECTAL N/A 10/14/2020    CYSTOSCOY, TRANSRECTAL ULTRASOUND GUIDED PROSTATE BIOPSY performed by Chay Foreman MD at [address] [place] [address] History   Problem Relation Age of Onset    High Blood Pressure Father     Heart Disease Father     Colon Cancer Neg Hx     Cancer Neg Hx     Stroke Neg Hx      Outpatient Medications Marked as Taking for the 11/18/20 encounter (Office Visit) with Chay Foreman MD   Medication Sig Dispense Refill    tamsulosin (FLOMAX) 0.4 MG capsule Take 1 capsule by mouth daily 30 capsule 3    amLODIPine (NORVASC) 5 MG tablet TAKE 1 TABLET BY MOUTH ONE TIME A DAY 90 tablet 3    atorvastatin (LIPITOR) 20 MG tablet TAKE 1 TABLET BY MOUTH ONE TIME A DAY  90 tablet 3    aspirin 81 MG tablet Take 81 mg by mouth daily      PrednisoLONE Acetate (PRED FORTE OP) Apply to eye as needed (right eye)       MULTIPLE VITAMIN PO Take by mouth daily          Shellfish-derived products;  Lisinopril; and Nsaids  Social History     Tobacco Use   Smoking Status Never Smoker   Smokeless Tobacco Never Used       Social History     Substance and Sexual Activity   Alcohol Use Yes    Alcohol/week: 2.0 standard drinks    Types: 2 Cans of beer per week    Comment: 3 beer per week       REVIEW OF SYSTEMS:  Constitutional: negative  Eyes: negative  Respiratory: negative  Cardiovascular: negative  Gastrointestinal: negative  Musculoskeletal: negative  Genitourinary: negative except for what is in HPI  Skin: negative   Neurological: negative  Hematological/Lymphatic: negative  Psychological: negative    Physical Exam:      Vitals:    11/18/20 1419   Temp: 97.2 °F (36.2 °C)     Patient is a 62 y.o. male in no acute distress and alert and oriented to person, place and time. NAD, A/o  Non labored respiration  Normal peripheral pulses  Skin- warm and dry  Psych- normal mood and affect      Assessment and Plan      1. Benign prostatic hyperplasia with lower urinary tract symptoms, symptom details unspecified    2. Elevated PSA           Plan:      No follow-ups on file. Still having some mild UUI  Stream much improved. Follow up in 4-6 months for recheck and PVR.           Dr. Kelle Curtis MD

## 2020-11-22 ENCOUNTER — PATIENT MESSAGE (OUTPATIENT)
Dept: FAMILY MEDICINE CLINIC | Age: 60
End: 2020-11-22

## 2020-11-23 RX ORDER — ATORVASTATIN CALCIUM 20 MG/1
TABLET, FILM COATED ORAL
Qty: 90 TABLET | Refills: 3 | Status: SHIPPED | OUTPATIENT
Start: 2020-11-23 | End: 2021-07-15 | Stop reason: SDUPTHER

## 2020-11-23 NOTE — TELEPHONE ENCOUNTER
From: Santiago Weir  To: Jackeline Dubose DO  Sent: 2020 6:37 PM EST  Subject: Prescription Question    My prescription of atorvastatin has ! Can you please send a order to [facility] on [address] ?

## 2020-12-01 ENCOUNTER — TELEPHONE (OUTPATIENT)
Dept: UROLOGY | Age: 60
End: 2020-12-01

## 2021-01-04 ENCOUNTER — NURSE ONLY (OUTPATIENT)
Dept: LAB | Age: 61
End: 2021-01-04

## 2021-01-04 DIAGNOSIS — R97.20 ELEVATED PSA: ICD-10-CM

## 2021-01-04 DIAGNOSIS — N17.9 AKI (ACUTE KIDNEY INJURY) (HCC): ICD-10-CM

## 2021-01-04 LAB
ANION GAP SERPL CALCULATED.3IONS-SCNC: 10 MEQ/L (ref 8–16)
BUN BLDV-MCNC: 13 MG/DL (ref 7–22)
CALCIUM SERPL-MCNC: 9.4 MG/DL (ref 8.5–10.5)
CHLORIDE BLD-SCNC: 107 MEQ/L (ref 98–111)
CO2: 21 MEQ/L (ref 23–33)
CREAT SERPL-MCNC: 1.2 MG/DL (ref 0.4–1.2)
GFR SERPL CREATININE-BSD FRML MDRD: 61 ML/MIN/1.73M2
GLUCOSE BLD-MCNC: 122 MG/DL (ref 70–108)
POTASSIUM SERPL-SCNC: 3.7 MEQ/L (ref 3.5–5.2)
PROSTATE SPECIFIC ANTIGEN: 8.32 NG/ML (ref 0–1)
SODIUM BLD-SCNC: 138 MEQ/L (ref 135–145)

## 2021-01-11 ENCOUNTER — OFFICE VISIT (OUTPATIENT)
Dept: NEPHROLOGY | Age: 61
End: 2021-01-11
Payer: COMMERCIAL

## 2021-01-11 VITALS
HEART RATE: 93 BPM | TEMPERATURE: 97.9 F | OXYGEN SATURATION: 97 % | SYSTOLIC BLOOD PRESSURE: 140 MMHG | WEIGHT: 268 LBS | BODY MASS INDEX: 34.41 KG/M2 | DIASTOLIC BLOOD PRESSURE: 103 MMHG

## 2021-01-11 DIAGNOSIS — R97.20 ELEVATED PSA: ICD-10-CM

## 2021-01-11 DIAGNOSIS — I10 ESSENTIAL HYPERTENSION: ICD-10-CM

## 2021-01-11 DIAGNOSIS — E78.5 DYSLIPIDEMIA: ICD-10-CM

## 2021-01-11 DIAGNOSIS — N17.9 AKI (ACUTE KIDNEY INJURY) (HCC): Primary | ICD-10-CM

## 2021-01-11 PROCEDURE — G8417 CALC BMI ABV UP PARAM F/U: HCPCS | Performed by: INTERNAL MEDICINE

## 2021-01-11 PROCEDURE — G8427 DOCREV CUR MEDS BY ELIG CLIN: HCPCS | Performed by: INTERNAL MEDICINE

## 2021-01-11 PROCEDURE — 99214 OFFICE O/P EST MOD 30 MIN: CPT | Performed by: INTERNAL MEDICINE

## 2021-01-11 PROCEDURE — G8484 FLU IMMUNIZE NO ADMIN: HCPCS | Performed by: INTERNAL MEDICINE

## 2021-01-11 PROCEDURE — 3017F COLORECTAL CA SCREEN DOC REV: CPT | Performed by: INTERNAL MEDICINE

## 2021-01-11 PROCEDURE — 1036F TOBACCO NON-USER: CPT | Performed by: INTERNAL MEDICINE

## 2021-01-11 NOTE — PROGRESS NOTES
Renal Progress Note    Assessment and Plan:      Diagnosis Orders   1. SCOTTIE (acute kidney injury) ([facility] [address]     2. Essential hypertension     3. Dyslipidemia     4. Elevated PSA       PLAN:   Lab result discussed with the patient. He understood. I addressed his questions. Serum creatinine is slightly improved to 1.2 mg/L from 1.0 g/L. Medications reviewed  No changes  Continue to avoid nonsteroidal anti-inflammatory drugs and other nephrotoxic agents  Return visit in 6 months and as needed thereafter if the serum creatinine remains at the current level  Discussed with the patient    Patient Active Problem List   Diagnosis    HTN (hypertension)    IFG (impaired fasting glucose)    Hyperlipidemia with target LDL less than 100    Elevated PSA    Chronic prostatitis    Benign prostatic hyperplasia with lower urinary tract symptoms    SCOTTIE (acute kidney injury) ([facility] [address]    Urinary retention    Enlarged prostate    CKD (chronic kidney disease), stage II    Normocytic anemia    Obesity (BMI 30-39. 9)       Subjective:   Chief complaint:  Chief Complaint   Patient presents with    Chronic Kidney Disease     Stage II      HPI:This is a follow up visit for Mr. Salvador Borden who is here today for return appointment. I see him for acute kidney injury now acute kidney disease. He was last seen about 3 months ago. Serum creatinine was 1.3 mg/L. Today it is 1.2 mg/L. Diastolic blood pressure is high in the office. However at home they are normal according to him. He is officially retired now. .  Denies any chest pain. No shortness of breath. No nausea vomiting. No fever chills. No headaches. ROS: As in the history of present illness. Other systems are negative.   Medications:     Current Outpatient Medications   Medication Sig Dispense Refill    atorvastatin (LIPITOR) 20 MG tablet TAKE 1 TABLET BY MOUTH ONE TIME A DAY 90 tablet 3    amLODIPine (NORVASC) 5 MG tablet TAKE 1 TABLET BY MOUTH ONE TIME A DAY 90 tablet 3    aspirin 81 MG tablet Take 81 mg by mouth daily      MULTIPLE VITAMIN PO Take by mouth daily        No current facility-administered medications for this visit.         Lab Results:    CBC:   Lab Results   Component Value Date    WBC 8.5 10/03/2020    HGB 11.7 (L) 10/03/2020    HCT 36.0 (L) 10/03/2020    MCV 95.0 (H) 10/03/2020     10/03/2020     BMP:    Lab Results   Component Value Date     01/04/2021     10/10/2020     10/05/2020    K 3.7 01/04/2021    K 3.8 10/10/2020    K 3.8 10/05/2020     01/04/2021     10/10/2020     10/05/2020    CO2 21 (L) 01/04/2021    CO2 25 10/10/2020    CO2 22 (L) 10/05/2020    BUN 13 01/04/2021    BUN 12 10/10/2020    BUN 17 10/05/2020    CREATININE 1.2 01/04/2021    CREATININE 1.3 (H) 10/10/2020    CREATININE 1.4 (H) 10/05/2020    GLUCOSE 122 (H) 01/04/2021    GLUCOSE 98 10/10/2020    GLUCOSE 150 (H) 10/05/2020      Hepatic:   Lab Results   Component Value Date    AST 30 09/25/2020    AST 25 07/30/2020    AST 22 07/16/2019    ALT 51 09/25/2020    ALT 32 07/30/2020    ALT 21 07/16/2019    BILITOT 0.4 09/25/2020    BILITOT 0.5 07/30/2020    BILITOT 0.5 07/16/2019    ALKPHOS 88 09/25/2020    ALKPHOS 105 07/30/2020    ALKPHOS 89 07/16/2019     BNP: No results found for: BNP  Lipids:   Lab Results   Component Value Date    CHOL 157 07/30/2020    HDL 54 07/30/2020     INR: No results found for: INR  URINE:   Lab Results   Component Value Date    NAUR 73 09/26/2020    PROTUR Negative 11/18/2020     Lab Results   Component Value Date    NITRU Negative 11/18/2020    COLORU Yellow 11/18/2020    COLORU YELLOW 09/25/2020    PHUR 5.5 09/25/2020    WBCUA 0-2 09/25/2020    RBCUA 0-2 09/25/2020    YEAST NONE SEEN 09/25/2020    BACTERIA NONE SEEN 09/25/2020    SPECGRAV 1.006 09/04/2020    LEUKOCYTESUR NEGATIVE 09/25/2020    UROBILINOGEN 0.20 11/18/2020    BILIRUBINUR Negative 11/18/2020    BLOODU Moderate 11/18/2020    BLOODU TRACE 09/25/2020 GLUCOSEU Negative 11/18/2020    KETUA Negative 11/18/2020      Microalbumen/Creatinine ratio:  No components found for: RUCREAT    Objective:   Vitals: BP (!) 140/103 (Site: Left Upper Arm, Position: Sitting, Cuff Size: Large Adult)   Pulse 93   Temp 97.9 °F (36.6 °C)   Wt 268 lb (121.6 kg)   SpO2 97%   BMI 34.41 kg/m²      Constitutional:  Alert, awake, no apparent distress  Skin:normal with no rash or any lesions  HEENT:Pupils are reactive . Throat is clear. Oral mucosa is moist.  Neck:supple with no thyromegaly or bruit   Cardiovascular:  S1, S2 without murmur   Respiratory:  Clear to auscultation with no wheezes or rales  Abdomen: +bowel sound, soft, non tender and no bruit  Ext: No LE edema  Musculoskeletal:Intact  Neuro:Alert, awake and oriented with no obvious focal deficit. Speech is normal.    Electronically signed by Nate Daniel MD on 1/11/2021 at 9:36 AM   **This report has been created using voice recognition software. It maycontain minor  errors which are inherent in voice recognition technology. **

## 2021-02-02 ENCOUNTER — TELEPHONE (OUTPATIENT)
Dept: UROLOGY | Age: 61
End: 2021-02-02

## 2021-02-02 ENCOUNTER — NURSE ONLY (OUTPATIENT)
Dept: LAB | Age: 61
End: 2021-02-02

## 2021-02-02 DIAGNOSIS — R31.9 HEMATURIA, UNSPECIFIED TYPE: Primary | ICD-10-CM

## 2021-02-02 DIAGNOSIS — R31.9 HEMATURIA, UNSPECIFIED TYPE: ICD-10-CM

## 2021-02-02 NOTE — TELEPHONE ENCOUNTER
Patient called I told him he needs to get a urine culture done and he wants to know If he will be put under for the scope? And where is it going to be scheduled? I told him we put an order in for urine culture I would check with Dr Jeff Shaikh and ask him where he wants the scope done. Voice understanding.

## 2021-02-03 NOTE — TELEPHONE ENCOUNTER
Per dr Chanel Holland (perfect serve) he stated pt doesn't need to be seen in OR for cysto can be done here.

## 2021-02-06 LAB
ORGANISM: ABNORMAL
URINE CULTURE, ROUTINE: ABNORMAL

## 2021-02-08 RX ORDER — DOXYCYCLINE HYCLATE 100 MG
100 TABLET ORAL 2 TIMES DAILY
Qty: 14 TABLET | Refills: 0 | Status: SHIPPED | OUTPATIENT
Start: 2021-02-08 | End: 2021-02-15

## 2021-02-08 RX ORDER — SULFAMETHOXAZOLE AND TRIMETHOPRIM 800; 160 MG/1; MG/1
1 TABLET ORAL 2 TIMES DAILY
Qty: 10 TABLET | Refills: 0 | Status: SHIPPED | OUTPATIENT
Start: 2021-02-08 | End: 2021-02-08 | Stop reason: SINTOL

## 2021-02-08 NOTE — TELEPHONE ENCOUNTER
Apolinar Mosqueda wanted us to check with Dr. Becky Huber to make sure doxycycline would be okay to treat UTI. Patient not able to take bactrim.  Please advise

## 2021-02-08 NOTE — TELEPHONE ENCOUNTER
Patient informed to  bactrim and voiced understanding that cysto will be done here in the office. We can give him urojet if he wants.

## 2021-02-08 NOTE — TELEPHONE ENCOUNTER
April, patients kidney specialist didn't like the bactrim. Patient ok with using mychart. Can we do a different antibiotic.

## 2021-02-17 ENCOUNTER — HOSPITAL ENCOUNTER (OUTPATIENT)
Dept: UROLOGY | Age: 61
Discharge: HOME | End: 2021-02-17
Payer: COMMERCIAL

## 2021-02-17 VITALS
BODY MASS INDEX: 35.55 KG/M2 | DIASTOLIC BLOOD PRESSURE: 97 MMHG | HEIGHT: 74 IN | SYSTOLIC BLOOD PRESSURE: 150 MMHG | HEART RATE: 107 BPM | RESPIRATION RATE: 16 BRPM | WEIGHT: 277 LBS | TEMPERATURE: 97.9 F

## 2021-02-17 LAB
BILIRUBIN URINE: NEGATIVE
BLOOD URINE, POC: ABNORMAL
CHARACTER, URINE: ABNORMAL
COLOR, URINE: YELLOW
GLUCOSE URINE: NEGATIVE MG/DL
KETONES, URINE: NEGATIVE
LEUKOCYTE CLUMPS, URINE: ABNORMAL
NITRITE, URINE: NEGATIVE
PH, URINE: 6.5 (ref 5–9)
PROTEIN, URINE: 100 MG/DL
SPECIFIC GRAVITY, URINE: >= 1.03 (ref 1–1.03)
UROBILINOGEN, URINE: 0.2 EU/DL (ref 0–1)

## 2021-02-17 PROCEDURE — 52000 CYSTOURETHROSCOPY: CPT

## 2021-02-17 NOTE — H&P
Dr. Albaro Carlos MD  [facility])  Urology Clinic      Patient:  Yoana Boyer  YOB: 1960  Date: 2/17/2021    HISTORY OF PRESENT ILLNESS:   The patient is a 56 y.o. male who presents today for follow-up for the following problem(s): BPH with outlet obstruction and elevated PSA. Negative biopsy in 2020 and PVP in late 2020 after bioopsy. Having mild UUI but starting to improve. Overall the problem(s) : show no change. Associated Symptoms: No dysuria, gross hematuria. Pain Severity: 0/10    Summary of old records:   Biopsy 2020 - negative  PVP late 2020. Imaging/Labs reviewed during today's visit:  I have independently reviewed and verified the following films during today's visit.   None    Last several PSA's:  Lab Results   Component Value Date    PSA 8.32 (H) 01/04/2021    PSA 13.93 (H) 10/10/2020    PSA 20.73 (H) 10/05/2020       Last total testosterone:  No results found for: TESTOSTERONE    Urinalysis today:  Results for POC orders placed in visit on 11/18/20   POCT Urinalysis No Micro (Auto)   Result Value Ref Range    Glucose, Ur Negative NEGATIVE mg/dl    Bilirubin Urine Negative     Ketones, Urine Negative NEGATIVE    Specific Gravity, Urine 1.020 1.002 - 1.030    Blood, UA POC Moderate (A) NEGATIVE    pH, Urine 7.00 5.0 - 9.0    Protein, Urine Negative NEGATIVE mg/dl    Urobilinogen, Urine 0.20 0.0 - 1.0 eu/dl    Nitrite, Urine Negative NEGATIVE    Leukocyte Clumps, Urine Small (A) NEGATIVE    Color, Urine Yellow YELLOW-STRAW    Character, Urine Clear CLR-SL.CLOUD       Last BUN and creatinine:  Lab Results   Component Value Date    BUN 13 01/04/2021     Lab Results   Component Value Date    CREATININE 1.2 01/04/2021       Imaging Reviewed during this Office Visit:   (results were independently reviewed by physician and radiology report verified)    PAST MEDICAL, FAMILY AND SOCIAL HISTORY UPDATE:  Past Medical History:   Diagnosis Date    Acute kidney injury ([facility] [address] 9/25/2020  Elevated PSA     Hyperlipidemia LDL goal < 100 2/26/2013    Hypertension     Obesity (BMI 30-39. 9)      Past Surgical History:   Procedure Laterality Date    ACHILLES TENDON SURGERY      CORNEAL TRANSPLANT  7/2013    CYSTOSCOPY N/A 10/28/2020    CYSTOSCOPY,GREENLIGHT PHOTOVAPORIZATION OF PROSTATE performed by Nasra Lyons MD at [facility] [address]  2014    \"re-work on corneal trasplant\"    [place] EGD [address] Left 1/18/2018    EGD FOREIGN BODY REMOVAL performed by John Haines MD at [place] [address]  2-4-2014    bx- negative    ULTRASOUND PROSTATE/TRANSRECTAL N/A 10/14/2020    CYSTOSCOY, TRANSRECTAL ULTRASOUND GUIDED PROSTATE BIOPSY performed by Nasra Lyons MD at [address] [place] [address] History   Problem Relation Age of Onset    High Blood Pressure Father     Heart Disease Father     Colon Cancer Neg Hx     Cancer Neg Hx     Stroke Neg Hx      No outpatient medications have been marked as taking for the 2/17/21 encounter [facility] Encounter) with STR URO PROCEDURE ROOM 4. Shellfish-derived products, Lisinopril, and Nsaids  Social History     Tobacco Use   Smoking Status Never Smoker   Smokeless Tobacco Never Used       Social History     Substance and Sexual Activity   Alcohol Use Yes    Alcohol/week: 2.0 standard drinks    Types: 2 Cans of beer per week    Comment: 3 beer per week       REVIEW OF SYSTEMS:  Constitutional: negative  Eyes: negative  Respiratory: negative  Cardiovascular: negative  Gastrointestinal: negative  Musculoskeletal: negative  Genitourinary: negative except for what is in HPI  Skin: negative   Neurological: negative  Hematological/Lymphatic: negative  Psychological: negative    Physical Exam:      Vitals:    02/17/21 1245   BP: (!) 150/97   Pulse: 107   Resp: 16   Temp: 97.9 °F (36.6 °C)     Patient is a 56 y.o. male in no acute distress and alert and oriented to person, place and time.   NAD, A/o  Non labored respiration  Normal peripheral pulses  Skin- warm and dry  Psych- normal mood and affect      Assessment and Plan      Hematuria, gross. Plan:      No follow-ups on file.   Cystoscopy         Dr. Meri Segal MD

## 2021-02-17 NOTE — PROGRESS NOTES
Patient arrived in [facility]rology [facility] for cystoscopy   This procedure has been fully reviewed with the patient and written informed consent has been obtained. 134 Procedure started with   136 Procedure completed; patient tolerated well. Dr. Acacia Riggins talked to patient in length about procedure findings. *Patient discharged    PLAN: restart finasteride and follow up in the office in 6 months.

## 2021-02-22 RX ORDER — FINASTERIDE 5 MG/1
5 TABLET, FILM COATED ORAL DAILY
Qty: 90 TABLET | Refills: 3 | Status: SHIPPED | OUTPATIENT
Start: 2021-02-22 | End: 2022-03-18

## 2021-02-22 NOTE — OP NOTE
Dr. Jose John MD  Urologic Surgery        [address] [place] [address]. [place]  2/17/2021    Patient:  Theron Peterson  MRN: [id number]  YOB: 1960    Surgeon: Dr. Jose John MD  Assistant: None    Pre-op Diagnosis: Gross hematuria  Post-op Diagnosis: Same    Procedure:   Cystoscopy. Anesthesia: Local  Complications: None  OR Blood Loss: Minimal  Fluids: Cystalloids  Specimens: None    Indication:  Patient is a 68-year-old male with history of gross hematuria. He presents today for cystoscopy. Narrative of the Procedure:    After informed consent was obtained in the preoperative area, the patient was taken back to the operating room and remained on the hospital gurViking. The patient was prepped and draped in a sterile manner. A time out occurred in which two patient identifiers were used. The flexible scope was carefully placed into the bladder. Findings:  Urethra: Normal.  No stricture or stenosis. Prostate: TURP defect present. Small amount of frondular tissue on the anterior prostate consistent with regrowth. Dilated varices on this tissue. Bladder Neck: Normal.  Papillary lesions: None. Trabeculations: Mild. Cellules/Diverticula: None. Bladder Stones: None. Ureteral Orifices: Normal position with clear reflux bilaterally. OVERALL IMPRESSION: Mild regrowth of apical prostate tissue causing hematuria.       Follow-Up: We will start patient on finasteride 5 mg daily to reduce prostate blood flow and hematuria    Jose John  Electronically signed on 2/22/2021 at 11:01 AM

## 2021-03-25 RX ORDER — AMLODIPINE BESYLATE 5 MG/1
TABLET ORAL
Qty: 90 TABLET | Refills: 0 | Status: SHIPPED | OUTPATIENT
Start: 2021-03-25 | End: 2021-06-22

## 2021-04-23 DIAGNOSIS — R97.20 ELEVATED PSA: Primary | ICD-10-CM

## 2021-04-26 ENCOUNTER — NURSE ONLY (OUTPATIENT)
Dept: LAB | Age: 61
End: 2021-04-26

## 2021-04-26 DIAGNOSIS — R97.20 ELEVATED PSA: ICD-10-CM

## 2021-04-26 LAB — PROSTATE SPECIFIC ANTIGEN: 5.62 NG/ML (ref 0–1)

## 2021-05-05 ENCOUNTER — OFFICE VISIT (OUTPATIENT)
Dept: UROLOGY | Age: 61
End: 2021-05-05
Payer: COMMERCIAL

## 2021-05-05 VITALS
BODY MASS INDEX: 35.68 KG/M2 | HEIGHT: 74 IN | SYSTOLIC BLOOD PRESSURE: 116 MMHG | WEIGHT: 278 LBS | DIASTOLIC BLOOD PRESSURE: 82 MMHG

## 2021-05-05 DIAGNOSIS — R31.9 HEMATURIA, UNSPECIFIED TYPE: ICD-10-CM

## 2021-05-05 DIAGNOSIS — N40.1 BENIGN PROSTATIC HYPERPLASIA WITH LOWER URINARY TRACT SYMPTOMS, SYMPTOM DETAILS UNSPECIFIED: ICD-10-CM

## 2021-05-05 DIAGNOSIS — R97.20 ELEVATED PSA: Primary | ICD-10-CM

## 2021-05-05 LAB
BILIRUBIN URINE: NEGATIVE
BLOOD URINE, POC: ABNORMAL
CHARACTER, URINE: CLEAR
COLOR, URINE: YELLOW
GLUCOSE URINE: NEGATIVE MG/DL
KETONES, URINE: NEGATIVE
LEUKOCYTE CLUMPS, URINE: ABNORMAL
NITRITE, URINE: NEGATIVE
PH, URINE: 7 (ref 5–9)
PROTEIN, URINE: NEGATIVE MG/DL
SPECIFIC GRAVITY, URINE: >= 1.03 (ref 1–1.03)
UROBILINOGEN, URINE: 0.2 EU/DL (ref 0–1)

## 2021-05-05 PROCEDURE — 99214 OFFICE O/P EST MOD 30 MIN: CPT | Performed by: UROLOGY

## 2021-05-05 PROCEDURE — 81003 URINALYSIS AUTO W/O SCOPE: CPT | Performed by: UROLOGY

## 2021-05-05 PROCEDURE — G8427 DOCREV CUR MEDS BY ELIG CLIN: HCPCS | Performed by: UROLOGY

## 2021-05-05 PROCEDURE — G8417 CALC BMI ABV UP PARAM F/U: HCPCS | Performed by: UROLOGY

## 2021-05-05 PROCEDURE — 3017F COLORECTAL CA SCREEN DOC REV: CPT | Performed by: UROLOGY

## 2021-05-05 PROCEDURE — 1036F TOBACCO NON-USER: CPT | Performed by: UROLOGY

## 2021-05-05 NOTE — PROGRESS NOTES
and radiology report verified)    PAST MEDICAL, FAMILY AND SOCIAL HISTORY UPDATE:  Past Medical History:   Diagnosis Date    Acute kidney injury ([address] 9/25/2020    Elevated PSA     Hyperlipidemia LDL goal < 100 2/26/2013    Hypertension     Obesity (BMI 30-39. 9)      Past Surgical History:   Procedure Laterality Date    ACHILLES TENDON SURGERY      CORNEAL TRANSPLANT  7/2013    CYSTOSCOPY N/A 10/28/2020    CYSTOSCOPY,GREENLIGHT PHOTOVAPORIZATION OF PROSTATE performed by Megan Rhodes MD at [address] [facility]  2014    \"re-work on corneal trasplant\"    [place] EGD [address] Left 1/18/2018    EGD FOREIGN BODY REMOVAL performed by Shruthi Cespedes MD at [place] [address]  2-4-2014    bx- negative    ULTRASOUND PROSTATE/TRANSRECTAL N/A 10/14/2020    CYSTOSCOY, TRANSRECTAL ULTRASOUND GUIDED PROSTATE BIOPSY performed by Megan Rhodes MD at [facility] History   Problem Relation Age of Onset    High Blood Pressure Father     Heart Disease Father     Colon Cancer Neg Hx     Cancer Neg Hx     Stroke Neg Hx      Outpatient Medications Marked as Taking for the 5/5/21 encounter (Office Visit) with Megan Rhodes MD   Medication Sig Dispense Refill    amLODIPine (NORVASC) 5 MG tablet TAKE 1 TABLET BY MOUTH ONE TIME A DAY  90 tablet 0    finasteride (PROSCAR) 5 MG tablet Take 1 tablet by mouth daily 90 tablet 3    atorvastatin (LIPITOR) 20 MG tablet TAKE 1 TABLET BY MOUTH ONE TIME A DAY 90 tablet 3    aspirin 81 MG tablet Take 81 mg by mouth daily      MULTIPLE VITAMIN PO Take by mouth daily          Shellfish-derived products, Lisinopril, and Nsaids  Social History     Tobacco Use   Smoking Status Never Smoker   Smokeless Tobacco Never Used       Social History     Substance and Sexual Activity   Alcohol Use Yes    Alcohol/week: 2.0 standard drinks    Types: 2 Cans of beer per week    Comment: 3 beer per week       REVIEW OF SYSTEMS:  Constitutional: negative  Eyes:

## 2021-06-22 RX ORDER — AMLODIPINE BESYLATE 5 MG/1
TABLET ORAL
Qty: 90 TABLET | Refills: 0 | Status: SHIPPED | OUTPATIENT
Start: 2021-06-22 | End: 2021-07-15 | Stop reason: SDUPTHER

## 2021-07-02 ENCOUNTER — PATIENT MESSAGE (OUTPATIENT)
Dept: FAMILY MEDICINE CLINIC | Age: 61
End: 2021-07-02

## 2021-07-02 DIAGNOSIS — E78.5 HYPERLIPIDEMIA WITH TARGET LDL LESS THAN 100: Primary | ICD-10-CM

## 2021-07-02 DIAGNOSIS — R73.01 IFG (IMPAIRED FASTING GLUCOSE): ICD-10-CM

## 2021-07-06 NOTE — TELEPHONE ENCOUNTER
From: Dami Rosario  To: Jose Meza DO  Sent: 7/2/2021 8:53 PM EDT  Subject: Non-Urgent Medical Question    I have a yearly check up coming in 2 weeks what test I need before the visit ?

## 2021-07-07 ENCOUNTER — NURSE ONLY (OUTPATIENT)
Dept: LAB | Age: 61
End: 2021-07-07

## 2021-07-07 DIAGNOSIS — R97.20 ELEVATED PSA: ICD-10-CM

## 2021-07-07 DIAGNOSIS — E78.5 HYPERLIPIDEMIA WITH TARGET LDL LESS THAN 100: ICD-10-CM

## 2021-07-07 DIAGNOSIS — R73.01 IFG (IMPAIRED FASTING GLUCOSE): ICD-10-CM

## 2021-07-07 LAB
ALBUMIN SERPL-MCNC: 4.4 G/DL (ref 3.5–5.1)
ALP BLD-CCNC: 88 U/L (ref 38–126)
ALT SERPL-CCNC: 21 U/L (ref 11–66)
AST SERPL-CCNC: 22 U/L (ref 5–40)
AVERAGE GLUCOSE: 126 MG/DL (ref 70–126)
BILIRUB SERPL-MCNC: 0.6 MG/DL (ref 0.3–1.2)
BILIRUBIN DIRECT: < 0.2 MG/DL (ref 0–0.3)
CHOLESTEROL, TOTAL: 161 MG/DL (ref 100–199)
HBA1C MFR BLD: 6.2 % (ref 4.4–6.4)
HDLC SERPL-MCNC: 54 MG/DL
LDL CHOLESTEROL CALCULATED: 81 MG/DL
PROSTATE SPECIFIC ANTIGEN: 6.04 NG/ML (ref 0–1)
TOTAL PROTEIN: 6.9 G/DL (ref 6.1–8)
TRIGL SERPL-MCNC: 129 MG/DL (ref 0–199)
TSH SERPL DL<=0.05 MIU/L-ACNC: 0.76 UIU/ML (ref 0.4–4.2)

## 2021-07-15 ENCOUNTER — OFFICE VISIT (OUTPATIENT)
Dept: FAMILY MEDICINE CLINIC | Age: 61
End: 2021-07-15
Payer: COMMERCIAL

## 2021-07-15 VITALS
BODY MASS INDEX: 34.12 KG/M2 | HEART RATE: 68 BPM | SYSTOLIC BLOOD PRESSURE: 126 MMHG | HEIGHT: 74 IN | WEIGHT: 265.9 LBS | DIASTOLIC BLOOD PRESSURE: 80 MMHG

## 2021-07-15 DIAGNOSIS — N13.8 BENIGN PROSTATIC HYPERPLASIA WITH URINARY OBSTRUCTION: ICD-10-CM

## 2021-07-15 DIAGNOSIS — R73.01 IFG (IMPAIRED FASTING GLUCOSE): ICD-10-CM

## 2021-07-15 DIAGNOSIS — E66.9 OBESITY (BMI 35.0-39.9 WITHOUT COMORBIDITY): ICD-10-CM

## 2021-07-15 DIAGNOSIS — N40.1 BENIGN PROSTATIC HYPERPLASIA WITH URINARY OBSTRUCTION: ICD-10-CM

## 2021-07-15 DIAGNOSIS — E78.5 HYPERLIPIDEMIA WITH TARGET LDL LESS THAN 100: ICD-10-CM

## 2021-07-15 DIAGNOSIS — I10 ESSENTIAL HYPERTENSION: Primary | ICD-10-CM

## 2021-07-15 PROCEDURE — 3017F COLORECTAL CA SCREEN DOC REV: CPT | Performed by: FAMILY MEDICINE

## 2021-07-15 PROCEDURE — 1036F TOBACCO NON-USER: CPT | Performed by: FAMILY MEDICINE

## 2021-07-15 PROCEDURE — G8427 DOCREV CUR MEDS BY ELIG CLIN: HCPCS | Performed by: FAMILY MEDICINE

## 2021-07-15 PROCEDURE — 99214 OFFICE O/P EST MOD 30 MIN: CPT | Performed by: FAMILY MEDICINE

## 2021-07-15 PROCEDURE — G8417 CALC BMI ABV UP PARAM F/U: HCPCS | Performed by: FAMILY MEDICINE

## 2021-07-15 RX ORDER — ATORVASTATIN CALCIUM 20 MG/1
TABLET, FILM COATED ORAL
Qty: 90 TABLET | Refills: 3 | Status: SHIPPED | OUTPATIENT
Start: 2021-07-15 | End: 2021-12-17 | Stop reason: SDUPTHER

## 2021-07-15 RX ORDER — AMLODIPINE BESYLATE 5 MG/1
TABLET ORAL
Qty: 90 TABLET | Refills: 3 | Status: SHIPPED | OUTPATIENT
Start: 2021-07-15 | End: 2022-09-06

## 2021-07-15 SDOH — ECONOMIC STABILITY: FOOD INSECURITY: WITHIN THE PAST 12 MONTHS, YOU WORRIED THAT YOUR FOOD WOULD RUN OUT BEFORE YOU GOT MONEY TO BUY MORE.: NEVER TRUE

## 2021-07-15 SDOH — ECONOMIC STABILITY: FOOD INSECURITY: WITHIN THE PAST 12 MONTHS, THE FOOD YOU BOUGHT JUST DIDN'T LAST AND YOU DIDN'T HAVE MONEY TO GET MORE.: NEVER TRUE

## 2021-07-15 ASSESSMENT — PATIENT HEALTH QUESTIONNAIRE - PHQ9
SUM OF ALL RESPONSES TO PHQ QUESTIONS 1-9: 0
SUM OF ALL RESPONSES TO PHQ9 QUESTIONS 1 & 2: 0
SUM OF ALL RESPONSES TO PHQ QUESTIONS 1-9: 0
2. FEELING DOWN, DEPRESSED OR HOPELESS: 0
SUM OF ALL RESPONSES TO PHQ QUESTIONS 1-9: 0
1. LITTLE INTEREST OR PLEASURE IN DOING THINGS: 0

## 2021-07-15 ASSESSMENT — SOCIAL DETERMINANTS OF HEALTH (SDOH): HOW HARD IS IT FOR YOU TO PAY FOR THE VERY BASICS LIKE FOOD, HOUSING, MEDICAL CARE, AND HEATING?: NOT HARD AT ALL

## 2021-07-15 ASSESSMENT — ENCOUNTER SYMPTOMS
RESPIRATORY NEGATIVE: 1
GASTROINTESTINAL NEGATIVE: 1

## 2021-07-15 NOTE — PROGRESS NOTES
7/15/2021    Kuldeep Mitchell (:  1960) is a 56 y.o. male, here for a preventive medicine evaluation. Chief Complaint   Patient presents with    Annual Exam    Results     Annual eval.      Doing well, recently retired. BP looks great. BP Readings from Last 3 Encounters:   07/15/21 126/80   21 116/82   21 (!) 150/97     Wt Readings from Last 3 Encounters:   07/15/21 265 lb 14.4 oz (120.6 kg)   21 278 lb (126.1 kg)   21 277 lb (125.6 kg)     Continues to follow with Urology, PSA stable. Lab Results   Component Value Date    PSA 6.04 (H) 2021    PSA 5.62 (H) 2021    PSA 8.32 (H) 2021         Patient Active Problem List   Diagnosis    HTN (hypertension)    IFG (impaired fasting glucose)    Hyperlipidemia with target LDL less than 100    Elevated PSA    Chronic prostatitis    Benign prostatic hyperplasia with lower urinary tract symptoms    SCOTTIE (acute [address]) [address] [address]    Urinary retention    Enlarged prostate    CKD (chronic kidney disease), stage II    Normocytic anemia    Obesity (BMI 30-39. 9)       Review of Systems   Constitutional: Negative. HENT: Negative. Respiratory: Negative. Cardiovascular: Negative. Gastrointestinal: Negative. Musculoskeletal: Negative. All other systems reviewed and are negative. Prior to Visit Medications    Medication Sig Taking?  Authorizing Provider   amLODIPine (NORVASC) 5 MG tablet TAKE 1 TABLET BY MOUTH EVERY DAY Yes Farnaz Paul DO   atorvastatin (LIPITOR) 20 MG tablet TAKE 1 TABLET BY MOUTH ONE TIME A DAY Yes Farnaz Paul DO   finasteride (PROSCAR) 5 MG tablet Take 1 tablet by mouth daily Yes Misael Alberts MD   aspirin 81 MG tablet Take 81 mg by mouth daily Yes Historical Provider, MD   MULTIPLE VITAMIN PO Take by mouth daily  Yes Historical Provider, MD        Allergies   Allergen Reactions    Shellfish-Derived Products      Chest tightens and throat swells    Lisinopril Swelling    Nsaids      Due to acute kidney injury 9/20       Past Medical History:   Diagnosis Date    Acute kidney injury ([address] 9/25/2020    Elevated PSA     Hyperlipidemia LDL goal < 100 2/26/2013    Hypertension     Obesity (BMI 30-39. 9)        Past Surgical History:   Procedure Laterality Date    ACHILLES TENDON SURGERY      CORNEAL TRANSPLANT  7/2013    CYSTOSCOPY N/A 10/28/2020    CYSTOSCOPY,GREENLIGHT PHOTOVAPORIZATION OF PROSTATE performed by David Her MD at [address] [place] [address]  2014    \"re-work on corneal trasplant\"    [place] EGD FLEXIBLE FOREIGN BODY REMOVAL Left 1/18/2018    EGD FOREIGN BODY REMOVAL performed by Melissa Krishna MD at [place] [address]  2-4-2014    bx- negative    ULTRASOUND PROSTATE/TRANSRECTAL N/A 10/14/2020    CYSTOSCOY, TRANSRECTAL ULTRASOUND GUIDED PROSTATE BIOPSY performed by David Her MD at [facility] History     Socioeconomic History    Marital status:      Spouse name: Not on file    Number of children: Not on file    Years of education: Not on file    Highest education level: Not on file   Occupational History    Not on file   Tobacco Use    Smoking status: Never Smoker    Smokeless tobacco: Never Used   Vaping Use    Vaping Use: Former   Substance and Sexual Activity    Alcohol use: Yes     Alcohol/week: 2.0 standard drinks     Types: 2 Cans of beer per week     Comment: 3 beer per week    Drug use: No    Sexual activity: Not on file   Other Topics Concern    Not on file   Social History Narrative    Not on file     Social Determinants of Health     Financial Resource Strain: Low Risk     Difficulty of Paying Living Expenses: Not hard at all   Food Insecurity: No Food Insecurity    Worried About [address] in the Last Year: Never true    [facility] in the Last Year: Never true   Transportation Needs:     Lack of Transportation (Medical):      Lack of Transportation (Non-Medical): Physical Activity:     Days of Exercise per Week:     Minutes of Exercise per Session:    Stress:     Feeling of Stress :    Social Connections:     Frequency of Communication with Friends and Family:     Frequency of Social Gatherings with Friends and Family:     Attends [employer] Services:     Active Member of Clubs or Organizations:     Attends Club or Organization Meetings:     Marital Status:    Intimate Partner Violence:     Fear of Current or Ex-Partner:     Emotionally Abused:     Physically Abused:     Sexually Abused:         Family History   Problem Relation Age of Onset    High Blood Pressure Father     Heart Disease Father     Colon Cancer Neg Hx     Cancer Neg Hx     Stroke Neg Hx        ADVANCE DIRECTIVE: N, <no information>    Vitals:    07/15/21 1356   BP: 126/80   Site: Left Upper Arm   Position: Sitting   Cuff Size: Large Adult   Pulse: 68   Weight: 265 lb 14.4 oz (120.6 kg)   Height: 6' 2\" (1.88 m)     Estimated body mass index is 34.14 kg/m² as calculated from the following:    Height as of this encounter: 6' 2\" (1.88 m). Weight as of this encounter: 265 lb 14.4 oz (120.6 kg). Physical Exam  Vitals and nursing note reviewed. Constitutional:       General: He is not in acute distress. Appearance: Normal appearance. He is well-developed. HENT:      Head: Normocephalic and atraumatic. Right Ear: Tympanic membrane normal.      Left Ear: Tympanic membrane normal.   Eyes:      Conjunctiva/sclera: Conjunctivae normal.   Cardiovascular:      Rate and Rhythm: Normal rate and regular rhythm. Heart sounds: Normal heart sounds. No murmur heard. Pulmonary:      Effort: Pulmonary effort is normal.      Breath sounds: Normal breath sounds. No wheezing, rhonchi or rales. Abdominal:      General: There is no distension. Musculoskeletal:      Cervical back: Neck supple. Skin:     General: Skin is warm and dry. Findings: No rash (on exposed surfaces). Neurological:      General: No focal deficit present. Mental Status: He is alert. Psychiatric:         Attention and Perception: Attention normal.         Mood and Affect: Mood normal.         Speech: Speech normal.         Behavior: Behavior normal. Behavior is cooperative. Thought Content: Thought content normal.         Judgment: Judgment normal.         No flowsheet data found.     Lab Results   Component Value Date    CHOL 161 07/07/2021    CHOL 157 07/30/2020    CHOL 162 07/16/2019    TRIG 129 07/07/2021    TRIG 98 07/30/2020    TRIG 78 07/16/2019    HDL 54 07/07/2021    HDL 54 07/30/2020    HDL 85 07/16/2019    LDLCALC 81 07/07/2021    LDLCALC 83 07/30/2020    LDLCALC 61 07/16/2019    GLUCOSE 122 01/04/2021    LABA1C 6.2 07/07/2021    LABA1C 6.1 09/26/2020    LABA1C 6.3 07/30/2020       The 10-year ASCVD risk score (John Srivastava, et al., 2013) is: 12.9%    Values used to calculate the score:      Age: 56 years      Sex: Male      Is Non- : Yes      Diabetic: No      Tobacco smoker: No      Systolic Blood Pressure: 736 mmHg      Is BP treated: Yes      HDL Cholesterol: 54 mg/dL      Total Cholesterol: 161 mg/dL    Immunization History   Administered Date(s) Administered    Influenza Vaccine, unspecified formulation 11/01/2016    Influenza Virus Vaccine 11/01/2014, 10/22/2015, 10/06/2019, 08/06/2020    Influenza, Triv, 3 Years and older, IM (Afluria (5 yrs and older) 09/15/2017    Tdap (Boostrix, Adacel) 04/09/2015       Health Maintenance   Topic Date Due    Hepatitis C screen  Never done    HIV screen  Never done    Shingles Vaccine (1 of 2) Never done    Flu vaccine (1) 09/01/2021    A1C test (Diabetic or Prediabetic)  07/07/2022    Lipid screen  07/07/2022    PSA counseling  07/07/2022    DTaP/Tdap/Td vaccine (2 - Td or Tdap) 04/09/2025    Colon cancer screen colonoscopy  09/29/2027    COVID-19 Vaccine  Completed    Hepatitis A vaccine  Aged Jeevan Palacios Hepatitis B vaccine  Aged Out    Hib vaccine  Aged Out    Meningococcal (ACWY) vaccine  Aged Out    Pneumococcal 0-64 years Vaccine  Aged Out          ASSESSMENT/PLAN:  1. Essential hypertension  2. Hyperlipidemia with target LDL less than 100  3. IFG (impaired fasting glucose)  4. Benign prostatic hyperplasia with urinary obstruction  5. Obesity (BMI 35.0-39.9 without comorbidity)    -  Chronic medical problems stable  -  Labs reviewed, look fine  -  Continue current medications  -  Follow up with specialists as scheduled      Return in about 1 year (around 7/15/2022) for HTN. An electronic signature was used to authenticate this note.     --Zahida Saez DO on 7/15/2021 at 2:12 PM

## 2021-07-15 NOTE — PROGRESS NOTES
Chronic Disease Visit Information    BP Readings from Last 3 Encounters:   07/15/21 126/80   05/05/21 116/82   02/17/21 (!) 150/97          Hemoglobin A1C (%)   Date Value   07/07/2021 6.2   09/26/2020 6.1   07/30/2020 6.3     LDL Calculated (mg/dL)   Date Value   07/07/2021 81     HDL (mg/dL)   Date Value   07/07/2021 54     BUN (mg/dL)   Date Value   01/04/2021 13     CREATININE (mg/dL)   Date Value   01/04/2021 1.2     Glucose (mg/dL)   Date Value   01/04/2021 122 (H)            Have you changed or started any medications since your last visit including any over-the-counter medicines, vitamins, or herbal medicines? no   Are you having any side effects from any of your medications? -  no  Have you stopped taking any of your medications? Is so, why? -  no    Have you seen any other physician or provider since your last visit? Yes - Records Obtained  Have you had any other diagnostic tests since your last visit? Yes - Records Obtained  Have you been seen in the emergency room and/or had an admission to a hospital since we last saw you? No  Have you had your annual diabetic retinal (eye) exam? No  Have you had your routine dental cleaning in the past 6 months? yes - 5/2021    Have you activated your [employer] account? If not, what are your barriers?  Yes     Patient Care Team:  Connor Weiss DO as PCP - General (Family Medicine)  Connor Weiss DO as PCP - [facility]  Carlita Porter MD as Consulting Physician (Urology)         Medical History Review  Past Medical, Family, and Social History reviewed and does contribute to the patient presenting condition    Health Maintenance   Topic Date Due    Hepatitis C screen  Never done    HIV screen  Never done    Shingles Vaccine (1 of 2) Never done    Flu vaccine (1) 09/01/2021    A1C test (Diabetic or Prediabetic)  07/07/2022    Lipid screen  07/07/2022    PSA counseling  07/07/2022    DTaP/Tdap/Td vaccine (2 - Td or Tdap) 04/09/2025    Colon cancer screen colonoscopy  09/29/2027    COVID-19 Vaccine  Completed    Hepatitis A vaccine  Aged Out    Hepatitis B vaccine  Aged Out    Hib vaccine  Aged Out    Meningococcal (ACWY) vaccine  Aged Out    Pneumococcal 0-64 years Vaccine  Aged Out

## 2021-07-19 ENCOUNTER — NURSE ONLY (OUTPATIENT)
Dept: LAB | Age: 61
End: 2021-07-19

## 2021-07-19 ENCOUNTER — OFFICE VISIT (OUTPATIENT)
Dept: NEPHROLOGY | Age: 61
End: 2021-07-19
Payer: COMMERCIAL

## 2021-07-19 VITALS
SYSTOLIC BLOOD PRESSURE: 144 MMHG | WEIGHT: 264.6 LBS | BODY MASS INDEX: 33.97 KG/M2 | HEART RATE: 102 BPM | DIASTOLIC BLOOD PRESSURE: 93 MMHG | TEMPERATURE: 97.9 F | OXYGEN SATURATION: 97 %

## 2021-07-19 DIAGNOSIS — N17.9 AKI (ACUTE KIDNEY INJURY) (HCC): Primary | ICD-10-CM

## 2021-07-19 DIAGNOSIS — E78.5 DYSLIPIDEMIA: ICD-10-CM

## 2021-07-19 DIAGNOSIS — N17.9 AKI (ACUTE KIDNEY INJURY) (HCC): ICD-10-CM

## 2021-07-19 DIAGNOSIS — I10 ESSENTIAL HYPERTENSION: ICD-10-CM

## 2021-07-19 LAB
ANION GAP SERPL CALCULATED.3IONS-SCNC: 11 MEQ/L (ref 8–16)
BUN BLDV-MCNC: 14 MG/DL (ref 7–22)
CALCIUM SERPL-MCNC: 9.1 MG/DL (ref 8.5–10.5)
CHLORIDE BLD-SCNC: 108 MEQ/L (ref 98–111)
CO2: 22 MEQ/L (ref 23–33)
CREAT SERPL-MCNC: 1.2 MG/DL (ref 0.4–1.2)
GFR SERPL CREATININE-BSD FRML MDRD: 74 ML/MIN/1.73M2
GLUCOSE BLD-MCNC: 110 MG/DL (ref 70–108)
POTASSIUM SERPL-SCNC: 3.7 MEQ/L (ref 3.5–5.2)
SODIUM BLD-SCNC: 141 MEQ/L (ref 135–145)

## 2021-07-19 PROCEDURE — 99213 OFFICE O/P EST LOW 20 MIN: CPT | Performed by: INTERNAL MEDICINE

## 2021-07-19 PROCEDURE — 1036F TOBACCO NON-USER: CPT | Performed by: INTERNAL MEDICINE

## 2021-07-19 PROCEDURE — G8417 CALC BMI ABV UP PARAM F/U: HCPCS | Performed by: INTERNAL MEDICINE

## 2021-07-19 PROCEDURE — 3017F COLORECTAL CA SCREEN DOC REV: CPT | Performed by: INTERNAL MEDICINE

## 2021-07-19 PROCEDURE — G8427 DOCREV CUR MEDS BY ELIG CLIN: HCPCS | Performed by: INTERNAL MEDICINE

## 2021-07-19 NOTE — PROGRESS NOTES
Renal Progress Note    Assessment and Plan:      Diagnosis Orders   1. SCOTTIE (acute kidney injury) ([address]     2. Essential hypertension     3. Dyslipidemia       PLAN:  I discussed my thoughts with the patient today. He understood. I addressed his questions. No labs today. Will have a BMP done today. We will call him with result when available. Tentative appointment for 12 months. If serum creatinine is very good however, will cancel the appointment. This was discussed with the patient. Patient Active Problem List   Diagnosis    HTN (hypertension)    IFG (impaired fasting glucose)    Hyperlipidemia with target LDL less than 100    Elevated PSA    Chronic prostatitis    Benign prostatic hyperplasia with lower urinary tract symptoms    SCOTTIE (acute kidney injury) ([address]    Urinary retention    Enlarged prostate    CKD (chronic kidney disease), stage II    Normocytic anemia    Obesity (BMI 30-39. 9)         Subjective:   Chief complaint:  Chief Complaint   Patient presents with    Chronic Kidney Disease     Stage II      HPI:This is a follow up visit for  Mr George Byrd who is here today for return appointment. I see him for chronic kidney disease. Also acute kidney injury. He was last seen about 6 months ago. Doing well since then. Home blood pressure is good. Systolic is in the 999Z. Unfortunately  BMP was not done for this appointment. Denies chest pain,shortness of breath ,fever,chills    ROS:  Pertinent positives stated above in HPI. All other systems were reviewed and were negative.   Medications:     Current Outpatient Medications   Medication Sig Dispense Refill    amLODIPine (NORVASC) 5 MG tablet TAKE 1 TABLET BY MOUTH EVERY DAY 90 tablet 3    atorvastatin (LIPITOR) 20 MG tablet TAKE 1 TABLET BY MOUTH ONE TIME A DAY 90 tablet 3    finasteride (PROSCAR) 5 MG tablet Take 1 tablet by mouth daily 90 tablet 3    aspirin 81 MG tablet Take 81 mg by mouth daily      MULTIPLE VITAMIN PO Take by mouth daily        No current facility-administered medications for this visit.        Lab Results:    CBC:   Lab Results   Component Value Date    WBC 8.5 10/03/2020    HGB 11.7 (L) 10/03/2020    HCT 36.0 (L) 10/03/2020    MCV 95.0 (H) 10/03/2020     10/03/2020     BMP:    Lab Results   Component Value Date     01/04/2021     10/10/2020     10/05/2020    K 3.7 01/04/2021    K 3.8 10/10/2020    K 3.8 10/05/2020     01/04/2021     10/10/2020     10/05/2020    CO2 21 (L) 01/04/2021    CO2 25 10/10/2020    CO2 22 (L) 10/05/2020    BUN 13 01/04/2021    BUN 12 10/10/2020    BUN 17 10/05/2020    CREATININE 1.2 01/04/2021    CREATININE 1.3 (H) 10/10/2020    CREATININE 1.4 (H) 10/05/2020    GLUCOSE 122 (H) 01/04/2021    GLUCOSE 98 10/10/2020    GLUCOSE 150 (H) 10/05/2020      Hepatic:   Lab Results   Component Value Date    AST 22 07/07/2021    AST 30 09/25/2020    AST 25 07/30/2020    ALT 21 07/07/2021    ALT 51 09/25/2020    ALT 32 07/30/2020    BILITOT 0.6 07/07/2021    BILITOT 0.4 09/25/2020    BILITOT 0.5 07/30/2020    ALKPHOS 88 07/07/2021    ALKPHOS 88 09/25/2020    ALKPHOS 105 07/30/2020     BNP: No results found for: BNP  Lipids:   Lab Results   Component Value Date    CHOL 161 07/07/2021    HDL 54 07/07/2021     INR: No results found for: INR  URINE:   Lab Results   Component Value Date    NAUR 73 09/26/2020    PROTUR Negative 05/05/2021     Lab Results   Component Value Date    NITRU Negative 05/05/2021    COLORU Yellow 05/05/2021    COLORU YELLOW 09/25/2020    PHUR 5.5 09/25/2020    WBCUA 0-2 09/25/2020    RBCUA 0-2 09/25/2020    YEAST NONE SEEN 09/25/2020    BACTERIA NONE SEEN 09/25/2020    SPECGRAV 1.006 09/04/2020    LEUKOCYTESUR NEGATIVE 09/25/2020    UROBILINOGEN 0.20 05/05/2021    BILIRUBINUR Negative 05/05/2021    BLOODU Trace-lysed 05/05/2021    BLOODU TRACE 09/25/2020    GLUCOSEU Negative 05/05/2021    KETUA Negative 05/05/2021 Microalbumen/Creatinine ratio:  No components found for: RUCREAT    Objective:   Vitals: BP (!) 144/93 (Site: Right Upper Arm, Position: Sitting, Cuff Size: Large Adult)   Pulse 102   Temp 97.9 °F (36.6 °C)   Wt 264 lb 9.6 oz (120 kg)   SpO2 97%   BMI 33.97 kg/m²      Constitutional:  Alert, awake, no apparent distress  Skin:normal with no rash or any lesions  HEENT:Pupils are reactive . Throat is clear. Oral mucosa is moist.  Neck:supple with no thyromegaly or bruit   Cardiovascular:  S1, S2 without murmur   Respiratory:  Clear to auscultation with no wheezes or rales  Abdomen: +bowel sound, soft, non tender and no bruit  Ext: No LE edema  Musculoskeletal:Intact  Neuro:Alert, awake and oriented with no obvious focal deficit. Speech is normal.    Electronically signed by Shaun Dominguez MD on 7/19/2021 at 9:55 AM   **This report has been created using voice recognition software. It maycontain minor  errors which are inherent in voice recognition technology. **

## 2021-07-20 ENCOUNTER — TELEPHONE (OUTPATIENT)
Dept: NEPHROLOGY | Age: 61
End: 2021-07-20

## 2021-07-20 NOTE — TELEPHONE ENCOUNTER
----- Message from Nimisha Vitale MD sent at 7/20/2021  5:28 AM EDT -----  Serum creatinine is still good  Serum bicarbonate slightly low   Keep one year appointment

## 2021-11-22 ENCOUNTER — E-VISIT (OUTPATIENT)
Dept: OTHER | Facility: CLINIC | Age: 61
End: 2021-11-22
Payer: COMMERCIAL

## 2021-11-22 DIAGNOSIS — G47.00 INSOMNIA, UNSPECIFIED TYPE: Primary | ICD-10-CM

## 2021-11-22 PROCEDURE — 99421 OL DIG E/M SVC 5-10 MIN: CPT | Performed by: FAMILY MEDICINE

## 2021-12-15 ENCOUNTER — OFFICE VISIT (OUTPATIENT)
Dept: FAMILY MEDICINE CLINIC | Age: 61
End: 2021-12-15
Payer: COMMERCIAL

## 2021-12-15 VITALS
DIASTOLIC BLOOD PRESSURE: 86 MMHG | RESPIRATION RATE: 18 BRPM | BODY MASS INDEX: 33.28 KG/M2 | HEART RATE: 76 BPM | SYSTOLIC BLOOD PRESSURE: 124 MMHG | WEIGHT: 259.2 LBS

## 2021-12-15 DIAGNOSIS — R06.83 SNORING: Primary | ICD-10-CM

## 2021-12-15 PROCEDURE — 99213 OFFICE O/P EST LOW 20 MIN: CPT | Performed by: FAMILY MEDICINE

## 2021-12-15 PROCEDURE — G8417 CALC BMI ABV UP PARAM F/U: HCPCS | Performed by: FAMILY MEDICINE

## 2021-12-15 PROCEDURE — 1036F TOBACCO NON-USER: CPT | Performed by: FAMILY MEDICINE

## 2021-12-15 PROCEDURE — 3017F COLORECTAL CA SCREEN DOC REV: CPT | Performed by: FAMILY MEDICINE

## 2021-12-15 PROCEDURE — G8484 FLU IMMUNIZE NO ADMIN: HCPCS | Performed by: FAMILY MEDICINE

## 2021-12-15 PROCEDURE — G8427 DOCREV CUR MEDS BY ELIG CLIN: HCPCS | Performed by: FAMILY MEDICINE

## 2021-12-15 RX ORDER — BRIMONIDINE TARTRATE/TIMOLOL 0.2%-0.5%
1 DROPS OPHTHALMIC (EYE) 2 TIMES DAILY
COMMUNITY
Start: 2021-10-28

## 2021-12-15 ASSESSMENT — PATIENT HEALTH QUESTIONNAIRE - PHQ9
SUM OF ALL RESPONSES TO PHQ QUESTIONS 1-9: 0
1. LITTLE INTEREST OR PLEASURE IN DOING THINGS: 0
SUM OF ALL RESPONSES TO PHQ QUESTIONS 1-9: 0
SUM OF ALL RESPONSES TO PHQ QUESTIONS 1-9: 0
2. FEELING DOWN, DEPRESSED OR HOPELESS: 0
SUM OF ALL RESPONSES TO PHQ9 QUESTIONS 1 & 2: 0

## 2021-12-15 ASSESSMENT — ENCOUNTER SYMPTOMS
GASTROINTESTINAL NEGATIVE: 1
RESPIRATORY NEGATIVE: 1

## 2021-12-15 NOTE — PROGRESS NOTES
Wong Poole (:  1960) is a 59 y.o. male,Established patient, here for evaluation of the following chief complaint(s):  Snoring        Subjective   SUBJECTIVE/OBJECTIVE:  HPI:    Chief Complaint   Patient presents with    Snoring     Pt here to discuss his snoring. His kids have noticed that his snoring is getting worse. Will feel well rested in the am.    No hx of DEANDRE. Denies bruxism. Does admit to sinus congestion, worse at night. Patient Active Problem List   Diagnosis    HTN (hypertension)    IFG (impaired fasting glucose)    Hyperlipidemia with target LDL less than 100    Elevated PSA    Chronic prostatitis    Benign prostatic hyperplasia with lower urinary tract symptoms    [address]OTTIE ([facility] [address]) [address] [address]    Urinary retention    Enlarged prostate    CKD (chronic kidney disease), stage II    Normocytic anemia    Obesity (BMI 30-39. 9)     Past Surgical History:   Procedure Laterality Date    ACHILLES TENDON SURGERY      CORNEAL TRANSPLANT  2013    CYSTOSCOPY N/A 10/28/2020    CYSTOSCOPY,GREENLIGHT PHOTOVAPORIZATION OF PROSTATE performed by Genesis Watters MD at [facility]      \"re-work on corneal trasplant\"    [place] EGD [address] Left 2018    EGD FOREIGN BODY REMOVAL performed by Pasquale Mendoza MD at [place] [address]  2014    bx- negative    ULTRASOUND PROSTATE/TRANSRECTAL N/A 10/14/2020    CYSTOSCOY, TRANSRECTAL ULTRASOUND GUIDED PROSTATE BIOPSY performed by Genesis Watters MD at [facility] History     Tobacco Use    Smoking status: Never Smoker    Smokeless tobacco: Never Used   Vaping Use    Vaping Use: Former   Substance Use Topics    Alcohol use: Yes     Alcohol/week: 2.0 standard drinks     Types: 2 Cans of beer per week     Comment: 3 beer per week    Drug use: No         Review of Systems   Constitutional: Negative. HENT: Negative. Respiratory: Negative.     Cardiovascular: Negative. Gastrointestinal: Negative. Musculoskeletal: Negative. Psychiatric/Behavioral: Positive for sleep disturbance. All other systems reviewed and are negative. Objective   Physical Exam  Vitals and nursing note reviewed. Constitutional:       General: He is not in acute distress. Appearance: Normal appearance. He is well-developed. HENT:      Head: Normocephalic and atraumatic. Right Ear: Tympanic membrane normal.      Left Ear: Tympanic membrane normal.      Nose: Mucosal edema and congestion present. Eyes:      Conjunctiva/sclera: Conjunctivae normal.   Cardiovascular:      Rate and Rhythm: Normal rate and regular rhythm. Heart sounds: Normal heart sounds. No murmur heard. Pulmonary:      Effort: Pulmonary effort is normal.      Breath sounds: Normal breath sounds. No wheezing, rhonchi or rales. Abdominal:      General: There is no distension. Musculoskeletal:      Cervical back: Neck supple. Skin:     General: Skin is warm and dry. Findings: No rash (on exposed surfaces). Neurological:      General: No focal deficit present. Mental Status: He is alert. Psychiatric:         Attention and Perception: Attention normal.         Mood and Affect: Mood normal.         Speech: Speech normal.         Behavior: Behavior normal. Behavior is cooperative. Thought Content: Thought content normal.         Judgment: Judgment normal.               ASSESSMENT/PLAN:  1. Snoring    -  Symptoms not seem cw DEANDRE  -  Trial mouth guard and elevating head of bed  -  If no relief, recommend Nasacort NS and nasal strips  -  Ultimately, may benefit from Pulm or ENT eval    Return if symptoms worsen or fail to improve. An electronic signature was used to authenticate this note.     --Brent Dinh DO

## 2021-12-15 NOTE — PATIENT INSTRUCTIONS
You may receive a survey regarding the care you received during your visit. Your input is valuable to us. We encourage you to complete and return your survey. We hope you will choose us in the future for your healthcare needs. 1.  Trial mouth guard  2. Nasacort nasal spray and Breathe-rite strips  3.   Consider ENT evaluation vs sleep evaluation

## 2021-12-19 RX ORDER — ATORVASTATIN CALCIUM 20 MG/1
TABLET, FILM COATED ORAL
Qty: 90 TABLET | Refills: 3 | Status: SHIPPED | OUTPATIENT
Start: 2021-12-19

## 2022-01-21 ENCOUNTER — PATIENT MESSAGE (OUTPATIENT)
Dept: FAMILY MEDICINE CLINIC | Age: 62
End: 2022-01-21

## 2022-01-21 RX ORDER — PREDNISONE 20 MG/1
20 TABLET ORAL 2 TIMES DAILY
Qty: 10 TABLET | Refills: 0 | Status: SHIPPED | OUTPATIENT
Start: 2022-01-21 | End: 2022-01-26

## 2022-01-21 NOTE — TELEPHONE ENCOUNTER
From: Zuleika Li  To: Dr. Osorio Arndt  Sent: 1/21/2022 10:46 AM EST  Subject: Sore throat issues     I have a sore throat for a 5 days and it gets better one day and worse another ! Should I schedule a visit with the doctor ? Another option goto urgent care ?

## 2022-03-18 RX ORDER — FINASTERIDE 5 MG/1
TABLET, FILM COATED ORAL
Qty: 90 TABLET | Refills: 0 | Status: SHIPPED | OUTPATIENT
Start: 2022-03-18 | End: 2022-03-22

## 2022-03-18 NOTE — TELEPHONE ENCOUNTER
Thea Galloway called requesting a refill on the following medications:  Requested Prescriptions     Pending Prescriptions Disp Refills    finasteride (PROSCAR) 5 MG tablet [Pharmacy Med Name: Finasteride Oral Tablet 5 MG] 90 tablet 0     Sig: TAKE 1 TABLET BY [address] SIFUENTES verified:  .pv      Date of last visit: 05/05/2021  Date of next visit (if applicable): 5/4/1534

## 2022-03-22 RX ORDER — FINASTERIDE 5 MG/1
TABLET, FILM COATED ORAL
Qty: 90 TABLET | Refills: 0 | Status: SHIPPED | OUTPATIENT
Start: 2022-03-22 | End: 2022-09-19

## 2022-05-02 ENCOUNTER — NURSE ONLY (OUTPATIENT)
Dept: LAB | Age: 62
End: 2022-05-02

## 2022-05-02 DIAGNOSIS — R97.20 ELEVATED PSA: ICD-10-CM

## 2022-05-02 LAB — PROSTATE SPECIFIC ANTIGEN: 4.18 NG/ML (ref 0–1)

## 2022-05-04 ENCOUNTER — OFFICE VISIT (OUTPATIENT)
Dept: UROLOGY | Age: 62
End: 2022-05-04
Payer: COMMERCIAL

## 2022-05-04 VITALS
BODY MASS INDEX: 32.98 KG/M2 | HEIGHT: 74 IN | WEIGHT: 257 LBS | DIASTOLIC BLOOD PRESSURE: 72 MMHG | SYSTOLIC BLOOD PRESSURE: 112 MMHG

## 2022-05-04 DIAGNOSIS — N40.1 BENIGN PROSTATIC HYPERPLASIA WITH LOWER URINARY TRACT SYMPTOMS, SYMPTOM DETAILS UNSPECIFIED: ICD-10-CM

## 2022-05-04 DIAGNOSIS — R31.9 HEMATURIA, UNSPECIFIED TYPE: ICD-10-CM

## 2022-05-04 DIAGNOSIS — R97.20 ELEVATED PSA: Primary | ICD-10-CM

## 2022-05-04 PROCEDURE — 1036F TOBACCO NON-USER: CPT | Performed by: UROLOGY

## 2022-05-04 PROCEDURE — G8427 DOCREV CUR MEDS BY ELIG CLIN: HCPCS | Performed by: UROLOGY

## 2022-05-04 PROCEDURE — 99214 OFFICE O/P EST MOD 30 MIN: CPT | Performed by: UROLOGY

## 2022-05-04 PROCEDURE — 3017F COLORECTAL CA SCREEN DOC REV: CPT | Performed by: UROLOGY

## 2022-05-04 PROCEDURE — G8417 CALC BMI ABV UP PARAM F/U: HCPCS | Performed by: UROLOGY

## 2022-05-04 NOTE — PROGRESS NOTES
Dr. Meir Alanis MD  [facility])  Urology Clinic      Patient:  Benita Royal  YOB: 1960  Date: 5/4/2022    HISTORY OF PRESENT ILLNESS:   The patient is a 59 y.o. male who presents today for follow-up for the following problem(s): BPH with outlet obstruction and elevated PSA. Negative biopsy in 2020 and PVP in late 2020 after bioopsy. Having mild UUI but this is better. He had some hematuria after PVP. Cysto showed mild regrwoth at apex and finasteride was started. Overall the problem(s) : show no change. Associated Symptoms: No dysuria, gross hematuria. Pain Severity: 0/10    Summary of old records:   Biopsy 2020 and 2016 - both egative  PVP late 2020. Feb 2021 finasteride started. PSA was ~8.5 at the time. Imaging/Labs reviewed during today's visit:  I have independently reviewed and verified the following films during today's visit. None    Last several PSA's:  Lab Results   Component Value Date    PSA 4.18 (H) 05/02/2022    PSA 6.04 (H) 07/07/2021    PSA 5.62 (H) 04/26/2021       Last total testosterone:  No results found for: TESTOSTERONE    Urinalysis today:  No results found for this visit on 05/04/22. Last BUN and creatinine:  Lab Results   Component Value Date    BUN 14 07/19/2021     Lab Results   Component Value Date    CREATININE 1.2 07/19/2021       Imaging Reviewed during this Office Visit:   (results were independently reviewed by physician and radiology report verified)    PAST MEDICAL, FAMILY AND SOCIAL HISTORY UPDATE:  Past Medical History:   Diagnosis Date    Acute kidney injury ([address] 9/25/2020    Elevated PSA     Hyperlipidemia LDL goal < 100 2/26/2013    Hypertension     Obesity (BMI 30-39. 9)      Past Surgical History:   Procedure Laterality Date    ACHILLES TENDON SURGERY      CORNEAL TRANSPLANT  7/2013    CYSTOSCOPY N/A 10/28/2020    CYSTOSCOPY,GREENLIGHT PHOTOVAPORIZATION OF PROSTATE performed by Meir Alanis MD at [facility] 2014    \"re-work on corneal trasplant\"    CO EGD FLEXIBLE FOREIGN BODY REMOVAL Left 1/18/2018    EGD FOREIGN BODY REMOVAL performed by Sushma Stout MD at [place] [address]  2-4-2014    bx- negative    ULTRASOUND PROSTATE/TRANSRECTAL N/A 10/14/2020    CYSTOSCOY, TRANSRECTAL ULTRASOUND GUIDED PROSTATE BIOPSY performed by Sachi Stoddard MD at [address] [place] [address] History   Problem Relation Age of Onset    High Blood Pressure Father     Heart Disease Father     Colon Cancer Neg Hx     Cancer Neg Hx     Stroke Neg Hx      Outpatient Medications Marked as Taking for the 5/4/22 encounter (Office Visit) with Sachi Stoddard MD   Medication Sig Dispense Refill    finasteride (PROSCAR) 5 MG tablet TAKE 1 TABLET BY MOUTH EVERY DAY 90 tablet 0    atorvastatin (LIPITOR) 20 MG tablet TAKE 1 TABLET BY MOUTH ONE TIME A DAY 90 tablet 3    COMBIGAN 0.2-0.5 % ophthalmic solution Place 1 drop into the right eye 2 times daily      amLODIPine (NORVASC) 5 MG tablet TAKE 1 TABLET BY MOUTH EVERY DAY 90 tablet 3    aspirin 81 MG tablet Take 81 mg by mouth daily      MULTIPLE VITAMIN PO Take by mouth daily          Shellfish-derived products, Lisinopril, and Nsaids  Social History     Tobacco Use   Smoking Status Never Smoker   Smokeless Tobacco Never Used       Social History     Substance and Sexual Activity   Alcohol Use Yes    Alcohol/week: 2.0 standard drinks    Types: 2 Cans of beer per week    Comment: 3 beer per week       REVIEW OF SYSTEMS:  Constitutional: negative  Eyes: negative  Respiratory: negative  Cardiovascular: negative  Gastrointestinal: negative  Musculoskeletal: negative  Genitourinary: negative except for what is in HPI  Skin: negative   Neurological: negative  Hematological/Lymphatic: negative  Psychological: negative    Physical Exam:      Vitals:    05/04/22 1327   BP: 112/72     Patient is a 59 y.o. male in no acute distress and alert and oriented to person, place and time.   NAD, A/o  Non labored respiration  Normal peripheral pulses  Skin- warm and dry  Psych- normal mood and affect      Assessment and Plan      1. Elevated PSA    2. Benign prostatic hyperplasia with lower urinary tract symptoms, symptom details unspecified    3. Hematuria, unspecified type           Plan:      No follow-ups on file. Finasteride 5 mg  PSA is responding to proscar. PSA now normal. Negative bx x 2. See back in 1 year with PSA  Urination much better after PVP.           Dr. Meir Alanis MD

## 2022-06-15 NOTE — PATIENT INSTRUCTIONS
You may receive a survey regarding the care you received during your visit. Your input is valuable to us. We encourage you to complete and return your survey. We hope you will choose us in the future for your healthcare needs. [Normal] : oriented to person, place and time, the affect was normal, the mood was normal and not anxious [Extraocular Movements] : extraocular movements were intact [Outer Ear] : the ears and nose were normal in appearance [de-identified] : stable ataxia

## 2022-07-11 ENCOUNTER — TELEPHONE (OUTPATIENT)
Dept: FAMILY MEDICINE CLINIC | Age: 62
End: 2022-07-11

## 2022-07-11 ENCOUNTER — NURSE ONLY (OUTPATIENT)
Dept: LAB | Age: 62
End: 2022-07-11

## 2022-07-11 DIAGNOSIS — E78.5 HYPERLIPIDEMIA WITH TARGET LDL LESS THAN 100: ICD-10-CM

## 2022-07-11 DIAGNOSIS — R73.01 IFG (IMPAIRED FASTING GLUCOSE): ICD-10-CM

## 2022-07-11 DIAGNOSIS — E78.5 HYPERLIPIDEMIA WITH TARGET LDL LESS THAN 100: Primary | ICD-10-CM

## 2022-07-11 DIAGNOSIS — I10 ESSENTIAL HYPERTENSION: ICD-10-CM

## 2022-07-11 LAB
ALBUMIN SERPL-MCNC: 4.7 G/DL (ref 3.5–5.1)
ALP BLD-CCNC: 97 U/L (ref 38–126)
ALT SERPL-CCNC: 20 U/L (ref 11–66)
ANION GAP SERPL CALCULATED.3IONS-SCNC: 15 MEQ/L (ref 8–16)
AST SERPL-CCNC: 21 U/L (ref 5–40)
AVERAGE GLUCOSE: 108 MG/DL (ref 70–126)
BASOPHILS # BLD: 0.5 %
BASOPHILS ABSOLUTE: 0 THOU/MM3 (ref 0–0.1)
BILIRUB SERPL-MCNC: 0.7 MG/DL (ref 0.3–1.2)
BUN BLDV-MCNC: 14 MG/DL (ref 7–22)
CALCIUM SERPL-MCNC: 9.4 MG/DL (ref 8.5–10.5)
CHLORIDE BLD-SCNC: 104 MEQ/L (ref 98–111)
CHOLESTEROL, TOTAL: 162 MG/DL (ref 100–199)
CO2: 23 MEQ/L (ref 23–33)
CREAT SERPL-MCNC: 1 MG/DL (ref 0.4–1.2)
EOSINOPHIL # BLD: 1.9 %
EOSINOPHILS ABSOLUTE: 0.1 THOU/MM3 (ref 0–0.4)
ERYTHROCYTE [DISTWIDTH] IN BLOOD BY AUTOMATED COUNT: 13.3 % (ref 11.5–14.5)
ERYTHROCYTE [DISTWIDTH] IN BLOOD BY AUTOMATED COUNT: 46.2 FL (ref 35–45)
GFR SERPL CREATININE-BSD FRML MDRD: 75 ML/MIN/1.73M2
GLUCOSE BLD-MCNC: 91 MG/DL (ref 70–108)
HBA1C MFR BLD: 5.6 % (ref 4.4–6.4)
HCT VFR BLD CALC: 46.4 % (ref 42–52)
HDLC SERPL-MCNC: 60 MG/DL
HEMOGLOBIN: 15.1 GM/DL (ref 14–18)
IMMATURE GRANS (ABS): 0.01 THOU/MM3 (ref 0–0.07)
IMMATURE GRANULOCYTES: 0.2 %
LDL CHOLESTEROL CALCULATED: 87 MG/DL
LYMPHOCYTES # BLD: 32.5 %
LYMPHOCYTES ABSOLUTE: 1.9 THOU/MM3 (ref 1–4.8)
MCH RBC QN AUTO: 30.7 PG (ref 26–33)
MCHC RBC AUTO-ENTMCNC: 32.5 GM/DL (ref 32.2–35.5)
MCV RBC AUTO: 94.3 FL (ref 80–94)
MONOCYTES # BLD: 7.2 %
MONOCYTES ABSOLUTE: 0.4 THOU/MM3 (ref 0.4–1.3)
NUCLEATED RED BLOOD CELLS: 0 /100 WBC
PLATELET # BLD: 210 THOU/MM3 (ref 130–400)
PMV BLD AUTO: 9.9 FL (ref 9.4–12.4)
POTASSIUM SERPL-SCNC: 4.2 MEQ/L (ref 3.5–5.2)
RBC # BLD: 4.92 MILL/MM3 (ref 4.7–6.1)
SEG NEUTROPHILS: 57.7 %
SEGMENTED NEUTROPHILS ABSOLUTE COUNT: 3.3 THOU/MM3 (ref 1.8–7.7)
SODIUM BLD-SCNC: 142 MEQ/L (ref 135–145)
TOTAL PROTEIN: 6.8 G/DL (ref 6.1–8)
TRIGL SERPL-MCNC: 75 MG/DL (ref 0–199)
TSH SERPL DL<=0.05 MIU/L-ACNC: 0.52 UIU/ML (ref 0.4–4.2)
WBC # BLD: 5.7 THOU/MM3 (ref 4.8–10.8)

## 2022-07-11 NOTE — TELEPHONE ENCOUNTER
Spoke with patient, he has a upcoming appointment on 7/18/22 asking for lab orders to complete prior. Patient will use a new vision lab. He will do labs this afternoon. Please place orders.

## 2022-07-18 ENCOUNTER — OFFICE VISIT (OUTPATIENT)
Dept: FAMILY MEDICINE CLINIC | Age: 62
End: 2022-07-18
Payer: COMMERCIAL

## 2022-07-18 ENCOUNTER — OFFICE VISIT (OUTPATIENT)
Dept: NEPHROLOGY | Age: 62
End: 2022-07-18
Payer: COMMERCIAL

## 2022-07-18 VITALS
DIASTOLIC BLOOD PRESSURE: 88 MMHG | HEIGHT: 74 IN | SYSTOLIC BLOOD PRESSURE: 138 MMHG | RESPIRATION RATE: 20 BRPM | HEART RATE: 84 BPM | WEIGHT: 243.6 LBS | BODY MASS INDEX: 31.26 KG/M2

## 2022-07-18 VITALS
HEART RATE: 79 BPM | OXYGEN SATURATION: 98 % | WEIGHT: 244.8 LBS | BODY MASS INDEX: 31.43 KG/M2 | DIASTOLIC BLOOD PRESSURE: 86 MMHG | SYSTOLIC BLOOD PRESSURE: 137 MMHG

## 2022-07-18 DIAGNOSIS — I10 ESSENTIAL HYPERTENSION: Primary | ICD-10-CM

## 2022-07-18 DIAGNOSIS — R97.20 ELEVATED PSA: ICD-10-CM

## 2022-07-18 DIAGNOSIS — E66.9 OBESITY (BMI 35.0-39.9 WITHOUT COMORBIDITY): ICD-10-CM

## 2022-07-18 DIAGNOSIS — N40.1 BENIGN PROSTATIC HYPERPLASIA WITH URINARY OBSTRUCTION: ICD-10-CM

## 2022-07-18 DIAGNOSIS — E78.5 HYPERLIPIDEMIA WITH TARGET LDL LESS THAN 100: ICD-10-CM

## 2022-07-18 DIAGNOSIS — N13.8 BENIGN PROSTATIC HYPERPLASIA WITH URINARY OBSTRUCTION: ICD-10-CM

## 2022-07-18 DIAGNOSIS — N18.2 CKD (CHRONIC KIDNEY DISEASE), STAGE II: Primary | ICD-10-CM

## 2022-07-18 DIAGNOSIS — I10 ESSENTIAL HYPERTENSION: ICD-10-CM

## 2022-07-18 DIAGNOSIS — R73.01 IFG (IMPAIRED FASTING GLUCOSE): ICD-10-CM

## 2022-07-18 DIAGNOSIS — E78.5 DYSLIPIDEMIA: ICD-10-CM

## 2022-07-18 PROCEDURE — G8427 DOCREV CUR MEDS BY ELIG CLIN: HCPCS | Performed by: FAMILY MEDICINE

## 2022-07-18 PROCEDURE — 99213 OFFICE O/P EST LOW 20 MIN: CPT | Performed by: INTERNAL MEDICINE

## 2022-07-18 PROCEDURE — 1036F TOBACCO NON-USER: CPT | Performed by: FAMILY MEDICINE

## 2022-07-18 PROCEDURE — 3017F COLORECTAL CA SCREEN DOC REV: CPT | Performed by: INTERNAL MEDICINE

## 2022-07-18 PROCEDURE — 3017F COLORECTAL CA SCREEN DOC REV: CPT | Performed by: FAMILY MEDICINE

## 2022-07-18 PROCEDURE — 1036F TOBACCO NON-USER: CPT | Performed by: INTERNAL MEDICINE

## 2022-07-18 PROCEDURE — G8417 CALC BMI ABV UP PARAM F/U: HCPCS | Performed by: INTERNAL MEDICINE

## 2022-07-18 PROCEDURE — 99214 OFFICE O/P EST MOD 30 MIN: CPT | Performed by: FAMILY MEDICINE

## 2022-07-18 PROCEDURE — G8427 DOCREV CUR MEDS BY ELIG CLIN: HCPCS | Performed by: INTERNAL MEDICINE

## 2022-07-18 PROCEDURE — G8417 CALC BMI ABV UP PARAM F/U: HCPCS | Performed by: FAMILY MEDICINE

## 2022-07-18 RX ORDER — AMOXICILLIN 250 MG/1
CAPSULE ORAL
COMMUNITY
Start: 2022-06-29

## 2022-07-18 RX ORDER — PREDNISOLONE ACETATE 10 MG/ML
SUSPENSION/ DROPS OPHTHALMIC
COMMUNITY
Start: 2022-07-05

## 2022-07-18 SDOH — ECONOMIC STABILITY: FOOD INSECURITY: WITHIN THE PAST 12 MONTHS, THE FOOD YOU BOUGHT JUST DIDN'T LAST AND YOU DIDN'T HAVE MONEY TO GET MORE.: NEVER TRUE

## 2022-07-18 SDOH — ECONOMIC STABILITY: FOOD INSECURITY: WITHIN THE PAST 12 MONTHS, YOU WORRIED THAT YOUR FOOD WOULD RUN OUT BEFORE YOU GOT MONEY TO BUY MORE.: NEVER TRUE

## 2022-07-18 ASSESSMENT — ENCOUNTER SYMPTOMS
RESPIRATORY NEGATIVE: 1
GASTROINTESTINAL NEGATIVE: 1

## 2022-07-18 ASSESSMENT — PATIENT HEALTH QUESTIONNAIRE - PHQ9
SUM OF ALL RESPONSES TO PHQ QUESTIONS 1-9: 0
SUM OF ALL RESPONSES TO PHQ QUESTIONS 1-9: 0
2. FEELING DOWN, DEPRESSED OR HOPELESS: 0
1. LITTLE INTEREST OR PLEASURE IN DOING THINGS: 0
SUM OF ALL RESPONSES TO PHQ QUESTIONS 1-9: 0
SUM OF ALL RESPONSES TO PHQ9 QUESTIONS 1 & 2: 0
SUM OF ALL RESPONSES TO PHQ QUESTIONS 1-9: 0

## 2022-07-18 ASSESSMENT — SOCIAL DETERMINANTS OF HEALTH (SDOH): HOW HARD IS IT FOR YOU TO PAY FOR THE VERY BASICS LIKE FOOD, HOUSING, MEDICAL CARE, AND HEATING?: NOT HARD AT ALL

## 2022-07-18 NOTE — PROGRESS NOTES
2022    Marina Enriquez (:  1960) is a 59 y.o. male, here for a preventive medicine evaluation. Chief Complaint   Patient presents with    Annual Exam     BPs and weight stable. BP Readings from Last 3 Encounters:   22 138/88   22 137/86   22 112/72     Wt Readings from Last 3 Encounters:   22 243 lb 9.6 oz (110.5 kg)   22 244 lb 12.8 oz (111 kg)   22 257 lb (116.6 kg)     Continues to follow closely with multiple specialists. Patient Active Problem List   Diagnosis    HTN (hypertension)    IFG (impaired fasting glucose)    Hyperlipidemia with target LDL less than 100    Elevated PSA    Chronic prostatitis    Benign prostatic hyperplasia with lower urinary tract symptoms    SCOTTIE (acute kidney injury) ([facility] [address]    Urinary retention    Enlarged prostate    CKD (chronic kidney disease), stage II    Normocytic anemia    Obesity (BMI 30-39. 9)       Review of Systems   Constitutional: Negative. HENT: Negative. Respiratory: Negative. Cardiovascular: Negative. Gastrointestinal: Negative. Musculoskeletal: Negative. All other systems reviewed and are negative. Prior to Visit Medications    Medication Sig Taking?  Authorizing Provider   amoxicillin (AMOXIL) 250 MG capsule GIVE 1 CAPSULE BY MOUTH 3 TIMES A DAY UNTIL GONE, START 3 TO 4 DAYS PRIOR TO APPOINTMENT Yes Historical Provider, MD   prednisoLONE acetate (PRED FORTE) 1 % ophthalmic suspension INSTILL 1 DROP INTO RIGHT EYE EVERY 2 WEEKS Yes Historical Provider, MD   finasteride (PROSCAR) 5 MG tablet TAKE 1 TABLET BY MOUTH EVERY DAY Yes Mike Calix APRN - CNP   atorvastatin (LIPITOR) 20 MG tablet TAKE 1 TABLET BY MOUTH ONE TIME A DAY Yes Edie Dinero DO   COMBIGAN 0.2-0.5 % ophthalmic solution Place 1 drop into the right eye 2 times daily Yes Historical Provider, MD   amLODIPine (NORVASC) 5 MG tablet TAKE 1 TABLET BY MOUTH EVERY DAY Yes Edie Dinero DO   aspirin 81 MG tablet Behavior is cooperative. Thought Content: Thought content normal.         Judgment: Judgment normal.       No flowsheet data found.     Lab Results   Component Value Date/Time    CHOL 162 07/11/2022 12:53 PM    CHOL 161 07/07/2021 12:46 PM    CHOL 157 07/30/2020 10:30 AM    TRIG 75 07/11/2022 12:53 PM    TRIG 129 07/07/2021 12:46 PM    TRIG 98 07/30/2020 10:30 AM    HDL 60 07/11/2022 12:53 PM    HDL 54 07/07/2021 12:46 PM    HDL 54 07/30/2020 10:30 AM    LDLCALC 87 07/11/2022 12:53 PM    LDLCALC 81 07/07/2021 12:46 PM    LDLCALC 83 07/30/2020 10:30 AM    GLUCOSE 91 07/11/2022 12:53 PM    LABA1C 5.6 07/11/2022 12:53 PM    LABA1C 6.2 07/07/2021 12:46 PM    LABA1C 6.1 09/26/2020 04:15 AM       The 10-year ASCVD risk score (Rosa Isela Singh, et al., 2013) is: 15.3%    Values used to calculate the score:      Age: 59 years      Sex: Male      Is Non- : Yes      Diabetic: No      Tobacco smoker: No      Systolic Blood Pressure: 180 mmHg      Is BP treated: Yes      HDL Cholesterol: 60 mg/dL      Total Cholesterol: 162 mg/dL    Immunization History   Administered Date(s) Administered    COVID-19, PFIZER GRAY top, DO NOT Dilute, (age 15 y+), IM, 30 mcg/0.3 mL 04/12/2022    COVID-19, PFIZER PURPLE top, DILUTE for use, (age 15 y+), 30mcg/0.3mL 03/10/2021, 03/31/2021, 09/30/2021    Influenza Vaccine, unspecified formulation 11/01/2016    Influenza Virus Vaccine 11/01/2014, 10/22/2015, 10/06/2019, 08/06/2020    Influenza, Triv, 3 Years and older, IM (Afluria (5 yrs and older) 09/15/2017    Tdap (Boostrix, Adacel) 04/09/2015       Health Maintenance   Topic Date Due    HIV screen  Never done    Hepatitis C screen  Never done    Shingles vaccine (1 of 2) Never done    Flu vaccine (1) 09/01/2022    Depression Screen  12/15/2022    Prostate Specific Antigen (PSA) Screening or Monitoring  05/02/2023    A1C test (Diabetic or Prediabetic)  07/11/2023    Lipids  07/11/2023    DTaP/Tdap/Td vaccine (2 - Td or Tdap) 04/09/2025    Colorectal Cancer Screen  09/29/2027    COVID-19 Vaccine  Completed    Hepatitis A vaccine  Aged Out    Hepatitis B vaccine  Aged Out    Hib vaccine  Aged Out    Meningococcal (ACWY) vaccine  Aged Out    Pneumococcal 0-64 years Vaccine  Aged Out       Assessment & Plan   Essential hypertension  Hyperlipidemia with target LDL less than 100  IFG (impaired fasting glucose)  Benign prostatic hyperplasia with urinary obstruction  Obesity (BMI 35.0-39.9 without comorbidity)    -  Chronic medical problems stable  -  labs reviewed, look fine  -  Continue current medications  -  Follow up with specialists as scheduled    Return in about 1 year (around 7/18/2023) for Wellness.          --Vickie Gary, DO

## 2022-07-18 NOTE — PROGRESS NOTES
Renal Progress Note    Assessment and Plan:      Diagnosis Orders   1. CKD (chronic kidney disease), stage II        2. Essential hypertension        3. Dyslipidemia        4. Elevated PSA                  PLAN:  Labs reviewed with the patient   He understood   Serum creatinine is good at 1.0 mg/dL. Medications reviewed  No changes  Return visit as needed          Patient Active Problem List   Diagnosis    HTN (hypertension)    IFG (impaired fasting glucose)    Hyperlipidemia with target LDL less than 100    Elevated PSA    Chronic prostatitis    Benign prostatic hyperplasia with lower urinary tract symptoms    SCOTTIE (acute kidney injury) ([address]    Urinary retention    Enlarged prostate    CKD (chronic kidney disease), stage II    Normocytic anemia    Obesity (BMI 30-39. 9)           Subjective:   Chief complaint:  Chief Complaint   Patient presents with    Chronic Kidney Disease     Stage II      HPI:This is a follow up visit for Mr. Kyle Harrison is here today for return appointment. I see him for chronic kidney disease. He was last seen about 12 months ago. Juani York No chest pain. No shortness of breath. No nausea or vomiting. No fever or chills. No headaches. No difficulties with urination. ROS:  Pertinent positives stated above in HPI. All other systems were reviewed and were negative.   Medications:     Current Outpatient Medications   Medication Sig Dispense Refill    amoxicillin (AMOXIL) 250 MG capsule GIVE 1 CAPSULE BY MOUTH 3 TIMES A DAY UNTIL GONE, START 3 TO 4 DAYS PRIOR TO APPOINTMENT      prednisoLONE acetate (PRED FORTE) 1 % ophthalmic suspension INSTILL 1 DROP INTO RIGHT EYE EVERY 2 WEEKS      finasteride (PROSCAR) 5 MG tablet TAKE 1 TABLET BY MOUTH EVERY DAY 90 tablet 0    atorvastatin (LIPITOR) 20 MG tablet TAKE 1 TABLET BY MOUTH ONE TIME A DAY 90 tablet 3    COMBIGAN 0.2-0.5 % ophthalmic solution Place 1 drop into the right eye 2 times daily      amLODIPine (NORVASC) 5 MG tablet TAKE 1 TABLET BY MOUTH EVERY DAY 90 tablet 3    aspirin 81 MG tablet Take 81 mg by mouth daily      MULTIPLE VITAMIN PO Take by mouth daily        No current facility-administered medications for this visit.        Lab Results:    CBC:   Lab Results   Component Value Date    WBC 5.7 07/11/2022    HGB 15.1 07/11/2022    HCT 46.4 07/11/2022    MCV 94.3 (H) 07/11/2022     07/11/2022     BMP:    Lab Results   Component Value Date     07/11/2022     07/19/2021     01/04/2021    K 4.2 07/11/2022    K 3.7 07/19/2021    K 3.7 01/04/2021     07/11/2022     07/19/2021     01/04/2021    CO2 23 07/11/2022    CO2 22 (L) 07/19/2021    CO2 21 (L) 01/04/2021    BUN 14 07/11/2022    BUN 14 07/19/2021    BUN 13 01/04/2021    CREATININE 1.0 07/11/2022    CREATININE 1.2 07/19/2021    CREATININE 1.2 01/04/2021    GLUCOSE 91 07/11/2022    GLUCOSE 110 (H) 07/19/2021    GLUCOSE 122 (H) 01/04/2021      Hepatic:   Lab Results   Component Value Date    AST 21 07/11/2022    AST 22 07/07/2021    AST 30 09/25/2020    ALT 20 07/11/2022    ALT 21 07/07/2021    ALT 51 09/25/2020    BILITOT 0.7 07/11/2022    BILITOT 0.6 07/07/2021    BILITOT 0.4 09/25/2020    ALKPHOS 97 07/11/2022    ALKPHOS 88 07/07/2021    ALKPHOS 88 09/25/2020     BNP: No results found for: BNP  Lipids:   Lab Results   Component Value Date    CHOL 162 07/11/2022    HDL 60 07/11/2022     INR: No results found for: INR  URINE:   Lab Results   Component Value Date/Time    NAUR 73 09/26/2020 04:10 AM    PROTUR Negative 05/05/2021 12:49 PM     Lab Results   Component Value Date/Time    NITRU Negative 05/05/2021 12:49 PM    COLORU Yellow 05/05/2021 12:49 PM    COLORU YELLOW 09/25/2020 07:25 PM    PHUR 5.5 09/25/2020 07:25 PM    WBCUA 0-2 09/25/2020 07:25 PM    RBCUA 0-2 09/25/2020 07:25 PM    YEAST NONE SEEN 09/25/2020 07:25 PM    BACTERIA NONE SEEN 09/25/2020 07:25 PM    SPECGRAV 1.006 09/04/2020 10:10 AM    LEUKOCYTESUR NEGATIVE 09/25/2020 07:25 PM UROBILINOGEN 0.20 05/05/2021 12:49 PM    BILIRUBINUR Negative 05/05/2021 12:49 PM    BLOODU Trace-lysed 05/05/2021 12:49 PM    BLOODU TRACE 09/25/2020 07:25 PM    GLUCOSEU Negative 05/05/2021 12:49 PM    KETUA Negative 05/05/2021 12:49 PM      Microalbumen/Creatinine ratio:  No components found for: RUCREAT    Objective:   Vitals: /86 (Site: Right Upper Arm, Position: Sitting)   Pulse 79   Wt 244 lb 12.8 oz (111 kg)   SpO2 98%   BMI 31.43 kg/m²      Constitutional:  Alert, awake, no apparent distress  Skin:normal with no rash or any significant lesions  HEENT:Pupils are reactive . Throat is clear. Oral mucosa is moist.  Neck:supple with no thyromegaly, JVD, lymphadenopathy or bruit   Cardiovascular: Regular sinus rhythm without murmur, rubs or gallops   Respiratory:  Clear to auscultation with no wheezes or rales  Abdomen: Good bowel sound, soft, non tender and no bruit  Ext: No LE edema  Musculoskeletal:Intact  Neuro:Alert, awake and oriented with no obvious focal deficit. Speech is normal.**    Electronically signed by Lety Porter MD on 7/18/2022 at 9:44 AM   **This report has been created using voice recognition software. It maycontain minor  errors which are inherent in voice recognition technology. **

## 2022-09-06 RX ORDER — AMLODIPINE BESYLATE 5 MG/1
TABLET ORAL
Qty: 90 TABLET | Refills: 0 | Status: SHIPPED | OUTPATIENT
Start: 2022-09-06 | End: 2022-10-29 | Stop reason: SDUPTHER

## 2022-09-19 RX ORDER — FINASTERIDE 5 MG/1
TABLET, FILM COATED ORAL
Qty: 90 TABLET | Refills: 0 | Status: SHIPPED | OUTPATIENT
Start: 2022-09-19 | End: 2022-11-02 | Stop reason: SDUPTHER

## 2022-09-19 NOTE — TELEPHONE ENCOUNTER
Kelsea York called requesting a refill on the following medications:  Requested Prescriptions     Pending Prescriptions Disp Refills    finasteride (PROSCAR) 5 MG tablet [Pharmacy Med Name: Finasteride Oral Tablet 5 MG] 90 tablet 0     Sig: TAKE 1 TABLET BY [address] SIFUENTES verified:  .pv      Date of last visit: 05/04/2022  Date of next visit (if applicable): 79/41/3733

## 2022-10-31 RX ORDER — AMLODIPINE BESYLATE 5 MG/1
TABLET ORAL
Qty: 90 TABLET | Refills: 0 | OUTPATIENT
Start: 2022-10-31

## 2022-10-31 RX ORDER — AMLODIPINE BESYLATE 5 MG/1
TABLET ORAL
Qty: 90 TABLET | Refills: 3 | Status: SHIPPED | OUTPATIENT
Start: 2022-10-31

## 2022-11-02 RX ORDER — FINASTERIDE 5 MG/1
5 TABLET, FILM COATED ORAL DAILY
Qty: 90 TABLET | Refills: 0 | Status: SHIPPED | OUTPATIENT
Start: 2022-11-02

## 2022-11-02 NOTE — TELEPHONE ENCOUNTER
Kvng Rodney called requesting a refill on the following medications:  Requested Prescriptions     Pending Prescriptions Disp Refills    finasteride (PROSCAR) 5 MG tablet 90 tablet 0     Pharmacy verified:  [email or web address]      Date of last visit: 05/05/2021  Date of next visit (if applicable): 04/75/5645

## 2022-11-07 RX ORDER — ATORVASTATIN CALCIUM 20 MG/1
TABLET, FILM COATED ORAL
Qty: 90 TABLET | Refills: 3 | Status: SHIPPED | OUTPATIENT
Start: 2022-11-07

## 2023-04-03 NOTE — PROGRESS NOTES
E-visit reviewed. Additional information needed, will contact pt.  5-10 minutes were spent on the digital evaluation and management of this patient.

## 2023-04-27 ENCOUNTER — NURSE ONLY (OUTPATIENT)
Dept: LAB | Age: 63
End: 2023-04-27

## 2023-04-27 DIAGNOSIS — R97.20 ELEVATED PSA: ICD-10-CM

## 2023-04-27 LAB — PSA SERPL-MCNC: 5.19 NG/ML (ref 0–1)

## 2023-05-03 ENCOUNTER — OFFICE VISIT (OUTPATIENT)
Dept: UROLOGY | Age: 63
End: 2023-05-03
Payer: COMMERCIAL

## 2023-05-03 VITALS
BODY MASS INDEX: 32.34 KG/M2 | DIASTOLIC BLOOD PRESSURE: 84 MMHG | WEIGHT: 252 LBS | SYSTOLIC BLOOD PRESSURE: 118 MMHG | HEIGHT: 74 IN

## 2023-05-03 DIAGNOSIS — N40.1 BENIGN PROSTATIC HYPERPLASIA WITH LOWER URINARY TRACT SYMPTOMS, SYMPTOM DETAILS UNSPECIFIED: Primary | ICD-10-CM

## 2023-05-03 DIAGNOSIS — R97.20 ELEVATED PSA: ICD-10-CM

## 2023-05-03 PROCEDURE — G8417 CALC BMI ABV UP PARAM F/U: HCPCS | Performed by: UROLOGY

## 2023-05-03 PROCEDURE — 1036F TOBACCO NON-USER: CPT | Performed by: UROLOGY

## 2023-05-03 PROCEDURE — 99214 OFFICE O/P EST MOD 30 MIN: CPT | Performed by: UROLOGY

## 2023-05-03 PROCEDURE — 3017F COLORECTAL CA SCREEN DOC REV: CPT | Performed by: UROLOGY

## 2023-05-03 PROCEDURE — 3074F SYST BP LT 130 MM HG: CPT | Performed by: UROLOGY

## 2023-05-03 PROCEDURE — G8427 DOCREV CUR MEDS BY ELIG CLIN: HCPCS | Performed by: UROLOGY

## 2023-05-03 PROCEDURE — 3079F DIAST BP 80-89 MM HG: CPT | Performed by: UROLOGY

## 2023-05-03 NOTE — PROGRESS NOTES
Dr. Kavita Daniels MD  [facility])  Urology Clinic      Patient:  Star Cancer  YOB: 1960  Date: 5/3/2023    HISTORY OF PRESENT ILLNESS:   The patient is a 57 y.o. male who presents today for follow-up for the following problem(s): BPH with outlet obstruction and elevated PSA. Negative biopsy in 2020 and PVP in late 2020 after bioopsy. Having mild UUI but this is better. He had some hematuria after PVP. Cysto showed mild regrwoth at apex and finasteride was started. Overall the problem(s) : show no change. Associated Symptoms: No dysuria, gross hematuria. Pain Severity: 0/10    Summary of old records:   Biopsy 2020 and 2016 - both egative  PVP late 2020. Feb 2021 finasteride started. PSA was ~8.5 at the time. Imaging/Labs reviewed during today's visit:  I have independently reviewed and verified the following films during today's visit. None    Last several PSA's:  Lab Results   Component Value Date    PSA 5.19 (H) 04/27/2023    PSA 4.18 (H) 05/02/2022    PSA 6.04 (H) 07/07/2021       Last total testosterone:  No results found for: TESTOSTERONE    Urinalysis today:  No results found for this visit on 05/03/23. Last BUN and creatinine:  Lab Results   Component Value Date    BUN 14 07/11/2022     Lab Results   Component Value Date    CREATININE 1.0 07/11/2022       Imaging Reviewed during this Office Visit:   (results were independently reviewed by physician and radiology report verified)    PAST MEDICAL, FAMILY AND SOCIAL HISTORY UPDATE:  Past Medical History:   Diagnosis Date    Acute kidney injury ([address] 9/25/2020    Elevated PSA     Hyperlipidemia LDL goal < 100 2/26/2013    Hypertension     Obesity (BMI 30-39. 9)      Past Surgical History:   Procedure Laterality Date    ACHILLES TENDON SURGERY      CORNEAL TRANSPLANT  7/2013    CYSTOSCOPY N/A 10/28/2020    CYSTOSCOPY,GREENLIGHT PHOTOVAPORIZATION OF PROSTATE performed by Kavita Daniels MD at [place] [address]

## 2023-07-08 ENCOUNTER — PATIENT MESSAGE (OUTPATIENT)
Dept: FAMILY MEDICINE CLINIC | Age: 63
End: 2023-07-08

## 2023-07-08 DIAGNOSIS — Z00.00 WELL ADULT HEALTH CHECK: Primary | ICD-10-CM

## 2023-07-10 NOTE — TELEPHONE ENCOUNTER
From: Diana Tam  To: Dr. Kiko Bhatt  Sent: 7/8/2023 12:08 PM EDT  Subject: Blood. Work     I have a annual checkup on the 19 of this month , do I need to  a blood work order at the office or will it be on file at [facility] lab ?

## 2023-07-14 ENCOUNTER — NURSE ONLY (OUTPATIENT)
Dept: LAB | Age: 63
End: 2023-07-14

## 2023-07-14 DIAGNOSIS — Z00.00 WELL ADULT HEALTH CHECK: ICD-10-CM

## 2023-07-14 LAB
ALBUMIN SERPL BCG-MCNC: 4.2 G/DL (ref 3.5–5.1)
ALP SERPL-CCNC: 96 U/L (ref 38–126)
ALT SERPL W/O P-5'-P-CCNC: 25 U/L (ref 11–66)
ANION GAP SERPL CALC-SCNC: 13 MEQ/L (ref 8–16)
AST SERPL-CCNC: 25 U/L (ref 5–40)
BASOPHILS ABSOLUTE: 0 THOU/MM3 (ref 0–0.1)
BASOPHILS NFR BLD AUTO: 0.7 %
BILIRUB SERPL-MCNC: 0.7 MG/DL (ref 0.3–1.2)
BUN SERPL-MCNC: 14 MG/DL (ref 7–22)
CALCIUM SERPL-MCNC: 8.9 MG/DL (ref 8.5–10.5)
CHLORIDE SERPL-SCNC: 105 MEQ/L (ref 98–111)
CHOLEST SERPL-MCNC: 178 MG/DL (ref 100–199)
CO2 SERPL-SCNC: 22 MEQ/L (ref 23–33)
CREAT SERPL-MCNC: 1.1 MG/DL (ref 0.4–1.2)
DEPRECATED MEAN GLUCOSE BLD GHB EST-ACNC: 117 MG/DL (ref 70–126)
DEPRECATED RDW RBC AUTO: 47.4 FL (ref 35–45)
EOSINOPHIL NFR BLD AUTO: 3.7 %
EOSINOPHILS ABSOLUTE: 0.2 THOU/MM3 (ref 0–0.4)
ERYTHROCYTE [DISTWIDTH] IN BLOOD BY AUTOMATED COUNT: 13.3 % (ref 11.5–14.5)
GFR SERPL CREATININE-BSD FRML MDRD: > 60 ML/MIN/1.73M2
GLUCOSE SERPL-MCNC: 87 MG/DL (ref 70–108)
HBA1C MFR BLD HPLC: 5.9 % (ref 4.4–6.4)
HCT VFR BLD AUTO: 48.9 % (ref 42–52)
HDLC SERPL-MCNC: 65 MG/DL
HGB BLD-MCNC: 15.6 GM/DL (ref 14–18)
IMM GRANULOCYTES # BLD AUTO: 0.02 THOU/MM3 (ref 0–0.07)
IMM GRANULOCYTES NFR BLD AUTO: 0.3 %
LDLC SERPL CALC-MCNC: 90 MG/DL
LYMPHOCYTES ABSOLUTE: 1.5 THOU/MM3 (ref 1–4.8)
LYMPHOCYTES NFR BLD AUTO: 25.5 %
MCH RBC QN AUTO: 31 PG (ref 26–33)
MCHC RBC AUTO-ENTMCNC: 31.9 GM/DL (ref 32.2–35.5)
MCV RBC AUTO: 97.2 FL (ref 80–94)
MONOCYTES ABSOLUTE: 0.4 THOU/MM3 (ref 0.4–1.3)
MONOCYTES NFR BLD AUTO: 7.3 %
NEUTROPHILS NFR BLD AUTO: 62.5 %
NRBC BLD AUTO-RTO: 0 /100 WBC
PLATELET # BLD AUTO: 175 THOU/MM3 (ref 130–400)
PMV BLD AUTO: 9.9 FL (ref 9.4–12.4)
POTASSIUM SERPL-SCNC: 4 MEQ/L (ref 3.5–5.2)
PROT SERPL-MCNC: 7.2 G/DL (ref 6.1–8)
RBC # BLD AUTO: 5.03 MILL/MM3 (ref 4.7–6.1)
SEGMENTED NEUTROPHILS ABSOLUTE COUNT: 3.6 THOU/MM3 (ref 1.8–7.7)
SODIUM SERPL-SCNC: 140 MEQ/L (ref 135–145)
TRIGL SERPL-MCNC: 115 MG/DL (ref 0–199)
TSH SERPL DL<=0.005 MIU/L-ACNC: 0.61 UIU/ML (ref 0.4–4.2)
WBC # BLD AUTO: 5.7 THOU/MM3 (ref 4.8–10.8)

## 2023-07-14 ASSESSMENT — PATIENT HEALTH QUESTIONNAIRE - PHQ9
SUM OF ALL RESPONSES TO PHQ QUESTIONS 1-9: 0
SUM OF ALL RESPONSES TO PHQ QUESTIONS 1-9: 0
2. FEELING DOWN, DEPRESSED OR HOPELESS: 0
1. LITTLE INTEREST OR PLEASURE IN DOING THINGS: 0
SUM OF ALL RESPONSES TO PHQ QUESTIONS 1-9: 0
SUM OF ALL RESPONSES TO PHQ QUESTIONS 1-9: 0
SUM OF ALL RESPONSES TO PHQ9 QUESTIONS 1 & 2: 0

## 2023-07-18 SDOH — ECONOMIC STABILITY: TRANSPORTATION INSECURITY
IN THE PAST 12 MONTHS, HAS LACK OF TRANSPORTATION KEPT YOU FROM MEETINGS, WORK, OR FROM GETTING THINGS NEEDED FOR DAILY LIVING?: NO

## 2023-07-18 SDOH — ECONOMIC STABILITY: FOOD INSECURITY: WITHIN THE PAST 12 MONTHS, THE FOOD YOU BOUGHT JUST DIDN'T LAST AND YOU DIDN'T HAVE MONEY TO GET MORE.: NEVER TRUE

## 2023-07-18 SDOH — ECONOMIC STABILITY: HOUSING INSECURITY
IN THE LAST 12 MONTHS, WAS THERE A TIME WHEN YOU DID NOT HAVE A STEADY PLACE TO SLEEP OR SLEPT IN A SHELTER (INCLUDING NOW)?: NO

## 2023-07-18 SDOH — ECONOMIC STABILITY: INCOME INSECURITY: HOW HARD IS IT FOR YOU TO PAY FOR THE VERY BASICS LIKE FOOD, HOUSING, MEDICAL CARE, AND HEATING?: NOT HARD AT ALL

## 2023-07-18 SDOH — ECONOMIC STABILITY: FOOD INSECURITY: WITHIN THE PAST 12 MONTHS, YOU WORRIED THAT YOUR FOOD WOULD RUN OUT BEFORE YOU GOT MONEY TO BUY MORE.: NEVER TRUE

## 2023-07-18 ASSESSMENT — PATIENT HEALTH QUESTIONNAIRE - PHQ9
1. LITTLE INTEREST OR PLEASURE IN DOING THINGS: NOT AT ALL
2. FEELING DOWN, DEPRESSED OR HOPELESS: NOT AT ALL
SUM OF ALL RESPONSES TO PHQ QUESTIONS 1-9: 0
1. LITTLE INTEREST OR PLEASURE IN DOING THINGS: 0
SUM OF ALL RESPONSES TO PHQ9 QUESTIONS 1 & 2: 0
SUM OF ALL RESPONSES TO PHQ QUESTIONS 1-9: 0
SUM OF ALL RESPONSES TO PHQ9 QUESTIONS 1 & 2: 0
2. FEELING DOWN, DEPRESSED OR HOPELESS: 0

## 2023-07-19 ENCOUNTER — OFFICE VISIT (OUTPATIENT)
Dept: FAMILY MEDICINE CLINIC | Age: 63
End: 2023-07-19
Payer: COMMERCIAL

## 2023-07-19 VITALS
SYSTOLIC BLOOD PRESSURE: 126 MMHG | OXYGEN SATURATION: 98 % | HEART RATE: 72 BPM | HEIGHT: 74 IN | BODY MASS INDEX: 32.21 KG/M2 | DIASTOLIC BLOOD PRESSURE: 78 MMHG | TEMPERATURE: 97.4 F | RESPIRATION RATE: 16 BRPM | WEIGHT: 251 LBS

## 2023-07-19 DIAGNOSIS — E66.9 OBESITY (BMI 35.0-39.9 WITHOUT COMORBIDITY): ICD-10-CM

## 2023-07-19 DIAGNOSIS — R73.01 IFG (IMPAIRED FASTING GLUCOSE): ICD-10-CM

## 2023-07-19 DIAGNOSIS — I10 ESSENTIAL HYPERTENSION: ICD-10-CM

## 2023-07-19 DIAGNOSIS — E78.5 HYPERLIPIDEMIA WITH TARGET LDL LESS THAN 100: ICD-10-CM

## 2023-07-19 DIAGNOSIS — Z00.00 WELL ADULT HEALTH CHECK: Primary | ICD-10-CM

## 2023-07-19 DIAGNOSIS — N40.1 BENIGN PROSTATIC HYPERPLASIA WITH URINARY OBSTRUCTION: ICD-10-CM

## 2023-07-19 DIAGNOSIS — N13.8 BENIGN PROSTATIC HYPERPLASIA WITH URINARY OBSTRUCTION: ICD-10-CM

## 2023-07-19 PROCEDURE — 3074F SYST BP LT 130 MM HG: CPT | Performed by: FAMILY MEDICINE

## 2023-07-19 PROCEDURE — 3078F DIAST BP <80 MM HG: CPT | Performed by: FAMILY MEDICINE

## 2023-07-19 PROCEDURE — 99396 PREV VISIT EST AGE 40-64: CPT | Performed by: FAMILY MEDICINE

## 2023-07-19 ASSESSMENT — ENCOUNTER SYMPTOMS
RESPIRATORY NEGATIVE: 1
GASTROINTESTINAL NEGATIVE: 1

## 2023-07-19 NOTE — PROGRESS NOTES
2023    Eric Hallman (:  1960) is a 57 y.o. male, here for a preventive medicine evaluation. Chief Complaint   Patient presents with    Annual Exam     No issues/concerns     Annual eval.    Doing well, no acute concerns. BP Readings from Last 3 Encounters:   23 126/78   23 (!) 132/92   23 118/84     Wt Readings from Last 3 Encounters:   23 251 lb (113.9 kg)   23 252 lb (114.3 kg)   23 252 lb (114.3 kg)         Patient Active Problem List   Diagnosis    HTN (hypertension)    IFG (impaired fasting glucose)    Hyperlipidemia with target LDL less than 100    Elevated PSA    Chronic prostatitis    Benign prostatic hyperplasia with lower urinary tract symptoms    SCOTTIE (acute kidney injury) ([address])    Urinary retention    Enlarged prostate    CKD (chronic kidney disease), stage II    Normocytic anemia    Obesity (BMI 30-39. 9)       Review of Systems   Constitutional: Negative. HENT: Negative. Respiratory: Negative. Cardiovascular: Negative. Gastrointestinal: Negative. Musculoskeletal: Negative. All other systems reviewed and are negative. Prior to Visit Medications    Medication Sig Taking?  Authorizing Provider   atorvastatin (LIPITOR) 20 MG tablet TAKE 1 TABLET BY MOUTH ONE TIME A DAY Yes Yumiko Perez DO   amLODIPine (NORVASC) 5 MG tablet TAKE 1 TABLET BY MOUTH EVERY DAY Yes Yumiko Perez DO   prednisoLONE acetate (PRED FORTE) 1 % ophthalmic suspension INSTILL 1 DROP INTO RIGHT EYE EVERY 2 WEEKS Yes Historical Provider, MD   COMBIGAN 0.2-0.5 % ophthalmic solution Place 1 drop into the right eye 2 times daily Yes Historical Provider, MD   aspirin 81 MG tablet Take 1 tablet by mouth daily Yes Historical Provider, MD   MULTIPLE VITAMIN PO Take by mouth daily  Yes Historical Provider, MD        Allergies   Allergen Reactions    Shellfish-Derived Products      Chest tightens and throat swells    Lisinopril Swelling    Nsaids

## 2023-09-14 ENCOUNTER — PATIENT MESSAGE (OUTPATIENT)
Dept: FAMILY MEDICINE CLINIC | Age: 63
End: 2023-09-14

## 2023-09-15 NOTE — TELEPHONE ENCOUNTER
From: Luis eRyes  To: Dr. Gerline Aschoff  Sent: 9/14/2023 10:32 PM EDT  Subject: Transfer prescription     I like to change from [address] [place] [address],[address] to [place] [address]  [facility]

## 2023-09-19 RX ORDER — SODIUM CHLORIDE 0.9 % (FLUSH) 0.9 %
5-40 SYRINGE (ML) INJECTION PRN
Status: DISCONTINUED | OUTPATIENT
Start: 2023-09-19 | End: 2023-09-25 | Stop reason: HOSPADM

## 2023-09-19 RX ORDER — SODIUM CHLORIDE 450 MG/100ML
INJECTION, SOLUTION INTRAVENOUS CONTINUOUS
Status: DISCONTINUED | OUTPATIENT
Start: 2023-09-19 | End: 2023-09-25 | Stop reason: HOSPADM

## 2023-09-19 RX ORDER — SODIUM CHLORIDE 0.9 % (FLUSH) 0.9 %
5-40 SYRINGE (ML) INJECTION EVERY 12 HOURS SCHEDULED
Status: DISCONTINUED | OUTPATIENT
Start: 2023-09-19 | End: 2023-09-25 | Stop reason: HOSPADM

## 2023-09-19 RX ORDER — SODIUM CHLORIDE 9 MG/ML
25 INJECTION, SOLUTION INTRAVENOUS PRN
Status: DISCONTINUED | OUTPATIENT
Start: 2023-09-19 | End: 2023-09-25 | Stop reason: HOSPADM

## 2023-09-25 ENCOUNTER — ANESTHESIA EVENT (OUTPATIENT)
Dept: ENDOSCOPY | Age: 63
End: 2023-09-25
Payer: COMMERCIAL

## 2023-09-25 ENCOUNTER — HOSPITAL ENCOUNTER (OUTPATIENT)
Age: 63
Setting detail: OUTPATIENT SURGERY
Discharge: HOME | End: 2023-09-25
Attending: INTERNAL MEDICINE | Admitting: INTERNAL MEDICINE
Payer: COMMERCIAL

## 2023-09-25 ENCOUNTER — ANESTHESIA (OUTPATIENT)
Dept: ENDOSCOPY | Age: 63
End: 2023-09-25
Payer: COMMERCIAL

## 2023-09-25 VITALS
HEIGHT: 74 IN | HEART RATE: 70 BPM | DIASTOLIC BLOOD PRESSURE: 73 MMHG | SYSTOLIC BLOOD PRESSURE: 114 MMHG | OXYGEN SATURATION: 96 % | WEIGHT: 257.2 LBS | TEMPERATURE: 96.5 F | BODY MASS INDEX: 33.01 KG/M2 | RESPIRATION RATE: 16 BRPM

## 2023-09-25 PROCEDURE — 88305 TISSUE EXAM BY PATHOLOGIST: CPT

## 2023-09-25 PROCEDURE — C1726 CATH, BAL DIL, NON-VASCULAR: HCPCS | Performed by: INTERNAL MEDICINE

## 2023-09-25 PROCEDURE — 7100000010 HC PHASE II RECOVERY - FIRST 15 MIN: Performed by: INTERNAL MEDICINE

## 2023-09-25 PROCEDURE — 2500000003 HC RX 250 WO HCPCS: Performed by: NURSE ANESTHETIST, CERTIFIED REGISTERED

## 2023-09-25 PROCEDURE — 6360000002 HC RX W HCPCS: Performed by: NURSE ANESTHETIST, CERTIFIED REGISTERED

## 2023-09-25 PROCEDURE — 3609017700 HC EGD DILATION GASTRIC/DUODENAL STRICTURE: Performed by: INTERNAL MEDICINE

## 2023-09-25 PROCEDURE — 3700000001 HC ADD 15 MINUTES (ANESTHESIA): Performed by: INTERNAL MEDICINE

## 2023-09-25 PROCEDURE — 3700000000 HC ANESTHESIA ATTENDED CARE: Performed by: INTERNAL MEDICINE

## 2023-09-25 PROCEDURE — 7100000011 HC PHASE II RECOVERY - ADDTL 15 MIN: Performed by: INTERNAL MEDICINE

## 2023-09-25 PROCEDURE — 3609012400 HC EGD TRANSORAL BIOPSY SINGLE/MULTIPLE: Performed by: INTERNAL MEDICINE

## 2023-09-25 PROCEDURE — 2580000003 HC RX 258: Performed by: INTERNAL MEDICINE

## 2023-09-25 RX ORDER — LIDOCAINE HYDROCHLORIDE 20 MG/ML
INJECTION, SOLUTION EPIDURAL; INFILTRATION; INTRACAUDAL; PERINEURAL PRN
Status: DISCONTINUED | OUTPATIENT
Start: 2023-09-25 | End: 2023-09-25 | Stop reason: SDUPTHER

## 2023-09-25 RX ORDER — AMLODIPINE BESYLATE 5 MG/1
TABLET ORAL
Qty: 90 TABLET | Refills: 3 | Status: SHIPPED | OUTPATIENT
Start: 2023-09-25

## 2023-09-25 RX ORDER — PROPOFOL 10 MG/ML
INJECTION, EMULSION INTRAVENOUS PRN
Status: DISCONTINUED | OUTPATIENT
Start: 2023-09-25 | End: 2023-09-25 | Stop reason: SDUPTHER

## 2023-09-25 RX ADMIN — PROPOFOL 50 MG: 10 INJECTION, EMULSION INTRAVENOUS at 07:35

## 2023-09-25 RX ADMIN — LIDOCAINE HYDROCHLORIDE 100 MG: 20 INJECTION, SOLUTION EPIDURAL; INFILTRATION; INTRACAUDAL; PERINEURAL at 07:28

## 2023-09-25 RX ADMIN — SODIUM CHLORIDE: 4.5 INJECTION, SOLUTION INTRAVENOUS at 07:07

## 2023-09-25 RX ADMIN — PROPOFOL 50 MG: 10 INJECTION, EMULSION INTRAVENOUS at 07:32

## 2023-09-25 RX ADMIN — PROPOFOL 150 MG: 10 INJECTION, EMULSION INTRAVENOUS at 07:28

## 2023-09-25 RX ADMIN — PROPOFOL 50 MG: 10 INJECTION, EMULSION INTRAVENOUS at 07:38

## 2023-09-25 ASSESSMENT — PAIN - FUNCTIONAL ASSESSMENT: PAIN_FUNCTIONAL_ASSESSMENT: NONE - DENIES PAIN

## 2023-09-25 NOTE — OP NOTE
[place] [address]                                OPERATIVE REPORT    PATIENT NAME: Pepe Brooks                    :        1960  MED REC NO:   [id number]                           ROOM:  ACCOUNT NO:   [de-identified]                           ADMIT DATE: 2023  PROVIDER:     Ed Li M.D.    Therese Laughliners OF PROCEDURE:  2023    INDICATION:  The patient with dysphagia. Upper endoscopy at an outside  facility was not completed due to severe strictures. Upper endoscope  could not advance to the stomach. The patient started on Flovent. Plan  today for EGD to evaluate with possible dilation. PROCEDURE PERFORMED:  EGD with balloon dilation up to size 15 mm or 45  [place] and biopsy. SURGEON:  Ed Li MD    ASA CLASSIFICATION:  See Anesthesia note. ESTIMATED BLOOD LOSS:  Minimal.    DESCRIPTION OF PROCEDURE:  The patient brought to the GI lab. Consent  was obtained. Risks involved with the procedure explained to the  patient. Informed consent obtained. The patient was monitored during  the procedure with pulse oximetry, blood pressure monitoring, and oxygen  by nasal cannula. Sedation by incremental doses of IV propofol given by  Anesthesia Service to achieve monitored anesthesia care. For ASA  classification and medication given during the procedure, see Anesthesia  note. Standard video upper scope advanced under direct vision from the oral  cavity up to the duodenum. Esophagus appeared tortuous with GE junction  at 40 cm from incisors. Severe stricture seen. However, the scope at  this time could not advance with slight difficulty to the gastric lumen. Retroflex exam of the cardia revealed a small hiatus hernia. Antrum  showed mild gastritis.   Duodenum appeared normal.  Balloon with maximum  dimension of 15 mm or 45-L[place] positioned in the distal esophagus,  inflated and deflated multiple

## 2023-09-25 NOTE — ANESTHESIA POSTPROCEDURE EVALUATION
Department of Anesthesiology  Postprocedure Note    Patient: Kaitlyn Koch  MRN: [id number]  [facility] 1960  Date of evaluation: 9/25/2023      Procedure Summary     Date: 09/25/23 Room / Location: [address] [place] [address] / [address] [place] [address]    Anesthesia Start: Chrys Opitz Anesthesia Stop: 8938    Procedures:       EGD Balloon Dilation (Esophagus)      EGD BIOPSY (Left: Esophagus) Diagnosis:       Dysphagia, unspecified type      (Dysphagia, unspecified type [R13.10])    Surgeons: Amee Damon MD Responsible Provider: Mainor Grover MD    Anesthesia Type: General, MAC ASA Status: 2          Anesthesia Type: General, MAC    Lexi Phase I: Lexi Score: 10    Lexi Phase II:        Anesthesia Post Evaluation    Patient location during evaluation: bedside  Patient participation: complete - patient participated  Level of consciousness: sleepy but conscious  Airway patency: patent  Nausea & Vomiting: no vomiting and no nausea  Complications: no  Cardiovascular status: blood pressure returned to baseline and hemodynamically stable  Respiratory status: acceptable and room air  Hydration status: stable  Pain management: adequate

## 2023-09-25 NOTE — PROGRESS NOTES
Recovery mode. Denies discomfort, taking fluids. Para Kathy discussed findings and plan of care with patient and . Discharge instructions provided, understanding verbalized.

## 2023-09-25 NOTE — BRIEF OP NOTE
Brief Postoperative Note      Patient: Geovanna Olson  YOB: 1960  MRN: [id number]    Date of Procedure: 9/25/2023    Pre-Op Diagnosis Codes:      * Dysphagia, unspecified type [R13.10]    Post-Op Diagnosis: Severe esophageal strictures dilated in size 50 mm and 45 [place] citizen with balloon dilator bleeding post dilations small hiatus hernia gastritis GERD and Schatzki's ring       Procedure(s):  EGD Balloon Dilation  EGD BIOPSY    Surgeon(s):  Teena Greenberg MD    Assistant:  * No surgical staff found *    Anesthesia: Monitor Anesthesia Care    Estimated Blood Loss (mL): Minimal    Complications: Bleeding    Specimens:   ID Type Source Tests Collected by Time Destination   A : distal esophagus stricture bx Tissue Esophagus SURGICAL PATHOLOGY Teena Greenberg MD 9/25/2023 2271        Implants:  * No implants in log *      Drains:   Urethral Catheter 22 fr (Active)       Findings:  Severe esophageal strictures dilated in size 50 mm and 39 [place] citizen with balloon dilator bleeding post dilations small hiatus hernia gastritis GERD and Schatzki's ring      Electronically signed by Teena Greenberg MD on 9/25/2023 at 7:43 AM

## 2023-09-25 NOTE — H&P
[facility]     Sedation/Analgesia History & Physical    Patient: Vinicio Echevarria: 1960  Med Rec#: [id number] Acc#: [id number]   Provider Performing Procedure: Alex Vela MD  Primary Care Physician: Dina Dumont DO    PRE-PROCEDURE   Full CODE [x]Yes  DNR-CCA/DNR-CC []Yes   Brief History/Pre-Procedure Diagnosis:    1. S/P colonoscopy        2. History of colon polyps        3. Diverticulosis        4. Dysphagia, unspecified type           Plan:  Colonoscopy findings discussed with patient in office. Plan EGD with dilation for dysphagia. Procedure discussed with patient in office. Patient instructions for preop EGD included in AVS. Will wait till after EGD to see if patient needs to be placed on a PPI as he does have a history of kidney issues. Patient will need to hold aspirin for 5 days prior to EGD with dilation. Patient should take blood pressure medications morning of procedure with a sip of water. Patient will follow-up after EGD and dilation for findings, pathology, and plan of care. MEDICAL HISTORY    []Additional information:       has a past medical history of Acute kidney injury ([address]), Elevated PSA, Hyperlipidemia LDL goal < 100, Hypertension, and Obesity (BMI 30-39.9).     SOCIAL HISTORY  Social History       Tobacco History       Smoking Status  Never      Smokeless Tobacco Use  Never              Alcohol History       Alcohol Use Status  Yes Drinks/Week  2 Cans of beer per week Amount  2.0 standard drinks of alcohol/wk Comment  3 beer per week              Drug Use       Drug Use Status  No              Sexual Activity       Sexually Active  Not Asked                    FAMILY HISTORY     Family History   Problem Relation Age of Onset    High Blood Pressure Father     Heart Disease Father     Colon Cancer Neg Hx     Cancer Neg Hx     Stroke Neg Hx        SURGICAL HISTORY   has a past surgical history that includes Achilles tendon surgery;

## 2023-09-25 NOTE — PROGRESS NOTES
Scope # . EGD completed, tolerated well. Dilated with 12 mm -15 mm balloon dilators. Biopsies taken. Photos taken.

## 2023-11-01 ENCOUNTER — TELEPHONE (OUTPATIENT)
Dept: UROLOGY | Age: 63
End: 2023-11-01

## 2023-11-01 ENCOUNTER — HOSPITAL ENCOUNTER (EMERGENCY)
Age: 63
Discharge: HOME | End: 2023-11-01
Payer: COMMERCIAL

## 2023-11-01 ENCOUNTER — PATIENT MESSAGE (OUTPATIENT)
Dept: UROLOGY | Age: 63
End: 2023-11-01

## 2023-11-01 VITALS
BODY MASS INDEX: 32.73 KG/M2 | WEIGHT: 255 LBS | HEIGHT: 74 IN | SYSTOLIC BLOOD PRESSURE: 155 MMHG | OXYGEN SATURATION: 100 % | RESPIRATION RATE: 16 BRPM | DIASTOLIC BLOOD PRESSURE: 100 MMHG | HEART RATE: 76 BPM | TEMPERATURE: 98 F

## 2023-11-01 DIAGNOSIS — N30.01 ACUTE CYSTITIS WITH HEMATURIA: Primary | ICD-10-CM

## 2023-11-01 DIAGNOSIS — N41.0 ACUTE PROSTATITIS: ICD-10-CM

## 2023-11-01 LAB
BILIRUB UR STRIP.AUTO-MCNC: ABNORMAL MG/DL
CHARACTER UR: CLEAR
COLOR: ABNORMAL
GLUCOSE UR QL STRIP.AUTO: NEGATIVE MG/DL
KETONES UR QL STRIP.AUTO: NEGATIVE
NITRITE UR QL STRIP.AUTO: NEGATIVE
PH UR STRIP.AUTO: 6 [PH] (ref 5–9)
PROT UR STRIP.AUTO-MCNC: >= 300 MG/DL
RBC #/AREA URNS HPF: ABNORMAL /[HPF]
SP GR UR STRIP.AUTO: 1.02 (ref 1–1.03)
UROBILINOGEN, URINE: 1 EU/DL (ref 0.2–1)
WBC #/AREA URNS HPF: ABNORMAL /[HPF]

## 2023-11-01 PROCEDURE — 99213 OFFICE O/P EST LOW 20 MIN: CPT | Performed by: NURSE PRACTITIONER

## 2023-11-01 PROCEDURE — 87186 SC STD MICRODIL/AGAR DIL: CPT

## 2023-11-01 PROCEDURE — 99213 OFFICE O/P EST LOW 20 MIN: CPT

## 2023-11-01 PROCEDURE — 87077 CULTURE AEROBIC IDENTIFY: CPT

## 2023-11-01 PROCEDURE — 87086 URINE CULTURE/COLONY COUNT: CPT

## 2023-11-01 PROCEDURE — 81003 URINALYSIS AUTO W/O SCOPE: CPT

## 2023-11-01 RX ORDER — TAMSULOSIN HYDROCHLORIDE 0.4 MG/1
0.4 CAPSULE ORAL DAILY
Qty: 7 CAPSULE | Refills: 0 | Status: SHIPPED | OUTPATIENT
Start: 2023-11-01

## 2023-11-01 RX ORDER — PHENAZOPYRIDINE HYDROCHLORIDE 100 MG/1
100 TABLET, FILM COATED ORAL 3 TIMES DAILY PRN
Qty: 12 TABLET | Refills: 0 | Status: SHIPPED | OUTPATIENT
Start: 2023-11-01 | End: 2023-11-04

## 2023-11-01 RX ORDER — CEPHALEXIN 500 MG/1
500 CAPSULE ORAL 2 TIMES DAILY
Qty: 14 CAPSULE | Refills: 0 | Status: SHIPPED | OUTPATIENT
Start: 2023-11-01 | End: 2023-11-08

## 2023-11-01 ASSESSMENT — PAIN SCALES - GENERAL: PAINLEVEL_OUTOF10: 1

## 2023-11-01 ASSESSMENT — ENCOUNTER SYMPTOMS
RHINORRHEA: 0
NAUSEA: 0
COUGH: 0
SHORTNESS OF BREATH: 0
DIARRHEA: 0
SORE THROAT: 0
CHEST TIGHTNESS: 0
VOMITING: 0

## 2023-11-01 ASSESSMENT — PAIN - FUNCTIONAL ASSESSMENT
PAIN_FUNCTIONAL_ASSESSMENT: ACTIVITIES ARE NOT PREVENTED
PAIN_FUNCTIONAL_ASSESSMENT: 0-10

## 2023-11-01 ASSESSMENT — PAIN DESCRIPTION - PAIN TYPE: TYPE: ACUTE PAIN

## 2023-11-01 ASSESSMENT — PAIN DESCRIPTION - FREQUENCY: FREQUENCY: INTERMITTENT

## 2023-11-01 ASSESSMENT — PAIN DESCRIPTION - LOCATION: LOCATION: OTHER (COMMENT)

## 2023-11-01 ASSESSMENT — PAIN DESCRIPTION - ONSET: ONSET: GRADUAL

## 2023-11-01 ASSESSMENT — PAIN DESCRIPTION - DESCRIPTORS: DESCRIPTORS: BURNING

## 2023-11-01 NOTE — TELEPHONE ENCOUNTER
----- Message from Bonita Schaefer sent at 11/1/2023 10:17 AM EDT -----  Regarding: Urinary issues   Contact: [phone]  I went to urgent care and they are putting me on antibiotics and putting on flomax . You took me off flomax . Can you look over my test results and determine if I need to make an appointment with you .

## 2023-11-01 NOTE — TELEPHONE ENCOUNTER
Per Dr. Evelina Cruz last note. Finasteride was stopped. Okay to stop Flomax. Monitor for symptoms of urinary retention. Complete antibiotic as prescribed.

## 2023-11-01 NOTE — TELEPHONE ENCOUNTER
----- Message from Giselle Stanton sent at 11/1/2023 10:17 AM EDT -----  Regarding: Urinary issues   Contact: [phone]  I went to urgent care and they are putting me on antibiotics and putting on flomax . You took me off flomax . Can you look over my test results and determine if I need to make an appointment with you .

## 2023-11-02 ENCOUNTER — TELEPHONE (OUTPATIENT)
Dept: FAMILY MEDICINE CLINIC | Age: 63
End: 2023-11-02

## 2023-11-02 LAB
BACTERIA UR CULT: ABNORMAL
ORGANISM: ABNORMAL

## 2023-11-06 RX ORDER — TAMSULOSIN HYDROCHLORIDE 0.4 MG/1
0.4 CAPSULE ORAL DAILY
Qty: 7 CAPSULE | Refills: 0 | OUTPATIENT
Start: 2023-11-06

## 2023-11-17 ENCOUNTER — OFFICE VISIT (OUTPATIENT)
Dept: UROLOGY | Age: 63
End: 2023-11-17
Payer: COMMERCIAL

## 2023-11-17 VITALS — WEIGHT: 255 LBS | RESPIRATION RATE: 16 BRPM | BODY MASS INDEX: 32.73 KG/M2 | HEIGHT: 74 IN

## 2023-11-17 DIAGNOSIS — R30.0 DYSURIA: ICD-10-CM

## 2023-11-17 DIAGNOSIS — N30.00 ACUTE CYSTITIS WITHOUT HEMATURIA: ICD-10-CM

## 2023-11-17 DIAGNOSIS — N41.1 CHRONIC PROSTATITIS: ICD-10-CM

## 2023-11-17 DIAGNOSIS — R97.20 ELEVATED PSA: ICD-10-CM

## 2023-11-17 DIAGNOSIS — N40.1 BENIGN PROSTATIC HYPERPLASIA WITH LOWER URINARY TRACT SYMPTOMS, SYMPTOM DETAILS UNSPECIFIED: Primary | ICD-10-CM

## 2023-11-17 LAB
BILIRUBIN URINE: NEGATIVE
BLOOD URINE, POC: ABNORMAL
CHARACTER, URINE: ABNORMAL
COLOR, URINE: ABNORMAL
GLUCOSE URINE: NEGATIVE MG/DL
KETONES, URINE: NEGATIVE
LEUKOCYTE CLUMPS, URINE: ABNORMAL
NITRITE, URINE: NEGATIVE
PH, URINE: 7.5 (ref 5–9)
POST VOID RESIDUAL (PVR): 210 ML
PROTEIN, URINE: 30 MG/DL
SPECIFIC GRAVITY, URINE: 1.02 (ref 1–1.03)
UROBILINOGEN, URINE: 0.2 EU/DL (ref 0–1)

## 2023-11-17 PROCEDURE — G8417 CALC BMI ABV UP PARAM F/U: HCPCS

## 2023-11-17 PROCEDURE — G8428 CUR MEDS NOT DOCUMENT: HCPCS

## 2023-11-17 PROCEDURE — G8484 FLU IMMUNIZE NO ADMIN: HCPCS

## 2023-11-17 PROCEDURE — 3017F COLORECTAL CA SCREEN DOC REV: CPT

## 2023-11-17 PROCEDURE — 1036F TOBACCO NON-USER: CPT

## 2023-11-17 PROCEDURE — 51798 US URINE CAPACITY MEASURE: CPT

## 2023-11-17 PROCEDURE — 81003 URINALYSIS AUTO W/O SCOPE: CPT

## 2023-11-17 PROCEDURE — 99214 OFFICE O/P EST MOD 30 MIN: CPT

## 2023-11-17 RX ORDER — CIPROFLOXACIN 500 MG/1
500 TABLET, FILM COATED ORAL 2 TIMES DAILY
Qty: 20 TABLET | Refills: 0 | Status: SHIPPED | OUTPATIENT
Start: 2023-11-17 | End: 2023-11-27

## 2023-11-17 NOTE — PROGRESS NOTES
[address] [facility].  [address] [facility],[address] [address]  Dept: [phone]  Loc: [phone]  Visit Date: 11/17/2023    GAYATHRI Lindsey is a 57 y.o. male that presents to the urology clinic for evaluation of dysuria. Patient with recent onset of UTI. Presented to  on 11/1/23 with complaints of burning with urination. Prescribed and completed Keflex which helped for a time, however- symptoms returned in short order. Moderate blood, large leukocytes on UA today. URO History  BPH with outlet obstruction and elevated PSA. Also with history of chronic prostatitis. Negative biopsy in 2020 and PVP in late 2020 after biopsy. Having mild UUI but this is better. Cystoscopy has shown mild regrowth at the apex for which Bella Toure now takes Finasteride. Stable LUTS following Greenlight PVP. IPSS of 4/0 today. Most Recent PSA: 5.19   (4/27/23) Checking annually. PVR: 210 mL  UA: Moderate Blood and Large Leukocytes. Lab Results   Component Value Date/Time    COLORU Light yellow 11/17/2023 09:44 AM    COLORU Brown 11/01/2023 09:17 AM    LABSPEC 1.020 11/17/2023 09:44 AM    LABPH 7.50 11/17/2023 09:44 AM    NITRU Negative 11/17/2023 09:44 AM    GLUCOSEU Negative 11/17/2023 09:44 AM    KETUA Negative 11/17/2023 09:44 AM    UROBILINOGEN 0.20 11/17/2023 09:44 AM    BILIRUBINUR Negative 11/17/2023 09:44 AM          Last BUN and creatinine:  Lab Results   Component Value Date    BUN 14 07/14/2023     Lab Results   Component Value Date    CREATININE 1.1 07/14/2023           PAST MEDICAL, FAMILY AND SOCIAL HISTORY UPDATE:  Past Medical History:   Diagnosis Date    Acute kidney injury ([address]) 09/25/2020    Acute prostatitis     Elevated PSA     Hyperlipidemia LDL goal < 100 02/26/2013    Hypertension     Obesity (BMI 30-39. 9)      Past Surgical History:   Procedure Laterality Date    ACHILLES TENDON SURGERY      COLONOSCOPY      2023-sean    CORNEAL TRANSPLANT

## 2023-11-19 LAB
BACTERIA UR CULT: ABNORMAL
ORGANISM: ABNORMAL

## 2024-05-07 ENCOUNTER — OFFICE VISIT (OUTPATIENT)
Dept: UROLOGY | Age: 64
End: 2024-05-07
Payer: MEDICARE

## 2024-05-07 VITALS — HEIGHT: 74 IN | WEIGHT: 266 LBS | BODY MASS INDEX: 34.14 KG/M2 | RESPIRATION RATE: 18 BRPM

## 2024-05-07 DIAGNOSIS — R97.20 ELEVATED PSA: ICD-10-CM

## 2024-05-07 DIAGNOSIS — N41.1 CHRONIC PROSTATITIS: ICD-10-CM

## 2024-05-07 DIAGNOSIS — N40.1 BENIGN PROSTATIC HYPERPLASIA WITH LOWER URINARY TRACT SYMPTOMS, SYMPTOM DETAILS UNSPECIFIED: Primary | ICD-10-CM

## 2024-05-07 LAB
BILIRUBIN, URINE: NEGATIVE
BLOOD URINE, POC: NORMAL
CHARACTER, URINE: CLEAR
COLOR: YELLOW
GLUCOSE URINE: NEGATIVE MG/DL
KETONES, URINE: NEGATIVE
LEUKOCYTE CLUMPS, URINE: NEGATIVE
NITRITE, URINE: NEGATIVE
PH, URINE: 7 (ref 5–9)
PROTEIN, URINE: NEGATIVE MG/DL
SPECIFIC GRAVITY UA: 1.02 (ref 1–1.03)
UROBILINOGEN, URINE: 0.2 EU/DL (ref 0–1)

## 2024-05-07 PROCEDURE — 1123F ACP DISCUSS/DSCN MKR DOCD: CPT

## 2024-05-07 PROCEDURE — 99214 OFFICE O/P EST MOD 30 MIN: CPT

## 2024-05-07 PROCEDURE — 81003 URINALYSIS AUTO W/O SCOPE: CPT

## 2024-05-07 NOTE — PROGRESS NOTES
[facility] [place] [facility]  [facility] UROLOG[facility]  [address]  [address]  [facility] [address]  Dept: [phone]  Loc: [phone]  Visit Date: 5/7/2024    HPI  Vincent Lezama is a 63 y.o. male that presents to the urology clinic for BPH and elevated PSA follow-up.    BPH with outlet obstruction and elevated PSA. Also with history of chronic prostatitis. Negative biopsy in 2020 and PVP in late 2020 after biopsy. Having mild UUI but this is improving following Greenlight PVP with improved urinary stream and emptying.    IPSS of 4/0 today.    PSA Trend  7.33    (05/03/24)  5.19    (04/27/23)  4.18    (05/02/22)  6.04    (07/07/21)  5.62    (04/26/21)  8.32    (01/04/21)  13.93  (10/10/20)    UA:  Results for POC orders placed in visit on 05/07/24   POCT Urinalysis No Micro (Auto)   Result Value Ref Range    Glucose, Ur Negative NEGATIVE mg/dl    Bilirubin, Urine Negative     Ketones, Urine Negative NEGATIVE    Specific Gravity, UA 1.020 1.002 - 1.030    Blood, UA POC Trace-intact NEGATIVE    pH, Urine 7.00 5.0 - 9.0    Protein, Urine Negative NEGATIVE mg/dl    Urobilinogen, Urine 0.20 0.0 - 1.0 eu/dl    Nitrite, Urine Negative NEGATIVE    Leukocyte Clumps, Urine Negative NEGATIVE    Color, UA Yellow YELLOW-STRAW    Character, Urine Clear [id number].CLOUD            Last BUN and creatinine:  Lab Results   Component Value Date    BUN 14 07/14/2023     Lab Results   Component Value Date    CREATININE 1.1 07/14/2023           PAST MEDICAL, FAMILY AND SOCIAL HISTORY UPDATE:  Past Medical History:   Diagnosis Date    Acute kidney injury (HCC) 09/25/2020    Acute prostatitis     Elevated PSA     Hyperlipidemia LDL goal < 100 02/26/2013    Hypertension     Obesity (BMI 30-39.9)      Past Surgical History:   Procedure Laterality Date    ACHILLES TENDON SURGERY      COLONOSCOPY      2023-sean    CORNEAL TRANSPLANT  07/2013    CYSTOSCOPY N/A 10/28/2020    CYSTOSCOPY,GREENLIGHT PHOTOVAPORIZATION OF

## 2024-05-08 ENCOUNTER — NURSE ONLY (OUTPATIENT)
Dept: UROLOGY | Age: 64
End: 2024-05-08

## 2024-05-08 DIAGNOSIS — R97.20 ELEVATED PSA: Primary | ICD-10-CM

## 2024-05-08 PROCEDURE — 90000 NO LOS: CPT | Performed by: NURSE PRACTITIONER

## 2024-05-08 NOTE — PROGRESS NOTES
Patient here to give urine for Exo Dx testing. Urine collected order form filled out and sent via [employer]. See attached order.

## 2024-06-12 ENCOUNTER — TELEPHONE (OUTPATIENT)
Dept: UROLOGY | Age: 64
End: 2024-06-12

## 2024-06-12 ENCOUNTER — OFFICE VISIT (OUTPATIENT)
Dept: UROLOGY | Age: 64
End: 2024-06-12
Payer: MEDICARE

## 2024-06-12 VITALS — BODY MASS INDEX: 34.14 KG/M2 | WEIGHT: 266 LBS | RESPIRATION RATE: 18 BRPM | HEIGHT: 74 IN

## 2024-06-12 DIAGNOSIS — N40.1 BENIGN PROSTATIC HYPERPLASIA WITH LOWER URINARY TRACT SYMPTOMS, SYMPTOM DETAILS UNSPECIFIED: ICD-10-CM

## 2024-06-12 DIAGNOSIS — N41.1 CHRONIC PROSTATITIS: ICD-10-CM

## 2024-06-12 DIAGNOSIS — R97.20 ELEVATED PSA: Primary | ICD-10-CM

## 2024-06-12 PROCEDURE — 99213 OFFICE O/P EST LOW 20 MIN: CPT

## 2024-06-12 PROCEDURE — 1123F ACP DISCUSS/DSCN MKR DOCD: CPT

## 2024-06-12 NOTE — PROGRESS NOTES
[facility] [place] [facility]  [facility] UROLOGY  [address]  [address]  [facility] [address]  Dept: [phone]  Loc: [phone]  Visit Date: 6/12/2024    HPI  Vincent Lezama is a 63 y.o. male that presents to the urology clinic for BPH and elevated PSA follow-up.    BPH with outlet obstruction and elevated PSA. Also with history of chronic prostatitis. Negative biopsy in 2020 and PVP in late 2020 following negative Bx results.     Patient does complain of experience mild UUI in the past which did improve following Greenlight PVP with improved urinary stream and emptying.    IPSS of 4/0 today.    PSA rising once more, new high of 7.33 on most recent check (05/03/24). ExoDx score mildly elevated as shown below.        PSA Trend  7.33    (05/03/24)  5.19    (04/27/23)  4.18    (05/02/22)  6.04    (07/07/21)  5.62    (04/26/21)  8.32    (01/04/21)  13.93  (10/10/20)          Last BUN and creatinine:  Lab Results   Component Value Date    BUN 14 07/14/2023     Lab Results   Component Value Date    CREATININE 1.1 07/14/2023           PAST MEDICAL, FAMILY AND SOCIAL HISTORY UPDATE:  Past Medical History:   Diagnosis Date    Acute kidney injury (HCC) 09/25/2020    Acute prostatitis     Elevated PSA     Hyperlipidemia LDL goal < 100 02/26/2013    Hypertension     Obesity (BMI 30-39.9)      Past Surgical History:   Procedure Laterality Date    ACHILLES TENDON SURGERY      COLONOSCOPY      2023-sean    CORNEAL TRANSPLANT  07/2013    CYSTOSCOPY N/A 10/28/2020    CYSTOSCOPY,GREENLIGHT PHOTOVAPORIZATION OF PROSTATE performed by Meliton Rodriguez MD at [facility] OR    EYE SURGERY  2014    \"re-work on corneal trasplant\"    NV EGD FLEXIBLE FOREIGN BODY REMOVAL Left 01/18/2018    EGD FOREIGN BODY REMOVAL performed by Kenton Yu MD at [facility] Endoscopy    PROSTATE SURGERY  02/04/2014    bx- negative    ULTRASOUND PROSTATE/TRANSRECTAL N/A 10/14/2020    CYSTOSCOY, TRANSRECTAL ULTRASOUND GUIDED PROSTATE BIOPSY

## 2024-06-12 NOTE — TELEPHONE ENCOUNTER
Patient scheduled for MRI PROSTATE W WO  at [facility] MR on 7/15/2024.  Arrival of 1730 for a 1600 scan time.  Order with instructions given to the patient in the office

## 2024-06-24 ENCOUNTER — PATIENT MESSAGE (OUTPATIENT)
Dept: FAMILY MEDICINE CLINIC | Age: 64
End: 2024-06-24

## 2024-06-24 DIAGNOSIS — R73.01 IFG (IMPAIRED FASTING GLUCOSE): ICD-10-CM

## 2024-06-24 DIAGNOSIS — I10 ESSENTIAL HYPERTENSION: Primary | ICD-10-CM

## 2024-06-24 DIAGNOSIS — D64.9 NORMOCYTIC ANEMIA: ICD-10-CM

## 2024-06-24 DIAGNOSIS — E78.5 HYPERLIPIDEMIA WITH TARGET LDL LESS THAN 100: ICD-10-CM

## 2024-07-15 ENCOUNTER — HOSPITAL ENCOUNTER (OUTPATIENT)
Dept: MRI IMAGING | Age: 64
Discharge: HOME | End: 2024-07-15
Payer: MEDICARE

## 2024-07-15 ENCOUNTER — LAB (OUTPATIENT)
Dept: LAB | Age: 64
End: 2024-07-15

## 2024-07-15 DIAGNOSIS — R97.20 ELEVATED PSA: ICD-10-CM

## 2024-07-15 LAB — POC CREATININE WHOLE BLOOD: 1.3 MG/DL (ref 0.5–1.2)

## 2024-07-15 PROCEDURE — 72197 MRI PELVIS W/O & W/DYE: CPT

## 2024-07-15 PROCEDURE — 6360000004 HC RX CONTRAST MEDICATION

## 2024-07-15 PROCEDURE — A9579 GAD-BASE MR CONTRAST NOS,1ML: HCPCS

## 2024-07-15 PROCEDURE — 82565 ASSAY OF CREATININE: CPT

## 2024-07-15 RX ADMIN — GADOTERIDOL 20 ML: 279.3 INJECTION, SOLUTION INTRAVENOUS at 18:22

## 2024-07-16 ENCOUNTER — LAB (OUTPATIENT)
Dept: LAB | Age: 64
End: 2024-07-16

## 2024-07-16 DIAGNOSIS — I10 ESSENTIAL HYPERTENSION: ICD-10-CM

## 2024-07-16 DIAGNOSIS — E78.5 HYPERLIPIDEMIA WITH TARGET LDL LESS THAN 100: ICD-10-CM

## 2024-07-16 DIAGNOSIS — D64.9 NORMOCYTIC ANEMIA: ICD-10-CM

## 2024-07-16 DIAGNOSIS — R73.01 IFG (IMPAIRED FASTING GLUCOSE): ICD-10-CM

## 2024-07-16 LAB
ALBUMIN SERPL BCG-MCNC: 4.4 G/DL (ref 3.5–5.1)
ALP SERPL-CCNC: 95 U/L (ref 38–126)
ALT SERPL W/O P-5'-P-CCNC: 24 U/L (ref 11–66)
ANION GAP SERPL CALC-SCNC: 11 MEQ/L (ref 8–16)
AST SERPL-CCNC: 25 U/L (ref 5–40)
BASOPHILS ABSOLUTE: 0 THOU/MM3 (ref 0–0.1)
BASOPHILS NFR BLD AUTO: 0.6 %
BILIRUB SERPL-MCNC: 0.7 MG/DL (ref 0.3–1.2)
BUN SERPL-MCNC: 12 MG/DL (ref 7–22)
CALCIUM SERPL-MCNC: 9.1 MG/DL (ref 8.5–10.5)
CHLORIDE SERPL-SCNC: 103 MEQ/L (ref 98–111)
CHOLEST SERPL-MCNC: 163 MG/DL (ref 100–199)
CO2 SERPL-SCNC: 23 MEQ/L (ref 23–33)
CREAT SERPL-MCNC: 1.2 MG/DL (ref 0.4–1.2)
DEPRECATED MEAN GLUCOSE BLD GHB EST-ACNC: 120 MG/DL (ref 70–126)
DEPRECATED RDW RBC AUTO: 48.2 FL (ref 35–45)
EOSINOPHIL NFR BLD AUTO: 1.9 %
EOSINOPHILS ABSOLUTE: 0.1 THOU/MM3 (ref 0–0.4)
ERYTHROCYTE [DISTWIDTH] IN BLOOD BY AUTOMATED COUNT: 13.6 % (ref 11.5–14.5)
GFR SERPL CREATININE-BSD FRML MDRD: 67 ML/MIN/1.73M2
GLUCOSE SERPL-MCNC: 103 MG/DL (ref 70–108)
HBA1C MFR BLD HPLC: 6 % (ref 4.4–6.4)
HCT VFR BLD AUTO: 48.7 % (ref 42–52)
HDLC SERPL-MCNC: 53 MG/DL
HGB BLD-MCNC: 15.8 GM/DL (ref 14–18)
IMM GRANULOCYTES # BLD AUTO: 0.01 THOU/MM3 (ref 0–0.07)
IMM GRANULOCYTES NFR BLD AUTO: 0.2 %
LDLC SERPL CALC-MCNC: 89 MG/DL
LYMPHOCYTES ABSOLUTE: 1.7 THOU/MM3 (ref 1–4.8)
LYMPHOCYTES NFR BLD AUTO: 26.9 %
MCH RBC QN AUTO: 30.9 PG (ref 26–33)
MCHC RBC AUTO-ENTMCNC: 32.4 GM/DL (ref 32.2–35.5)
MCV RBC AUTO: 95.3 FL (ref 80–94)
MONOCYTES ABSOLUTE: 0.4 THOU/MM3 (ref 0.4–1.3)
MONOCYTES NFR BLD AUTO: 6.4 %
NEUTROPHILS ABSOLUTE: 4 THOU/MM3 (ref 1.8–7.7)
NEUTROPHILS NFR BLD AUTO: 64 %
NRBC BLD AUTO-RTO: 0 /100 WBC
PLATELET # BLD AUTO: 192 THOU/MM3 (ref 130–400)
PMV BLD AUTO: 10.6 FL (ref 9.4–12.4)
POTASSIUM SERPL-SCNC: 4.1 MEQ/L (ref 3.5–5.2)
PROT SERPL-MCNC: 7 G/DL (ref 6.1–8)
RBC # BLD AUTO: 5.11 MILL/MM3 (ref 4.7–6.1)
SODIUM SERPL-SCNC: 137 MEQ/L (ref 135–145)
TRIGL SERPL-MCNC: 103 MG/DL (ref 0–199)
TSH SERPL DL<=0.005 MIU/L-ACNC: 0.47 UIU/ML (ref 0.4–4.2)
WBC # BLD AUTO: 6.2 THOU/MM3 (ref 4.8–10.8)

## 2024-07-17 ENCOUNTER — OFFICE VISIT (OUTPATIENT)
Dept: UROLOGY | Age: 64
End: 2024-07-17
Payer: MEDICARE

## 2024-07-17 ENCOUNTER — TELEPHONE (OUTPATIENT)
Dept: UROLOGY | Age: 64
End: 2024-07-17

## 2024-07-17 VITALS — RESPIRATION RATE: 18 BRPM | BODY MASS INDEX: 33.37 KG/M2 | WEIGHT: 260 LBS | HEIGHT: 74 IN

## 2024-07-17 DIAGNOSIS — R97.20 ELEVATED PSA: Primary | ICD-10-CM

## 2024-07-17 DIAGNOSIS — N40.1 BENIGN PROSTATIC HYPERPLASIA WITH LOWER URINARY TRACT SYMPTOMS, SYMPTOM DETAILS UNSPECIFIED: ICD-10-CM

## 2024-07-17 DIAGNOSIS — N41.1 CHRONIC PROSTATITIS: ICD-10-CM

## 2024-07-17 PROCEDURE — 1123F ACP DISCUSS/DSCN MKR DOCD: CPT

## 2024-07-17 PROCEDURE — 99213 OFFICE O/P EST LOW 20 MIN: CPT

## 2024-07-17 RX ORDER — CIPROFLOXACIN 500 MG/1
500 TABLET, FILM COATED ORAL 2 TIMES DAILY
Qty: 6 TABLET | Refills: 0 | Status: SHIPPED | OUTPATIENT
Start: 2024-07-17 | End: 2024-07-20

## 2024-07-17 NOTE — PROGRESS NOTES
[facility] [place] [facility]  [facility] UROLOGY  [address]  [address]  [facility] [address]  Dept: [phone]  Loc: [phone]  Visit Date: 7/17/2024    [facility] [place]  Vincent Lezama is a 63 y.o. male that presents to the urology clinic for BPH and elevated PSA follow-up.    BPH with outlet obstruction and elevated PSA. Also with history of chronic prostatitis. Negative biopsy in 2020 and PVP in late 2020 following negative Bx results.     PSA rising once more, new high of 7.33 on most recent check (05/03/24). ExoDx score mildly elevated as shown below.    Patient does complain of experience mild UUI in the past which did improve following Greenlight PVP with improved urinary stream and emptying.    IPSS of 4/0 today.          PSA Trend  7.33    (05/03/24)  5.19    (04/27/23)  4.18    (05/02/22)  6.04    (07/07/21)  5.62    (04/26/21)  8.32    (01/04/21)  13.93  (10/10/20)    MRI Prostate W WO Contrast (07/16/24)    FINDINGS:   PROSTATE SIZE:  1. The prostate gland is  enlarged measuring 7.6 x 6.9 x 6.4 cm in size.  2. The prostate volume is 155.57 ml.     TRANSITIONAL ZONE:  1. Changes of benign prostatic hyperplasia throughout the transitional zone.  2. 2.8 x 2.6 cm nodule along the base of the prostate gland. PI-RADS 3..  3. 2.8 x 2.5 cm area of abnormal signal intensity in the transitional zone on  the left side. PI-RADS 3.   4. 2.2 x 1.3 cm area of abnormal signal intensity in the transitional zone on  the right side. PI-RADS 3.     CENTRAL ZONE:  1. Unremarkable.     PERIPHERAL ZONE:  1. Unremarkable.     PROSTATE CAPSULE/NV BUNDLE/SEMINAL VESICLES: Area of abnormal signal intensity  in the seminal vesicle in the midline.     URINARY BLADDER/RECTUM/PELVIC DIAPHRAGM: Thick-walled bladder..     PATHOLOGICALLY ENLARGED LYMPH NODES:  1. None.     OSSEOUS STRUCTURES:  1. 10 mm area of abnormal signal intensity and enhancement in the right  acetabulum, unchanged since previous study dated 15

## 2024-07-17 NOTE — TELEPHONE ENCOUNTER
PROSTATE ULTRASOUND/BIOPSY WITH DR SURINDER Lezama      1960    You are scheduled for the above procedure on:    8/7/2024   at:    1:30PM  Your follow up appointment for your biopsy results is on:    8/21/2024     At:   3:00PM    Please note that you will be responsible for any charges that are not paid by your insurance.  The prostate biopsy specimens are sent to a Lab and their charges are billed to you separate from the office charges.      DESCRIPTION OF PROSTATIC ULTRASOUND AND BIOPSY  Ultrasound uses harmless sound waves to give us pictures of the prostate, and allows us to accurately guide a biopsy needle to areas of concern.  The procedure is done in the office.  Initially, a complete prostate exam is done.  Next the ultrasound probe, finger-like in size and shape, is placed into the rectum.  With slight movement of the probe, many different views are obtained.  A prostate block may or may not be given at this time.  A spring loaded fine needle is place through the probe and directed into the prostate.  Six or more biopsies are usually taken.  These biopsies are not usually painful.  The entire exam takes 20-30 minutes.    POSSIBLE RISKS OF THE PROCEDURE  Blood may be noted in the stool and urine for a few days.  Blood may be seen in the semen for up to a month.  Infection of the prostate or in the urine can occur even with antibiotic preparation.  You should call if you develop fever, chills, severe pain, or have continuous or significant bleeding.    If you have known hemorrhoids, you may have more bleeding and discomfort in the rectal area following the biopsy.  This may last for 3-5 days afterwards.  If any concerns arise, please call the office.    PREPARATION** PLEASE EAT A REGULAR LUNCH OR BREAKFAST**    1.  DO NOT TAKE: ASPIRIN, MOTRIN,  IBUPROFEN, MOBIC/ MELOXICAM, COUMADIN( WARFARIN) FISH OIL, GARLIC AND VITAMIN E FOR 5 DAYS BEFORE THE BIOPSY AND 3 DAYS AFTER THE BIOPSY.  YOU MAY TAKE

## 2024-07-19 ASSESSMENT — PATIENT HEALTH QUESTIONNAIRE - PHQ9
SUM OF ALL RESPONSES TO PHQ9 QUESTIONS 1 & 2: 0
2. FEELING DOWN, DEPRESSED OR HOPELESS: NOT AT ALL
1. LITTLE INTEREST OR PLEASURE IN DOING THINGS: NOT AT ALL

## 2024-07-22 ENCOUNTER — OFFICE VISIT (OUTPATIENT)
Dept: FAMILY MEDICINE CLINIC | Age: 64
End: 2024-07-22
Payer: MEDICARE

## 2024-07-22 VITALS
HEART RATE: 76 BPM | RESPIRATION RATE: 18 BRPM | HEIGHT: 74 IN | WEIGHT: 260.7 LBS | BODY MASS INDEX: 33.46 KG/M2 | DIASTOLIC BLOOD PRESSURE: 76 MMHG | SYSTOLIC BLOOD PRESSURE: 130 MMHG

## 2024-07-22 DIAGNOSIS — N13.8 BENIGN PROSTATIC HYPERPLASIA WITH URINARY OBSTRUCTION: Primary | ICD-10-CM

## 2024-07-22 DIAGNOSIS — N40.1 BENIGN PROSTATIC HYPERPLASIA WITH URINARY OBSTRUCTION: Primary | ICD-10-CM

## 2024-07-22 DIAGNOSIS — E66.9 OBESITY (BMI 35.0-39.9 WITHOUT COMORBIDITY): ICD-10-CM

## 2024-07-22 DIAGNOSIS — I10 ESSENTIAL HYPERTENSION: ICD-10-CM

## 2024-07-22 DIAGNOSIS — E78.5 HYPERLIPIDEMIA WITH TARGET LDL LESS THAN 100: ICD-10-CM

## 2024-07-22 DIAGNOSIS — R73.01 IFG (IMPAIRED FASTING GLUCOSE): ICD-10-CM

## 2024-07-22 PROCEDURE — 3075F SYST BP GE 130 - 139MM HG: CPT | Performed by: FAMILY MEDICINE

## 2024-07-22 PROCEDURE — G2211 COMPLEX E/M VISIT ADD ON: HCPCS | Performed by: FAMILY MEDICINE

## 2024-07-22 PROCEDURE — 1123F ACP DISCUSS/DSCN MKR DOCD: CPT | Performed by: FAMILY MEDICINE

## 2024-07-22 PROCEDURE — 99214 OFFICE O/P EST MOD 30 MIN: CPT | Performed by: FAMILY MEDICINE

## 2024-07-22 PROCEDURE — 3078F DIAST BP <80 MM HG: CPT | Performed by: FAMILY MEDICINE

## 2024-07-22 NOTE — PROGRESS NOTES
2024    Vincent Lezama (:  1960) is a 63 y.o. male, here for a preventive medicine evaluation.  Chief Complaint   Patient presents with    Annual Exam     Annual eval.    No acute concerns.      BP Readings from Last 3 Encounters:   24 130/76   23 (!) 155/100   10/02/23 130/82     Wt Readings from Last 3 Encounters:   24 118.3 kg (260 lb 11.2 oz)   24 117.9 kg (260 lb)   24 120.7 kg (266 lb)         Subjective   Patient Active Problem List   Diagnosis    HTN (hypertension)    IFG (impaired fasting glucose)    Hyperlipidemia with target LDL less than 100    Elevated PSA    Chronic prostatitis    Benign prostatic hyperplasia with lower urinary tract symptoms    SCOTTIE (acute kidney injury) (HCC)    Urinary retention    Enlarged prostate    CKD (chronic kidney disease), stage II    Normocytic anemia    Obesity (BMI 30-39.9)       Review of Systems    Prior to Visit Medications    Medication Sig Taking? Authorizing Provider   fluticasone (FLOVENT HFA) 220 MCG/ACT inhaler Instill 1 puff into mouth twice daily and swallow.  Do not eat or drink for 30 minutes after administration.  DO NOT INHALE INTO LUNGS. Yes Kiera De Jesus, APRN - CNP   atorvastatin (LIPITOR) 20 MG tablet TAKE 1 TABLET BY MOUTH ONE TIME A DAY Yes Gage Gibson, DO   omeprazole (PRILOSEC) 40 MG delayed release capsule Take 1 capsule by mouth every morning (before breakfast) Yes Adina Rock APRN - CNP   amLODIPine (NORVASC) 5 MG tablet TAKE 1 TABLET BY MOUTH EVERY DAY Yes Gage Gibson, DO   prednisoLONE acetate (PRED FORTE) 1 % ophthalmic suspension INSTILL 1 DROP INTO RIGHT EYE EVERY 2 WEEKS Yes Jessica Garcia MD COMBIGAN 0.2-0.5 % ophthalmic solution Place 1 drop into the right eye 2 times daily Yes Jessica Garcia MD   aspirin 81 MG tablet Take 1 tablet by mouth daily Yes Jessica Garcia MD   MULTIPLE VITAMIN PO Take by mouth daily  Yes Jessica Garcia MD

## 2024-08-07 ENCOUNTER — PROCEDURE VISIT (OUTPATIENT)
Dept: UROLOGY | Age: 64
End: 2024-08-07

## 2024-08-07 VITALS
WEIGHT: 260 LBS | DIASTOLIC BLOOD PRESSURE: 94 MMHG | BODY MASS INDEX: 33.37 KG/M2 | SYSTOLIC BLOOD PRESSURE: 138 MMHG | HEIGHT: 74 IN

## 2024-08-07 DIAGNOSIS — R97.20 ELEVATED PSA: Primary | ICD-10-CM

## 2024-08-07 RX ORDER — TOBRAMYCIN 40 MG/ML
80 INJECTION INTRAMUSCULAR; INTRAVENOUS ONCE
Status: COMPLETED | OUTPATIENT
Start: 2024-08-07 | End: 2024-08-07

## 2024-08-07 RX ADMIN — TOBRAMYCIN 80 MG: 40 INJECTION INTRAMUSCULAR; INTRAVENOUS at 13:29

## 2024-08-07 NOTE — PROGRESS NOTES
Dr. Meliton Rodriguez MD  Urologic Surgery      [place], [place]. [place]  08/07/24    Patient:  Vincent Lezama  MRN: [id number]  YOB: 1960    Surgeon: Dr. Meliton Rodriguez MD  Assistant: None    Pre-op Diagnosis: Elevated PSA.   Post-op Diagnosis: Elevated PSA.     Procedure:   Trans-Rectal Ultrasound Guided Biopsy of the Prostate, MRI fusion    Findings:        --Gland Size: 115g       --Prostate Lesions: None    Anesthesia: Local  Complications: None  OR Blood Loss: Minimal  Fluids: Cystalloids  Specimens: Prostate Cores      Narrative of the Procedure:    After informed consent was obtain, the patient was taken to the operative suite. He remained on the [facility]. He was rolled onto the side. The legs were brought toward the chest. Anesthesia was induced. Antibiotics were given. A timeout occurred in which two patient identifiers were used. The rectal ultrasound probe was inserted and volumetric measurements were taken. The prostate volume was 115 grams. The prostate was assessed in its entirety and no hypoechoic or otherwise abnormal lesions were noted. The bilateral neurovascular bundles were located and 1% lidocaine local anesthetic was injected bilaterally for local nerve blocks. Starting at the base of the gland and worked apically, sampling was completed. A standard sextant template was employed bilaterally. A total of 1 core was taken within each quadrant for a total of 6 biopsies.  We then proceeded with MRI fusion with rotational adjustments and elastic deformation.  The 3 areas of interest were noted.  2 biopsies were taken through each of these areas.  Once completed, the rectal ultrasound probe was removed. Inspection of the rectum demonstrated no active bleeding. The patient was rolled back into the supine position. He was then discharged back to the PACU in good and stable condition.     Follow-Up: The patient will be discharged home today. Once resulted, the pathology

## 2024-08-07 NOTE — PROGRESS NOTES
Procedure: Trus/Biopsy  Pt name and birth date verified Yes  Patient agrees Dr Rodriguez  is taking biopsies of the prostate Yes  Patient took Enema 2 hours prior to procedure Yes  Patient took 2 pill(s) of Cipro day before procedure, day of, and will the day after Yes  Has patient eaten today?  Yes  Patient stopped all blood thinners prior to surgery Yes    DIAGNOSIS/INDICATION: Elevated PSA     Patient has given me verbal consent to perform Tobramycin Injection  Yes    Following Dr Rodriguez  plan of care.  Tobramycin 80MG/2ML GIVEN I.M right UOQ HIP  Lot Number: [id number]  Expiration Date: 11/2026  NDC #: 99129-485-54    Medication supplied by office.

## 2024-08-21 ENCOUNTER — OFFICE VISIT (OUTPATIENT)
Dept: UROLOGY | Age: 64
End: 2024-08-21
Payer: MEDICARE

## 2024-08-21 VITALS — RESPIRATION RATE: 15 BRPM | WEIGHT: 260 LBS | HEIGHT: 74 IN | BODY MASS INDEX: 33.37 KG/M2

## 2024-08-21 DIAGNOSIS — N40.1 BENIGN PROSTATIC HYPERPLASIA WITH LOWER URINARY TRACT SYMPTOMS, SYMPTOM DETAILS UNSPECIFIED: ICD-10-CM

## 2024-08-21 DIAGNOSIS — R97.20 ELEVATED PSA: Primary | ICD-10-CM

## 2024-08-21 PROCEDURE — 99214 OFFICE O/P EST MOD 30 MIN: CPT | Performed by: UROLOGY

## 2024-08-21 PROCEDURE — 1123F ACP DISCUSS/DSCN MKR DOCD: CPT | Performed by: UROLOGY

## 2024-08-21 NOTE — PROGRESS NOTES
Dr. Meliton Rodriguez MD  [facility]  Urology Clinic      Patient:  Vincent Lezama  YOB: 1960  Date: 8/21/2024    HISTORY OF PRESENT ILLNESS:   The patient is a 63 y.o. male who presents today for follow-up for the following problem(s): BPH with outlet obstruction and elevated PSA. Negative biopsy in 2020 and PVP in late 2020 after bioopsy. Having mild UUI but this is better. He had some hematuria after PVP. Cysto showed mild regrwoth at apex and finasteride was started.     Overall the problem(s) : show no change.  Associated Symptoms: No dysuria, gross hematuria.  Pain Severity: 0/10    Summary of old records:   Biopsy 2020 and 2016 - both egative  PVP late 2020.   Feb 2021 finasteride started. PSA was ~8.5 at the time.     Imaging/Labs reviewed during today's visit:  I have independently reviewed and verified the following films during today's visit.  None    Last several PSA's:  Lab Results   Component Value Date    PSA 7.33 (H) 05/03/2024    PSA 5.19 (H) 04/27/2023    PSA 4.18 (H) 05/02/2022       Last total testosterone:  No results found for: \"TESTOSTERONE\"    Urinalysis today:  No results found for this visit on 08/21/24.    Last BUN and creatinine:  Lab Results   Component Value Date    BUN 12 07/16/2024     Lab Results   Component Value Date    CREATININE 1.2 07/16/2024       Imaging Reviewed during this Office Visit:   (results were independently reviewed by physician and radiology report verified)    PAST MEDICAL, FAMILY AND SOCIAL HISTORY UPDATE:  Past Medical History:   Diagnosis Date    Acute kidney injury (HCC) 09/25/2020    Acute prostatitis     Elevated PSA     Hyperlipidemia LDL goal < 100 02/26/2013    Hypertension     Obesity (BMI 30-39.9)      Past Surgical History:   Procedure Laterality Date    ACHILLES TENDON SURGERY      COLONOSCOPY      2023-sean    CORNEAL TRANSPLANT  07/2013    CYSTOSCOPY N/A 10/28/2020    CYSTOSCOPY,GREENLIGHT PHOTOVAPORIZATION OF PROSTATE performed

## 2024-09-13 RX ORDER — AMLODIPINE BESYLATE 5 MG/1
TABLET ORAL
Qty: 90 TABLET | Refills: 3 | Status: SHIPPED | OUTPATIENT
Start: 2024-09-13

## 2024-12-19 RX ORDER — ATORVASTATIN CALCIUM 20 MG/1
TABLET, FILM COATED ORAL
Qty: 90 TABLET | Refills: 3 | Status: SHIPPED | OUTPATIENT
Start: 2024-12-19

## 2025-05-23 ENCOUNTER — HOSPITAL ENCOUNTER (EMERGENCY)
Age: 65
Discharge: HOME | End: 2025-05-23
Payer: MEDICARE

## 2025-05-23 VITALS
OXYGEN SATURATION: 97 % | RESPIRATION RATE: 16 BRPM | BODY MASS INDEX: 34.9 KG/M2 | HEART RATE: 94 BPM | TEMPERATURE: 98.7 F | DIASTOLIC BLOOD PRESSURE: 84 MMHG | WEIGHT: 272 LBS | SYSTOLIC BLOOD PRESSURE: 130 MMHG

## 2025-05-23 DIAGNOSIS — J06.9 UPPER RESPIRATORY TRACT INFECTION, UNSPECIFIED TYPE: Primary | ICD-10-CM

## 2025-05-23 LAB — S PYO AG THROAT QL: NEGATIVE

## 2025-05-23 PROCEDURE — 87651 STREP A DNA AMP PROBE: CPT

## 2025-05-23 PROCEDURE — 99213 OFFICE O/P EST LOW 20 MIN: CPT

## 2025-05-23 RX ORDER — AZITHROMYCIN 250 MG/1
TABLET, FILM COATED ORAL
Qty: 6 TABLET | Refills: 0 | Status: SHIPPED | OUTPATIENT
Start: 2025-05-23 | End: 2025-06-02

## 2025-05-23 ASSESSMENT — ENCOUNTER SYMPTOMS
DIARRHEA: 0
NAUSEA: 0
COUGH: 1
SORE THROAT: 1
EYES NEGATIVE: 1
GASTROINTESTINAL NEGATIVE: 1
ALLERGIC/IMMUNOLOGIC NEGATIVE: 1
VOMITING: 0

## 2025-05-23 ASSESSMENT — PAIN - FUNCTIONAL ASSESSMENT
PAIN_FUNCTIONAL_ASSESSMENT: 0-10
PAIN_FUNCTIONAL_ASSESSMENT: ACTIVITIES ARE NOT PREVENTED

## 2025-05-23 ASSESSMENT — PAIN DESCRIPTION - FREQUENCY: FREQUENCY: INTERMITTENT

## 2025-05-23 ASSESSMENT — PAIN SCALES - GENERAL: PAINLEVEL_OUTOF10: 1

## 2025-05-23 ASSESSMENT — PAIN DESCRIPTION - LOCATION: LOCATION: THROAT

## 2025-05-23 NOTE — ED TRIAGE NOTES
Vincent arrives to room with complaint of  sore throat, bilateral ear pain, eyes matted shut in am waking, cough  symptoms started 2 weeks ago    Taking tylenol, nyquil

## 2025-05-23 NOTE — ED PROVIDER NOTES
[facility] URGENT CARE  Urgent Care Encounter       CHIEF COMPLAINT       Chief Complaint   Patient presents with    Pharyngitis    Ear Pain       Nurses Notes reviewed and I agree except as noted in the HPI.  HISTORY OF PRESENT ILLNESS   Vincent Lezama is a 64 y.o. male who presents to urgent care for sore throat, cough, congestion and bilateral ear pain. Symptoms started a week and a half ago.  Has tried over-the-counter throat lozenges with little relief.  Denies fever, nausea, vomiting diarrhea.  Denies sick contacts.    The history is provided by the patient. No  was used.       REVIEW OF SYSTEMS     Review of Systems   Constitutional:  Negative for fever.   HENT:  Positive for congestion, ear pain (bilateral) and sore throat.    Eyes: Negative.    Respiratory:  Positive for cough.    Cardiovascular: Negative.    Gastrointestinal: Negative.  Negative for diarrhea, nausea and vomiting.   Endocrine: Negative.    Genitourinary: Negative.    Musculoskeletal: Negative.    Allergic/Immunologic: Negative.    Neurological: Negative.    Hematological: Negative.    Psychiatric/Behavioral: Negative.         PAST MEDICAL HISTORY         Diagnosis Date    Acute kidney injury 09/25/2020    Acute prostatitis     Elevated PSA     Hyperlipidemia LDL goal < 100 02/26/2013    Hypertension     Obesity (BMI 30-39.9)        SURGICALHISTORY     Patient  has a past surgical history that includes Achilles tendon surgery; Corneal transplant (07/2013); Prostate surgery (02/04/2014); Eye surgery (2014); pr egd flexible foreign body removal (Left, 01/18/2018); Ultrasound Prostate/Transrectal (N/A, 10/14/2020); Cystoscopy (N/A, 10/28/2020); Colonoscopy; Upper gastrointestinal endoscopy (N/A, 9/25/2023); and Upper gastrointestinal endoscopy (Left, 9/25/2023).    CURRENT MEDICATIONS       Previous Medications    AMLODIPINE (NORVASC) 5 MG TABLET    TAKE 1 TABLET BY MOUTH EVERY DAY    ASPIRIN 81 MG TABLET    Take 1  that he has never smoked. He has never used smokeless tobacco. He reports current alcohol use of about 2.0 standard drinks of alcohol per week. He reports that he does not use drugs.    PHYSICAL EXAM     ED TRIAGE VITALS  BP: 130/84, Temp: 98.7 °F (37.1 °C), Pulse: 94, Respirations: 16, SpO2: 97 %,Estimated body mass index is 34.9 kg/m² as calculated from the following:    Height as of 10/14/24: 1.88 m (6' 2.02\").    Weight as of this encounter: 123.4 kg (272 lb).,No LMP for male patient.    Physical Exam  Vitals and nursing note reviewed.   Constitutional:       General: He is not in acute distress.     Appearance: He is well-developed and normal weight. He is not ill-appearing, toxic-appearing or diaphoretic.   HENT:      Head: Normocephalic and atraumatic.      Right Ear: Tympanic membrane and ear canal normal.      Left Ear: Tympanic membrane and ear canal normal.      Nose: Congestion present.      Mouth/Throat:      Mouth: Mucous membranes are moist.      Pharynx: Uvula midline. Posterior oropharyngeal erythema present. No oropharyngeal exudate.   Eyes:      Conjunctiva/sclera: Conjunctivae normal.   Cardiovascular:      Rate and Rhythm: Normal rate and regular rhythm.      Heart sounds: Normal heart sounds.   Pulmonary:      Effort: Pulmonary effort is normal. No respiratory distress.      Breath sounds: Normal breath sounds. No stridor. No wheezing, rhonchi or rales.   Chest:      Chest wall: No tenderness.   Lymphadenopathy:      Cervical: Cervical adenopathy present.   Skin:     General: Skin is warm and dry.      Capillary Refill: Capillary refill takes less than 2 seconds.   Neurological:      General: No focal deficit present.      Mental Status: He is alert and oriented to person, place, and time.   Psychiatric:         Mood and Affect: Mood normal.         Behavior: Behavior normal.         DIAGNOSTIC RESULTS     Labs:  Results for orders placed or performed during the hospital encounter of 05/23/25

## 2025-05-23 NOTE — DISCHARGE INSTRUCTIONS
Medications as prescribed.  Increase fluid intake.  Can do warm salt water gargles, Chloraseptic spray include Cepacol lozenges for sore throat.  Continue over-the-counter Zyrtec and Flonase for congestion.  Can take over-the-counter Motrin or Tylenol as needed for pain and fever.  Follow-up with family doctor in 3 to 5 days.  Go to the emergency room for any difficulty breathing new or worsening symptoms.

## 2025-07-18 ENCOUNTER — TELEPHONE (OUTPATIENT)
Dept: FAMILY MEDICINE CLINIC | Age: 65
End: 2025-07-18

## 2025-07-18 DIAGNOSIS — Z12.5 SCREENING FOR PROSTATE CANCER: ICD-10-CM

## 2025-07-18 DIAGNOSIS — D64.9 NORMOCYTIC ANEMIA: ICD-10-CM

## 2025-07-18 DIAGNOSIS — I10 ESSENTIAL HYPERTENSION: Primary | ICD-10-CM

## 2025-07-18 DIAGNOSIS — R73.01 IFG (IMPAIRED FASTING GLUCOSE): ICD-10-CM

## 2025-07-18 DIAGNOSIS — E78.5 HYPERLIPIDEMIA WITH TARGET LDL LESS THAN 100: ICD-10-CM

## 2025-07-18 SDOH — HEALTH STABILITY: PHYSICAL HEALTH: ON AVERAGE, HOW MANY MINUTES DO YOU ENGAGE IN EXERCISE AT THIS LEVEL?: 60 MIN

## 2025-07-18 SDOH — HEALTH STABILITY: PHYSICAL HEALTH: ON AVERAGE, HOW MANY DAYS PER WEEK DO YOU ENGAGE IN MODERATE TO STRENUOUS EXERCISE (LIKE A BRISK WALK)?: 6 DAYS

## 2025-07-18 ASSESSMENT — PATIENT HEALTH QUESTIONNAIRE - PHQ9
2. FEELING DOWN, DEPRESSED OR HOPELESS: SEVERAL DAYS
SUM OF ALL RESPONSES TO PHQ QUESTIONS 1-9: 2
1. LITTLE INTEREST OR PLEASURE IN DOING THINGS: SEVERAL DAYS
SUM OF ALL RESPONSES TO PHQ QUESTIONS 1-9: 2

## 2025-07-18 ASSESSMENT — LIFESTYLE VARIABLES
HOW OFTEN DO YOU HAVE A DRINK CONTAINING ALCOHOL: 98
HOW MANY STANDARD DRINKS CONTAINING ALCOHOL DO YOU HAVE ON A TYPICAL DAY: 1
HOW OFTEN DO YOU HAVE SIX OR MORE DRINKS ON ONE OCCASION: 1
HOW MANY STANDARD DRINKS CONTAINING ALCOHOL DO YOU HAVE ON A TYPICAL DAY: 1 OR 2
HOW OFTEN DO YOU HAVE A DRINK CONTAINING ALCOHOL: PATIENT DECLINED

## 2025-07-18 NOTE — TELEPHONE ENCOUNTER
Patient called into the office. Stated he was at [facility] for his annual labs that he has completed prior to his appt. No labs are ordered currently. I updated patient that I would request labs, however with provider being out of office, I was not sure when they would be placed. He stated \"It just doesn't make sense, I always come here before my appointment\". I apologized and updated that the request was placed. Please advise.

## 2025-07-20 SDOH — ECONOMIC STABILITY: FOOD INSECURITY: WITHIN THE PAST 12 MONTHS, THE FOOD YOU BOUGHT JUST DIDN'T LAST AND YOU DIDN'T HAVE MONEY TO GET MORE.: NEVER TRUE

## 2025-07-20 SDOH — ECONOMIC STABILITY: FOOD INSECURITY: WITHIN THE PAST 12 MONTHS, YOU WORRIED THAT YOUR FOOD WOULD RUN OUT BEFORE YOU GOT MONEY TO BUY MORE.: NEVER TRUE

## 2025-07-20 SDOH — ECONOMIC STABILITY: INCOME INSECURITY: IN THE LAST 12 MONTHS, WAS THERE A TIME WHEN YOU WERE NOT ABLE TO PAY THE MORTGAGE OR RENT ON TIME?: NO

## 2025-07-20 SDOH — ECONOMIC STABILITY: TRANSPORTATION INSECURITY
IN THE PAST 12 MONTHS, HAS THE LACK OF TRANSPORTATION KEPT YOU FROM MEDICAL APPOINTMENTS OR FROM GETTING MEDICATIONS?: NO

## 2025-07-21 ENCOUNTER — LAB (OUTPATIENT)
Dept: LAB | Age: 65
End: 2025-07-21

## 2025-07-21 DIAGNOSIS — D64.9 NORMOCYTIC ANEMIA: ICD-10-CM

## 2025-07-21 DIAGNOSIS — I10 ESSENTIAL HYPERTENSION: ICD-10-CM

## 2025-07-21 DIAGNOSIS — E78.5 HYPERLIPIDEMIA WITH TARGET LDL LESS THAN 100: ICD-10-CM

## 2025-07-21 DIAGNOSIS — R73.01 IFG (IMPAIRED FASTING GLUCOSE): ICD-10-CM

## 2025-07-21 DIAGNOSIS — Z12.5 SCREENING FOR PROSTATE CANCER: ICD-10-CM

## 2025-07-21 LAB
ALBUMIN SERPL BCG-MCNC: 4.1 G/DL (ref 3.4–4.9)
ALP SERPL-CCNC: 79 U/L (ref 40–129)
ALT SERPL W/O P-5'-P-CCNC: 24 U/L (ref 10–50)
ANION GAP SERPL CALC-SCNC: 11 MEQ/L (ref 8–16)
AST SERPL-CCNC: 27 U/L (ref 10–50)
BASOPHILS ABSOLUTE: 0 THOU/MM3 (ref 0–0.1)
BASOPHILS NFR BLD AUTO: 0.7 %
BILIRUB SERPL-MCNC: 0.5 MG/DL (ref 0.3–1.2)
BUN SERPL-MCNC: 12 MG/DL (ref 8–23)
CALCIUM SERPL-MCNC: 9.5 MG/DL (ref 8.8–10.2)
CHLORIDE SERPL-SCNC: 105 MEQ/L (ref 98–111)
CHOLEST SERPL-MCNC: 125 MG/DL (ref 100–199)
CO2 SERPL-SCNC: 24 MEQ/L (ref 22–29)
CREAT SERPL-MCNC: 1.2 MG/DL (ref 0.7–1.2)
DEPRECATED MEAN GLUCOSE BLD GHB EST-ACNC: 123 MG/DL (ref 70–126)
DEPRECATED RDW RBC AUTO: 46.1 FL (ref 35–45)
EOSINOPHIL NFR BLD AUTO: 2 %
EOSINOPHILS ABSOLUTE: 0.1 THOU/MM3 (ref 0–0.4)
ERYTHROCYTE [DISTWIDTH] IN BLOOD BY AUTOMATED COUNT: 13.3 % (ref 11.5–14.5)
GFR SERPL CREATININE-BSD FRML MDRD: 66 ML/MIN/1.73M2
GLUCOSE SERPL-MCNC: 96 MG/DL (ref 74–109)
HBA1C MFR BLD HPLC: 6.1 % (ref 4–6)
HCT VFR BLD AUTO: 44.8 % (ref 42–52)
HDLC SERPL-MCNC: 47 MG/DL
HGB BLD-MCNC: 14.7 GM/DL (ref 14–18)
IMM GRANULOCYTES # BLD AUTO: 0 THOU/MM3 (ref 0–0.07)
IMM GRANULOCYTES NFR BLD AUTO: 0 %
LDLC SERPL CALC-MCNC: 64 MG/DL
LYMPHOCYTES ABSOLUTE: 1.4 THOU/MM3 (ref 1–4.8)
LYMPHOCYTES NFR BLD AUTO: 31.1 %
MCH RBC QN AUTO: 30.9 PG (ref 26–33)
MCHC RBC AUTO-ENTMCNC: 32.8 GM/DL (ref 32.2–35.5)
MCV RBC AUTO: 94.3 FL (ref 80–94)
MONOCYTES ABSOLUTE: 0.3 THOU/MM3 (ref 0.4–1.3)
MONOCYTES NFR BLD AUTO: 7 %
NEUTROPHILS ABSOLUTE: 2.6 THOU/MM3 (ref 1.8–7.7)
NEUTROPHILS NFR BLD AUTO: 59.2 %
NRBC BLD AUTO-RTO: 0 /100 WBC
PLATELET # BLD AUTO: 227 THOU/MM3 (ref 130–400)
PMV BLD AUTO: 9.6 FL (ref 9.4–12.4)
POTASSIUM SERPL-SCNC: 4.1 MEQ/L (ref 3.5–5.2)
PROT SERPL-MCNC: 6.8 G/DL (ref 6.4–8.3)
PSA SERPL-MCNC: 10.5 NG/ML (ref 0–1)
RBC # BLD AUTO: 4.75 MILL/MM3 (ref 4.7–6.1)
SODIUM SERPL-SCNC: 140 MEQ/L (ref 135–145)
TRIGL SERPL-MCNC: 69 MG/DL (ref 0–199)
TSH SERPL DL<=0.05 MIU/L-ACNC: 0.68 UIU/ML (ref 0.27–4.2)
WBC # BLD AUTO: 4.4 THOU/MM3 (ref 4.8–10.8)

## 2025-07-23 ENCOUNTER — OFFICE VISIT (OUTPATIENT)
Dept: FAMILY MEDICINE CLINIC | Age: 65
End: 2025-07-23
Payer: MEDICARE

## 2025-07-23 VITALS
WEIGHT: 252.4 LBS | HEART RATE: 102 BPM | DIASTOLIC BLOOD PRESSURE: 90 MMHG | TEMPERATURE: 97.5 F | BODY MASS INDEX: 32.39 KG/M2 | RESPIRATION RATE: 16 BRPM | SYSTOLIC BLOOD PRESSURE: 148 MMHG | OXYGEN SATURATION: 99 % | HEIGHT: 74 IN

## 2025-07-23 DIAGNOSIS — E78.5 HYPERLIPIDEMIA WITH TARGET LDL LESS THAN 100: ICD-10-CM

## 2025-07-23 DIAGNOSIS — R73.01 IFG (IMPAIRED FASTING GLUCOSE): ICD-10-CM

## 2025-07-23 DIAGNOSIS — E66.9 OBESITY (BMI 30-39.9): ICD-10-CM

## 2025-07-23 DIAGNOSIS — N40.1 BENIGN PROSTATIC HYPERPLASIA WITH URINARY OBSTRUCTION: ICD-10-CM

## 2025-07-23 DIAGNOSIS — N13.8 BENIGN PROSTATIC HYPERPLASIA WITH URINARY OBSTRUCTION: ICD-10-CM

## 2025-07-23 DIAGNOSIS — Z00.00 WELCOME TO MEDICARE PREVENTIVE VISIT: Primary | ICD-10-CM

## 2025-07-23 DIAGNOSIS — I10 ESSENTIAL HYPERTENSION: ICD-10-CM

## 2025-07-23 PROCEDURE — 3079F DIAST BP 80-89 MM HG: CPT | Performed by: FAMILY MEDICINE

## 2025-07-23 PROCEDURE — 1036F TOBACCO NON-USER: CPT | Performed by: FAMILY MEDICINE

## 2025-07-23 PROCEDURE — 1123F ACP DISCUSS/DSCN MKR DOCD: CPT | Performed by: FAMILY MEDICINE

## 2025-07-23 PROCEDURE — 99214 OFFICE O/P EST MOD 30 MIN: CPT | Performed by: FAMILY MEDICINE

## 2025-07-23 PROCEDURE — G8427 DOCREV CUR MEDS BY ELIG CLIN: HCPCS | Performed by: FAMILY MEDICINE

## 2025-07-23 PROCEDURE — G0402 INITIAL PREVENTIVE EXAM: HCPCS | Performed by: FAMILY MEDICINE

## 2025-07-23 PROCEDURE — G8417 CALC BMI ABV UP PARAM F/U: HCPCS | Performed by: FAMILY MEDICINE

## 2025-07-23 PROCEDURE — 3017F COLORECTAL CA SCREEN DOC REV: CPT | Performed by: FAMILY MEDICINE

## 2025-07-23 PROCEDURE — 3077F SYST BP >= 140 MM HG: CPT | Performed by: FAMILY MEDICINE

## 2025-07-23 PROCEDURE — 1159F MED LIST DOCD IN RCRD: CPT | Performed by: FAMILY MEDICINE

## 2025-07-23 ASSESSMENT — ENCOUNTER SYMPTOMS
RESPIRATORY NEGATIVE: 1
GASTROINTESTINAL NEGATIVE: 1

## 2025-07-23 NOTE — PROGRESS NOTES
Risk Factor Screenings with Interventions:                Abnormal BMI (obese):  Body mass index is 32.41 kg/m². (!) Abnormal  Interventions:  See AVS for additional education material           Safety:  Do you have non-slip mats or non-slip surfaces or shower bars or grab bars in your shower or bathtub?: (!) No  Interventions:  See AVS for additional education material      Advanced Directives:  Do you have a Living Will?: (!) No    Intervention:  has NO advanced directive - not interested in additional information                     Objective   Vitals:    07/23/25 1358 07/23/25 1400   BP: (!) 158/88 (!) 148/90   BP Site: Right Upper Arm Right Upper Arm   Patient Position: Sitting Sitting   Pulse: (!) 102    Resp: 16    Temp: 97.5 °F (36.4 °C)    TempSrc: Temporal    SpO2: 99%    Weight: 114.5 kg (252 lb 6.4 oz)    Height: 1.88 m (6' 2\")       Body mass index is 32.41 kg/m².                  Allergies   Allergen Reactions    Shellfish-Derived Products      Chest tightens and throat swells    Lisinopril Swelling    Nsaids      Due to acute kidney injury 9/20     Prior to Visit Medications    Medication Sig Taking? Authorizing Provider   atorvastatin (LIPITOR) 20 MG tablet TAKE 1 TABLET BY MOUTH EVERY DAY Yes Gage Gibson, DO   fluticasone (FLOVENT HFA) 220 MCG/ACT inhaler Instill 1 puff onto tongue twice daily - do not inhale into lungs.  Do not eat or drink for 30 minutes post administration.  Dx: eosinophilic esophagitis Yes Kiera De Jesus APRN - CNP   omeprazole (PRILOSEC) 40 MG delayed release capsule Take 1 capsule by mouth every morning (before breakfast) Yes Kiera De Jesus APRN - CNP   amLODIPine (NORVASC) 5 MG tablet TAKE 1 TABLET BY MOUTH EVERY DAY Yes Gage Gibson, DO   prednisoLONE acetate (PRED FORTE) 1 % ophthalmic suspension INSTILL 1 DROP INTO RIGHT EYE EVERY 2 WEEKS Yes Provider, MD NICK Lopez 0.2-0.5 % ophthalmic solution Place 1 drop into the right eye 2 times daily

## 2025-09-02 ENCOUNTER — LAB (OUTPATIENT)
Dept: LAB | Age: 65
End: 2025-09-02

## 2025-09-02 DIAGNOSIS — R97.20 ELEVATED PSA: ICD-10-CM

## 2025-09-02 LAB — PSA SERPL-MCNC: 12.8 NG/ML (ref 0–1)

## 2025-09-03 ENCOUNTER — OFFICE VISIT (OUTPATIENT)
Dept: UROLOGY | Age: 65
End: 2025-09-03
Payer: MEDICARE

## 2025-09-03 VITALS — BODY MASS INDEX: 30.8 KG/M2 | RESPIRATION RATE: 18 BRPM | WEIGHT: 240 LBS | HEIGHT: 74 IN

## 2025-09-03 DIAGNOSIS — N40.1 BENIGN PROSTATIC HYPERPLASIA WITH LOWER URINARY TRACT SYMPTOMS, SYMPTOM DETAILS UNSPECIFIED: ICD-10-CM

## 2025-09-03 DIAGNOSIS — N41.1 CHRONIC PROSTATITIS: ICD-10-CM

## 2025-09-03 DIAGNOSIS — R97.20 ELEVATED PSA: Primary | ICD-10-CM

## 2025-09-03 PROCEDURE — G8427 DOCREV CUR MEDS BY ELIG CLIN: HCPCS | Performed by: UROLOGY

## 2025-09-03 PROCEDURE — 3017F COLORECTAL CA SCREEN DOC REV: CPT | Performed by: UROLOGY

## 2025-09-03 PROCEDURE — 1123F ACP DISCUSS/DSCN MKR DOCD: CPT | Performed by: UROLOGY

## 2025-09-03 PROCEDURE — 1036F TOBACCO NON-USER: CPT | Performed by: UROLOGY

## 2025-09-03 PROCEDURE — 1159F MED LIST DOCD IN RCRD: CPT | Performed by: UROLOGY

## 2025-09-03 PROCEDURE — G8417 CALC BMI ABV UP PARAM F/U: HCPCS | Performed by: UROLOGY

## 2025-09-03 PROCEDURE — 99214 OFFICE O/P EST MOD 30 MIN: CPT | Performed by: UROLOGY

## (undated) DEVICE — MAX-CORE® DISPOSABLE CORE BIOPSY INSTRUMENT, 18G X 25CM: Brand: MAX-CORE

## (undated) DEVICE — SOLUTION IV 1000ML 0.45% SOD CHL PH 5 INJ USP VIAFLX PLAS

## (undated) DEVICE — BAG DRNGE (SEE COMMENT) UROLOGY TBL 15.5X31 IN W/HOSE

## (undated) DEVICE — IV START KIT: Brand: MEDLINE INDUSTRIES, INC.

## (undated) DEVICE — CATHETER DIL 6FR L180CM BLLN INFLATED 36-40.5-45FR L8CM ES

## (undated) DEVICE — LASER SURG W22XH58IN D36IN 475LB SLD STATE FREQ DOUBLED

## (undated) DEVICE — SET ADMIN 25ML L117IN PMP MOD CK VLV RLER CLMP 2 SMRTSITE

## (undated) DEVICE — TUBING, SUCTION, 1/4" X 20', STRAIGHT: Brand: MEDLINE INDUSTRIES, INC.

## (undated) DEVICE — DRAINBAG,ANTI-REFLUX TOWER,L/F,2000ML,LL: Brand: MEDLINE

## (undated) DEVICE — GLOVE ORANGE PI 7 1/2   MSG9075

## (undated) DEVICE — SOLUTION SCRB 4OZ 4% CHG H2O AIDED FOR PREOPERATIVE SKIN

## (undated) DEVICE — SNARE POLYP SM W13MMXL240CM SHTH DIA2.4MM OVL FLX DISP

## (undated) DEVICE — TOWEL,OR,DSP,ST,BLUE,STD,4/PK,20PK/CS: Brand: MEDLINE

## (undated) DEVICE — CANISTER, RIGID, 2000CC: Brand: MEDLINE INDUSTRIES, INC.

## (undated) DEVICE — SPONGE GZ W4XL4IN COT 12 PLY TYP VII WVN C FLD DSGN

## (undated) DEVICE — CATHETER ETER IV 22GA L1IN POLYUR STR RADPQ INTROCAN SFTY

## (undated) DEVICE — GLOVE ORANGE PI 7   MSG9070